# Patient Record
Sex: FEMALE | Race: WHITE | NOT HISPANIC OR LATINO | Employment: PART TIME | URBAN - METROPOLITAN AREA
[De-identification: names, ages, dates, MRNs, and addresses within clinical notes are randomized per-mention and may not be internally consistent; named-entity substitution may affect disease eponyms.]

---

## 2017-01-18 ENCOUNTER — OFFICE VISIT - HEALTHEAST (OUTPATIENT)
Dept: FAMILY MEDICINE | Facility: CLINIC | Age: 57
End: 2017-01-18

## 2017-01-18 DIAGNOSIS — J02.9 SORE THROAT: ICD-10-CM

## 2017-01-18 DIAGNOSIS — J06.9 URI (UPPER RESPIRATORY INFECTION): ICD-10-CM

## 2017-04-29 ENCOUNTER — OFFICE VISIT - HEALTHEAST (OUTPATIENT)
Dept: FAMILY MEDICINE | Facility: CLINIC | Age: 57
End: 2017-04-29

## 2017-04-29 DIAGNOSIS — J32.9 SINUSITIS: ICD-10-CM

## 2017-04-29 RX ORDER — MEDROXYPROGESTERONE ACETATE 2.5 MG/1
TABLET ORAL
Refills: 0 | Status: SHIPPED | COMMUNITY
Start: 2017-04-06

## 2017-10-12 ENCOUNTER — RECORDS - HEALTHEAST (OUTPATIENT)
Dept: ADMINISTRATIVE | Facility: OTHER | Age: 57
End: 2017-10-12

## 2017-12-19 ENCOUNTER — OFFICE VISIT - HEALTHEAST (OUTPATIENT)
Dept: FAMILY MEDICINE | Facility: CLINIC | Age: 57
End: 2017-12-19

## 2017-12-19 ENCOUNTER — COMMUNICATION - HEALTHEAST (OUTPATIENT)
Dept: FAMILY MEDICINE | Facility: CLINIC | Age: 57
End: 2017-12-19

## 2017-12-19 DIAGNOSIS — E55.9 VITAMIN D DEFICIENCY: ICD-10-CM

## 2017-12-19 DIAGNOSIS — F33.9 RECURRENT MAJOR DEPRESSIVE DISORDER, REMISSION STATUS UNSPECIFIED (H): ICD-10-CM

## 2017-12-19 DIAGNOSIS — E78.5 HYPERLIPIDEMIA: ICD-10-CM

## 2017-12-19 DIAGNOSIS — Z12.31 VISIT FOR SCREENING MAMMOGRAM: ICD-10-CM

## 2017-12-19 DIAGNOSIS — Z83.79 FAMILY HISTORY OF CELIAC DISEASE: ICD-10-CM

## 2017-12-19 DIAGNOSIS — R03.0 ELEVATED BLOOD PRESSURE, SITUATIONAL: ICD-10-CM

## 2017-12-19 LAB
ATRIAL RATE - MUSE: 63 BPM
DIASTOLIC BLOOD PRESSURE - MUSE: NORMAL MMHG
INTERPRETATION ECG - MUSE: NORMAL
P AXIS - MUSE: 47 DEGREES
PR INTERVAL - MUSE: 172 MS
QRS DURATION - MUSE: 94 MS
QT - MUSE: 418 MS
QTC - MUSE: 427 MS
R AXIS - MUSE: 32 DEGREES
SYSTOLIC BLOOD PRESSURE - MUSE: NORMAL MMHG
T AXIS - MUSE: 54 DEGREES
VENTRICULAR RATE- MUSE: 63 BPM

## 2017-12-19 ASSESSMENT — MIFFLIN-ST. JEOR: SCORE: 1394.38

## 2017-12-21 LAB
GLIADIN IGA SER-ACNC: 1.3 U/ML
GLIADIN IGG SER-ACNC: <0.4 U/ML
IGA SERPL-MCNC: 299 MG/DL (ref 65–400)
TTG IGA SER-ACNC: 0.4 U/ML
TTG IGG SER-ACNC: <0.6 U/ML

## 2018-02-10 ENCOUNTER — COMMUNICATION - HEALTHEAST (OUTPATIENT)
Dept: FAMILY MEDICINE | Facility: CLINIC | Age: 58
End: 2018-02-10

## 2018-02-10 DIAGNOSIS — E55.9 VITAMIN D DEFICIENCY: ICD-10-CM

## 2019-07-29 ENCOUNTER — OFFICE VISIT - HEALTHEAST (OUTPATIENT)
Dept: FAMILY MEDICINE | Facility: CLINIC | Age: 59
End: 2019-07-29

## 2019-07-29 DIAGNOSIS — L30.9 DERMATITIS: ICD-10-CM

## 2019-10-21 ENCOUNTER — OFFICE VISIT - HEALTHEAST (OUTPATIENT)
Dept: FAMILY MEDICINE | Facility: CLINIC | Age: 59
End: 2019-10-21

## 2019-10-21 ENCOUNTER — RECORDS - HEALTHEAST (OUTPATIENT)
Dept: GENERAL RADIOLOGY | Facility: CLINIC | Age: 59
End: 2019-10-21

## 2019-10-21 DIAGNOSIS — J01.00 ACUTE MAXILLARY SINUSITIS, RECURRENCE NOT SPECIFIED: ICD-10-CM

## 2019-10-21 DIAGNOSIS — R05.9 COUGH: ICD-10-CM

## 2019-10-21 LAB
FLUAV AG SPEC QL IA: NORMAL
FLUBV AG SPEC QL IA: NORMAL

## 2019-10-21 ASSESSMENT — MIFFLIN-ST. JEOR: SCORE: 1409.35

## 2019-11-08 ENCOUNTER — COMMUNICATION - HEALTHEAST (OUTPATIENT)
Dept: FAMILY MEDICINE | Facility: CLINIC | Age: 59
End: 2019-11-08

## 2019-11-08 DIAGNOSIS — R05.9 COUGH: ICD-10-CM

## 2020-02-08 ENCOUNTER — OFFICE VISIT - HEALTHEAST (OUTPATIENT)
Dept: FAMILY MEDICINE | Facility: CLINIC | Age: 60
End: 2020-02-08

## 2020-02-08 DIAGNOSIS — J11.1 INFLUENZA-LIKE ILLNESS: ICD-10-CM

## 2020-02-08 DIAGNOSIS — R68.89 FLU-LIKE SYMPTOMS: ICD-10-CM

## 2020-02-08 LAB
FLUAV AG SPEC QL IA: NORMAL
FLUBV AG SPEC QL IA: NORMAL

## 2020-02-28 ENCOUNTER — OFFICE VISIT - HEALTHEAST (OUTPATIENT)
Dept: FAMILY MEDICINE | Facility: CLINIC | Age: 60
End: 2020-02-28

## 2020-02-28 DIAGNOSIS — J01.00 ACUTE MAXILLARY SINUSITIS, RECURRENCE NOT SPECIFIED: ICD-10-CM

## 2020-02-28 DIAGNOSIS — R51.9 NONINTRACTABLE HEADACHE, UNSPECIFIED CHRONICITY PATTERN, UNSPECIFIED HEADACHE TYPE: ICD-10-CM

## 2020-02-28 ASSESSMENT — MIFFLIN-ST. JEOR: SCORE: 1388.49

## 2020-07-06 ENCOUNTER — COMMUNICATION - HEALTHEAST (OUTPATIENT)
Dept: SCHEDULING | Facility: CLINIC | Age: 60
End: 2020-07-06

## 2020-07-06 DIAGNOSIS — Z11.59 SCREENING FOR VIRAL DISEASE: ICD-10-CM

## 2020-07-08 ENCOUNTER — COMMUNICATION - HEALTHEAST (OUTPATIENT)
Dept: FAMILY MEDICINE | Facility: CLINIC | Age: 60
End: 2020-07-08

## 2020-07-08 ENCOUNTER — AMBULATORY - HEALTHEAST (OUTPATIENT)
Dept: LAB | Facility: CLINIC | Age: 60
End: 2020-07-08

## 2020-07-08 DIAGNOSIS — Z11.59 SCREENING FOR VIRAL DISEASE: ICD-10-CM

## 2020-07-13 ENCOUNTER — COMMUNICATION - HEALTHEAST (OUTPATIENT)
Dept: FAMILY MEDICINE | Facility: CLINIC | Age: 60
End: 2020-07-13

## 2020-08-03 ENCOUNTER — COMMUNICATION - HEALTHEAST (OUTPATIENT)
Dept: FAMILY MEDICINE | Facility: CLINIC | Age: 60
End: 2020-08-03

## 2020-08-03 DIAGNOSIS — K44.9 HIATAL HERNIA: ICD-10-CM

## 2020-08-03 DIAGNOSIS — K21.9 GASTROESOPHAGEAL REFLUX DISEASE, ESOPHAGITIS PRESENCE NOT SPECIFIED: ICD-10-CM

## 2020-08-17 ENCOUNTER — RECORDS - HEALTHEAST (OUTPATIENT)
Dept: ADMINISTRATIVE | Facility: OTHER | Age: 60
End: 2020-08-17

## 2020-09-30 ENCOUNTER — AMBULATORY - HEALTHEAST (OUTPATIENT)
Dept: NURSING | Facility: CLINIC | Age: 60
End: 2020-09-30

## 2020-09-30 ENCOUNTER — AMBULATORY - HEALTHEAST (OUTPATIENT)
Dept: FAMILY MEDICINE | Facility: CLINIC | Age: 60
End: 2020-09-30

## 2020-09-30 DIAGNOSIS — Z23 NEED FOR SHINGLES VACCINE: ICD-10-CM

## 2020-10-01 ENCOUNTER — COMMUNICATION - HEALTHEAST (OUTPATIENT)
Dept: SCHEDULING | Facility: CLINIC | Age: 60
End: 2020-10-01

## 2020-10-07 ENCOUNTER — RECORDS - HEALTHEAST (OUTPATIENT)
Dept: ADMINISTRATIVE | Facility: OTHER | Age: 60
End: 2020-10-07

## 2020-10-15 ENCOUNTER — RECORDS - HEALTHEAST (OUTPATIENT)
Dept: ADMINISTRATIVE | Facility: OTHER | Age: 60
End: 2020-10-15

## 2020-11-02 ENCOUNTER — RECORDS - HEALTHEAST (OUTPATIENT)
Dept: ADMINISTRATIVE | Facility: OTHER | Age: 60
End: 2020-11-02

## 2020-11-07 ENCOUNTER — RECORDS - HEALTHEAST (OUTPATIENT)
Dept: ADMINISTRATIVE | Facility: OTHER | Age: 60
End: 2020-11-07

## 2020-11-15 ENCOUNTER — RECORDS - HEALTHEAST (OUTPATIENT)
Dept: ADMINISTRATIVE | Facility: OTHER | Age: 60
End: 2020-11-15

## 2020-11-17 ENCOUNTER — RECORDS - HEALTHEAST (OUTPATIENT)
Dept: ADMINISTRATIVE | Facility: OTHER | Age: 60
End: 2020-11-17

## 2020-11-23 ENCOUNTER — RECORDS - HEALTHEAST (OUTPATIENT)
Dept: ADMINISTRATIVE | Facility: OTHER | Age: 60
End: 2020-11-23

## 2020-11-23 ENCOUNTER — COMMUNICATION - HEALTHEAST (OUTPATIENT)
Dept: FAMILY MEDICINE | Facility: CLINIC | Age: 60
End: 2020-11-23

## 2020-11-24 ENCOUNTER — OFFICE VISIT - HEALTHEAST (OUTPATIENT)
Dept: FAMILY MEDICINE | Facility: CLINIC | Age: 60
End: 2020-11-24

## 2020-11-24 DIAGNOSIS — Z98.890 PONV (POSTOPERATIVE NAUSEA AND VOMITING): ICD-10-CM

## 2020-11-24 DIAGNOSIS — Z01.818 PREOP GENERAL PHYSICAL EXAM: ICD-10-CM

## 2020-11-24 DIAGNOSIS — R11.2 PONV (POSTOPERATIVE NAUSEA AND VOMITING): ICD-10-CM

## 2020-11-24 LAB
ALBUMIN SERPL-MCNC: 3.7 G/DL (ref 3.5–5)
ALP SERPL-CCNC: 67 U/L (ref 45–120)
ALT SERPL W P-5'-P-CCNC: 21 U/L (ref 0–45)
ANION GAP SERPL CALCULATED.3IONS-SCNC: 10 MMOL/L (ref 5–18)
AST SERPL W P-5'-P-CCNC: 17 U/L (ref 0–40)
BASOPHILS # BLD AUTO: 0.1 THOU/UL (ref 0–0.2)
BASOPHILS NFR BLD AUTO: 1 % (ref 0–2)
BILIRUB SERPL-MCNC: 1.2 MG/DL (ref 0–1)
BUN SERPL-MCNC: 16 MG/DL (ref 8–22)
CALCIUM SERPL-MCNC: 9.4 MG/DL (ref 8.5–10.5)
CHLORIDE BLD-SCNC: 104 MMOL/L (ref 98–107)
CO2 SERPL-SCNC: 25 MMOL/L (ref 22–31)
CREAT SERPL-MCNC: 0.65 MG/DL (ref 0.6–1.1)
EOSINOPHIL # BLD AUTO: 0.2 THOU/UL (ref 0–0.4)
EOSINOPHIL NFR BLD AUTO: 2 % (ref 0–6)
ERYTHROCYTE [DISTWIDTH] IN BLOOD BY AUTOMATED COUNT: 11.9 % (ref 11–14.5)
GFR SERPL CREATININE-BSD FRML MDRD: >60 ML/MIN/1.73M2
GLUCOSE BLD-MCNC: 82 MG/DL (ref 70–125)
HCT VFR BLD AUTO: 47.5 % (ref 35–47)
HGB BLD-MCNC: 15.9 G/DL (ref 12–16)
LYMPHOCYTES # BLD AUTO: 2.1 THOU/UL (ref 0.8–4.4)
LYMPHOCYTES NFR BLD AUTO: 20 % (ref 20–40)
MCH RBC QN AUTO: 30.8 PG (ref 27–34)
MCHC RBC AUTO-ENTMCNC: 33.5 G/DL (ref 32–36)
MCV RBC AUTO: 92 FL (ref 80–100)
MONOCYTES # BLD AUTO: 0.7 THOU/UL (ref 0–0.9)
MONOCYTES NFR BLD AUTO: 6 % (ref 2–10)
NEUTROPHILS # BLD AUTO: 7.4 THOU/UL (ref 2–7.7)
NEUTROPHILS NFR BLD AUTO: 71 % (ref 50–70)
PLATELET # BLD AUTO: 259 THOU/UL (ref 140–440)
PMV BLD AUTO: 8.3 FL (ref 7–10)
POTASSIUM BLD-SCNC: 4.3 MMOL/L (ref 3.5–5)
PROT SERPL-MCNC: 7.2 G/DL (ref 6–8)
RBC # BLD AUTO: 5.15 MILL/UL (ref 3.8–5.4)
SODIUM SERPL-SCNC: 139 MMOL/L (ref 136–145)
WBC: 10.4 THOU/UL (ref 4–11)

## 2020-11-24 ASSESSMENT — MIFFLIN-ST. JEOR: SCORE: 1391.21

## 2020-12-01 ENCOUNTER — RECORDS - HEALTHEAST (OUTPATIENT)
Dept: ADMINISTRATIVE | Facility: OTHER | Age: 60
End: 2020-12-01

## 2020-12-14 ENCOUNTER — RECORDS - HEALTHEAST (OUTPATIENT)
Dept: ADMINISTRATIVE | Facility: OTHER | Age: 60
End: 2020-12-14

## 2020-12-18 ENCOUNTER — OFFICE VISIT - HEALTHEAST (OUTPATIENT)
Dept: FAMILY MEDICINE | Facility: CLINIC | Age: 60
End: 2020-12-18

## 2020-12-18 DIAGNOSIS — T16.2XXA FOREIGN BODY OF LEFT EAR, INITIAL ENCOUNTER: ICD-10-CM

## 2021-01-11 ENCOUNTER — RECORDS - HEALTHEAST (OUTPATIENT)
Dept: ADMINISTRATIVE | Facility: OTHER | Age: 61
End: 2021-01-11

## 2021-01-19 ENCOUNTER — AMBULATORY - HEALTHEAST (OUTPATIENT)
Dept: NURSING | Facility: CLINIC | Age: 61
End: 2021-01-19

## 2021-01-19 DIAGNOSIS — Z23 NEED FOR SHINGLES VACCINE: ICD-10-CM

## 2021-01-21 ENCOUNTER — RECORDS - HEALTHEAST (OUTPATIENT)
Dept: ADMINISTRATIVE | Facility: OTHER | Age: 61
End: 2021-01-21

## 2021-03-01 ENCOUNTER — RECORDS - HEALTHEAST (OUTPATIENT)
Dept: ADMINISTRATIVE | Facility: OTHER | Age: 61
End: 2021-03-01

## 2021-03-11 ENCOUNTER — OFFICE VISIT - HEALTHEAST (OUTPATIENT)
Dept: FAMILY MEDICINE | Facility: CLINIC | Age: 61
End: 2021-03-11

## 2021-03-11 DIAGNOSIS — Z00.00 ANNUAL PHYSICAL EXAM: ICD-10-CM

## 2021-03-11 DIAGNOSIS — R82.90 MALODOROUS URINE: ICD-10-CM

## 2021-03-11 LAB
ALBUMIN SERPL-MCNC: 3.9 G/DL (ref 3.5–5)
ALBUMIN UR-MCNC: NEGATIVE G/DL
ALP SERPL-CCNC: 67 U/L (ref 45–120)
ALT SERPL W P-5'-P-CCNC: 31 U/L (ref 0–45)
ANION GAP SERPL CALCULATED.3IONS-SCNC: 12 MMOL/L (ref 5–18)
APPEARANCE UR: CLEAR
AST SERPL W P-5'-P-CCNC: 24 U/L (ref 0–40)
BILIRUB SERPL-MCNC: 1.1 MG/DL (ref 0–1)
BILIRUB UR QL STRIP: NEGATIVE
BUN SERPL-MCNC: 14 MG/DL (ref 8–22)
CALCIUM SERPL-MCNC: 9.2 MG/DL (ref 8.5–10.5)
CHLORIDE BLD-SCNC: 106 MMOL/L (ref 98–107)
CHOLEST SERPL-MCNC: 236 MG/DL
CO2 SERPL-SCNC: 23 MMOL/L (ref 22–31)
COLOR UR AUTO: YELLOW
CREAT SERPL-MCNC: 0.65 MG/DL (ref 0.6–1.1)
FASTING STATUS PATIENT QL REPORTED: YES
GFR SERPL CREATININE-BSD FRML MDRD: >60 ML/MIN/1.73M2
GLUCOSE BLD-MCNC: 80 MG/DL (ref 70–125)
GLUCOSE UR STRIP-MCNC: NEGATIVE MG/DL
HDLC SERPL-MCNC: 51 MG/DL
HGB UR QL STRIP: NEGATIVE
HIV 1+2 AB+HIV1 P24 AG SERPL QL IA: NEGATIVE
KETONES UR STRIP-MCNC: NEGATIVE MG/DL
LDLC SERPL CALC-MCNC: 168 MG/DL
LEUKOCYTE ESTERASE UR QL STRIP: NEGATIVE
NITRATE UR QL: NEGATIVE
PH UR STRIP: 6.5 [PH] (ref 5–8)
POTASSIUM BLD-SCNC: 4.5 MMOL/L (ref 3.5–5)
PROT SERPL-MCNC: 7.1 G/DL (ref 6–8)
SODIUM SERPL-SCNC: 141 MMOL/L (ref 136–145)
SP GR UR STRIP: 1.02 (ref 1–1.03)
TRIGL SERPL-MCNC: 87 MG/DL
TSH SERPL DL<=0.005 MIU/L-ACNC: 1.93 UIU/ML (ref 0.3–5)
UROBILINOGEN UR STRIP-ACNC: NORMAL

## 2021-03-11 ASSESSMENT — MIFFLIN-ST. JEOR: SCORE: 1387.58

## 2021-03-12 LAB
25(OH)D3 SERPL-MCNC: 33.2 NG/ML (ref 30–80)
25(OH)D3 SERPL-MCNC: 33.2 NG/ML (ref 30–80)

## 2021-05-26 ENCOUNTER — RECORDS - HEALTHEAST (OUTPATIENT)
Dept: ADMINISTRATIVE | Facility: CLINIC | Age: 61
End: 2021-05-26

## 2021-05-27 VITALS
TEMPERATURE: 98.3 F | DIASTOLIC BLOOD PRESSURE: 84 MMHG | OXYGEN SATURATION: 96 % | HEART RATE: 82 BPM | SYSTOLIC BLOOD PRESSURE: 153 MMHG

## 2021-05-28 ENCOUNTER — RECORDS - HEALTHEAST (OUTPATIENT)
Dept: ADMINISTRATIVE | Facility: CLINIC | Age: 61
End: 2021-05-28

## 2021-05-29 ENCOUNTER — RECORDS - HEALTHEAST (OUTPATIENT)
Dept: ADMINISTRATIVE | Facility: CLINIC | Age: 61
End: 2021-05-29

## 2021-05-30 VITALS — WEIGHT: 183.7 LBS | BODY MASS INDEX: 30.57 KG/M2

## 2021-05-30 VITALS — WEIGHT: 179.4 LBS | BODY MASS INDEX: 29.85 KG/M2

## 2021-05-30 NOTE — PROGRESS NOTES
ASSESSMENT & PLAN:  1. Dermatitis  Rash on right wrist does not appear consistent with infectious cause.  Is consistent with dermatitis, unclear trigger.  She is given a prescription for fluocinonide cream to be used on the rash on the right wrist up to twice daily for up to a week.  If not resolving with this, recommend she see dermatology.  If worsening she should let me know right away.  - fluocinonide (LIDEX) 0.05 % cream; Apply to affected skin on wrist once to twice daily for up to two weeks  Dispense: 30 g; Refill: 0      There are no Patient Instructions on file for this visit.    No orders of the defined types were placed in this encounter.    There are no discontinued medications.    Return in about 3 months (around 10/29/2019) for Annual physical.    CHIEF COMPLAINT:  Chief Complaint   Patient presents with     Rash     Pt here today to evaluate rash located RT hand x 1 mo- has tried OTC cream which hasn't helped     Medication Questions     Discuss possible refill of cream       HISTORY OF PRESENT ILLNESS:  Sonia is a 58 y.o. female presenting to the clinic today for rash on right wrist for about the last month.  Cannot think of any new products or inciting event.  She was scratched on that arm by her cat.  She has tried over-the-counter strength hydrocortisone cream without relief.  It itches.  She is trying not to scratch it.  Has otherwise been well, no fevers or chills.    Requests refill of fluocinonide cream, which had been prescribed by a dermatologist for dermatitis around her ear.  Cream is from 2014 and .      REVIEW OF SYSTEMS:   All other systems are negative.    PFSH:  Patient Active Problem List   Diagnosis     Essential Hypercholesterolemia     Vaginal Itching     Anemia     Vitamin D Deficiency     Fatigue     Myalgia     Fibromyalgia        TOBACCO USE:  Social History     Tobacco Use   Smoking Status Never Smoker   Smokeless Tobacco Never Used       VITALS:  Vitals:     07/29/19 1550   BP: 127/59   Patient Site: Left Arm   Patient Position: Sitting   Cuff Size: Adult Large   Pulse: 70   Resp: 16   SpO2: 97%   Weight: 188 lb 12.8 oz (85.6 kg)     Wt Readings from Last 3 Encounters:   07/29/19 188 lb 12.8 oz (85.6 kg)   12/19/17 183 lb 4.8 oz (83.1 kg)   04/29/17 183 lb 11.2 oz (83.3 kg)     Body mass index is 31.91 kg/m .    PHYSICAL EXAM:  GENERAL: Pleasant, well-appearing patient in no acute distress.   HEENT: Pupils equal round. Sclerae and conjunctivae clear. Oropharynx with moist mucous membranes.    EXTREMITIES Warm and well-perfused without edema.  Full range of motion of the right wrist without pain  SKIN: Exam of the right wrist reveals an area with a few scattered erythematous papules.  No vesicles, no drainage, no surrounding erythema, and nontender.  Palpation reveals no palpable abnormality under the skin.  NEURO: Alert and oriented. Grossly nonfocal.   PSYCHIATRIC: Presents on time and well groomed. Normal speech and thought content. Full affect. No abnormal movements or behaviors noted.    MEDICATIONS:  Current Outpatient Medications   Medication Sig Dispense Refill     estradiol (ESTRACE) 1 MG tablet TAKE 1 TABLET BY MOUTH ONCE DAILY.  0     FAMOTIDINE (PEPCID AC ORAL) Take by mouth.       DULoxetine (CYMBALTA) 20 MG capsule 20mg every other day, and increase to daily as tolerated 30 capsule 2     fluocinonide (LIDEX) 0.05 % cream Apply to affected skin on wrist once to twice daily for up to two weeks 30 g 0     fluticasone (FLONASE) 50 mcg/actuation nasal spray 1 spray into each nostril 2 (two) times a day. 16 g 12     medroxyPROGESTERone (PROVERA) 2.5 MG tablet TAKE 1 TABLET BY MOUTH DAILY FOR 90 DAYS  0     polyethylene glycol (MIRALAX) 17 gram packet Take 1 packet (17 g total) by mouth daily. 72 packet 1     No current facility-administered medications for this visit.

## 2021-05-31 VITALS — HEIGHT: 65 IN | BODY MASS INDEX: 30.54 KG/M2 | WEIGHT: 183.3 LBS

## 2021-06-02 NOTE — PATIENT INSTRUCTIONS - HE
Influenza swab negative.    CXR negative, radiology to read.    Doxycycline 100 mg 2 times a day fo 10 days.    Tessalon Perles as needed.    Ventolin inhlaer, 2 puffs up to 4 times day as needed, not before bedtime.

## 2021-06-02 NOTE — PROGRESS NOTES
Assessment:   1. Cough  Influenza A/B Rapid Test    XR Chest 2 Views    albuterol (PROAIR HFA;PROVENTIL HFA;VENTOLIN HFA) 90 mcg/actuation inhaler    benzonatate (TESSALON PERLES) 100 MG capsule    doxycycline (MONODOX) 100 MG capsule   2. Acute maxillary sinusitis, recurrence not specified         Plan:   Influenza swab negative.    CXR negative, radiology to read.    Doxycycline 100 mg 2 times a day fo 10 days.    Tessalon Perles as needed.    Ventolin inhlaer, 2 puffs up to 4 times day as needed, not before bedtime.      Subjective:  Chief Complaint   Patient presents with     URI     Cough, fever, fatigue, aches x 2 weeks       Sonia Carter, a 59 y.o. year old, comes in to clinic with complaints of cough.  She has had 2 weeks of coughing.  She feels like her whole body is aching.  She generally feels ill.  She said it is a dry cough.  Does also feel sinus pressure in her cheeks and the back of her head.  She has had sinus infections before and this does feel similar.  She gets a little short of breath and dizzy with a cough.  She is not a smoker.  She has had a fever up to 100.  She did get a flu shot October 8.  She does teach .  No other concerns.    Current Outpatient Medications   Medication Sig Note     estradiol (ESTRACE) 1 MG tablet TAKE 1 TABLET BY MOUTH ONCE DAILY. 4/29/2017: Received from: External Pharmacy     FAMOTIDINE (PEPCID AC ORAL) Take by mouth.      fluocinonide (LIDEX) 0.05 % cream Apply to affected skin on wrist once to twice daily for up to two weeks      fluticasone (FLONASE) 50 mcg/actuation nasal spray 1 spray into each nostril 2 (two) times a day. 7/29/2019: TAKES PRN     medroxyPROGESTERone (PROVERA) 2.5 MG tablet TAKE 1 TABLET BY MOUTH DAILY FOR 90 DAYS 4/29/2017: Received from: External Pharmacy     albuterol (PROAIR HFA;PROVENTIL HFA;VENTOLIN HFA) 90 mcg/actuation inhaler Inhale 2 puffs every 6 (six) hours as needed for wheezing.      benzonatate (TESSALON PERLES) 100  "MG capsule Take 1 capsule (100 mg total) by mouth every 6 (six) hours as needed for cough.      doxycycline (MONODOX) 100 MG capsule Take 1 capsule (100 mg total) by mouth 2 (two) times a day.        Patient Active Problem List   Diagnosis     Essential Hypercholesterolemia     Vaginal Itching     Anemia     Vitamin D Deficiency     Fatigue     Myalgia     Fibromyalgia       Objective:  /79 (Patient Site: Left Arm, Patient Position: Sitting, Cuff Size: Adult Regular)   Pulse 67   Temp 97.3  F (36.3  C) (Oral)   Resp 16   Ht 5' 4.5\" (1.638 m)   Wt 186 lb 9.6 oz (84.6 kg)   LMP 08/31/2014   BMI 31.54 kg/m    General: No apparent distress  HEENT: Sclera and conjunctiva clear, oropharynx clear, tympanic membranes gray good light reflex  Cardiovascular: Regular rate and rhythm without murmurs, rubs, or gallops  Lungs:  Good air movement, slight wheezes, crackles left lower lobe posteriorly, rhonchi throughout    "

## 2021-06-03 VITALS
HEIGHT: 65 IN | RESPIRATION RATE: 16 BRPM | BODY MASS INDEX: 31.09 KG/M2 | WEIGHT: 186.6 LBS | HEART RATE: 67 BPM | DIASTOLIC BLOOD PRESSURE: 79 MMHG | SYSTOLIC BLOOD PRESSURE: 132 MMHG | TEMPERATURE: 97.3 F

## 2021-06-03 VITALS — BODY MASS INDEX: 31.91 KG/M2 | WEIGHT: 188.8 LBS

## 2021-06-03 NOTE — TELEPHONE ENCOUNTER
Refill Approved    Rx renewed per Medication Renewal Policy. Medication was last renewed on 10/21/19    Mray Lares, [facility] Triage/Med Refill 11/8/2019     Requested Prescriptions   Pending Prescriptions Disp Refills     albuterol (PROAIR HFA;PROVENTIL HFA;VENTOLIN HFA) 90 mcg/actuation inhaler [Pharmacy Med Name: ALBUTEROL HFA (PROAIR) INHALER] 8.5 Inhaler 0     Sig: TAKE 2 PUFFS BY MOUTH EVERY 6 HOURS AS NEEDED FOR WHEEZE       Albuterol/Levalbuterol Refill Protocol Passed - 11/8/2019  3:54 AM        Passed - PCP or prescribing provider visit in last year     Last office visit with prescriber/PCP: 10/21/2019 Raisa Quiroz MD OR same dept: 10/21/2019 Raisa Quiroz MD OR same specialty: 10/21/2019 Raisa Quiroz MD Last physical: Visit date not found       Next appt within 3 mo: Visit date not found  Next physical within 3 mo: Visit date not found  Prescriber OR PCP: Raisa Quiroz MD  Last diagnosis associated with med order: 1. Cough  - albuterol (PROAIR HFA;PROVENTIL HFA;VENTOLIN HFA) 90 mcg/actuation inhaler [Pharmacy Med Name: ALBUTEROL HFA (PROAIR) INHALER]; TAKE 2 PUFFS BY MOUTH EVERY 6 HOURS AS NEEDED FOR WHEEZE  Dispense: 8.5 Inhaler; Refill: 0    If protocol passes may refill for 6 months if within 3 months of last provider visit (or a total of 9 months). If patient requesting >1 inhaler per month refill x 6 months and have patient make appointment with provider.

## 2021-06-04 VITALS
HEART RATE: 71 BPM | DIASTOLIC BLOOD PRESSURE: 65 MMHG | HEIGHT: 65 IN | OXYGEN SATURATION: 97 % | WEIGHT: 182 LBS | BODY MASS INDEX: 30.32 KG/M2 | TEMPERATURE: 97.7 F | SYSTOLIC BLOOD PRESSURE: 129 MMHG

## 2021-06-04 VITALS
OXYGEN SATURATION: 98 % | TEMPERATURE: 100.4 F | HEART RATE: 105 BPM | BODY MASS INDEX: 30.76 KG/M2 | SYSTOLIC BLOOD PRESSURE: 122 MMHG | DIASTOLIC BLOOD PRESSURE: 79 MMHG | WEIGHT: 182 LBS | RESPIRATION RATE: 18 BRPM

## 2021-06-05 VITALS
OXYGEN SATURATION: 100 % | DIASTOLIC BLOOD PRESSURE: 63 MMHG | WEIGHT: 181.8 LBS | BODY MASS INDEX: 30.29 KG/M2 | HEART RATE: 70 BPM | HEIGHT: 65 IN | SYSTOLIC BLOOD PRESSURE: 128 MMHG

## 2021-06-05 VITALS
WEIGHT: 182.6 LBS | DIASTOLIC BLOOD PRESSURE: 76 MMHG | HEART RATE: 74 BPM | OXYGEN SATURATION: 94 % | BODY MASS INDEX: 30.42 KG/M2 | HEIGHT: 65 IN | SYSTOLIC BLOOD PRESSURE: 143 MMHG

## 2021-06-06 NOTE — PROGRESS NOTES
Assessment:     1. Acute maxillary sinusitis, recurrence not specified  doxycycline (VIBRA-TABS) 100 MG tablet   2. Nonintractable headache, unspecified chronicity pattern, unspecified headache type       Detailed discussion about sinusitis and usual cares.  At this time I do not believe antibiotics are needed based on patient's symptoms.  However, these are prescribed and she can use it if his symptoms continue to worsen or not improve.  Guidelines on antibiotic use discussed with the patient.  Advised to use Flonase, nasal saline sprays, Sudafed for congestion.    Headache at the back of her head does not correlate with usual symptoms of sinusitis.  However, patient informs me that every time she has sinus congestion it is her back of the head that hurts.  OTC analgesics and monitor symptoms.    Advised patient about health maintenance, she follows with Dr. Jones GYN for health maintenance.  She informs me she is up-to-date.    Plan:      Nasal saline sprays.  Nasal steroids per medication orders.  Antihistamines per medication orders.        Subjective:      Chief Complaint   Patient presents with     Headache     back of head pain, muccus, eyes are sensative to light, goopy eyes, had influenza 3 weeks ago        Sonia Carter is a 59 y.o. female who presents for evaluation of sinus pain. Symptoms include: congestion, cough, foul rhinorrhea, frequent clearing of the throat, nasal congestion, purulent rhinorrhea, sinus pressure, sniffing and spitting/vomiting mucous. Onset of symptoms was 2 week ago. Symptoms have been gradually improving since that time. Past history is significant for no history of pneumonia or bronchitis. Patient is a non-smoker.    Patient feels it may be her sinus infections.  Although she does not have any maxillary tenderness, she informs me that when she has some sinus issues she gets pain in the back of her head going all the way down to her spine and in general body aches.    She was  diagnosed with flulike symptoms about 3 weeks ago and was treated with Tamiflu although her influenza testing was negative.    Patient follows with Dr. Jones for her routine healthcare management    The following portions of the patient's history were reviewed and updated as appropriate: allergies, current medications, past family history, past medical history, past social history, past surgical history and problem list.  Allergies   Allergen Reactions     Citalopram      Escitalopram        Current Outpatient Medications on File Prior to Visit   Medication Sig Dispense Refill     benzonatate (TESSALON PERLES) 100 MG capsule Take 1 capsule (100 mg total) by mouth every 6 (six) hours as needed for cough. 30 capsule 0     estradiol (ESTRACE) 1 MG tablet TAKE 1 TABLET BY MOUTH ONCE DAILY.  0     FAMOTIDINE (PEPCID AC ORAL) Take by mouth.       fluticasone (FLONASE) 50 mcg/actuation nasal spray 1 spray into each nostril 2 (two) times a day. 16 g 12     medroxyPROGESTERone (PROVERA) 2.5 MG tablet TAKE 1 TABLET BY MOUTH DAILY FOR 90 DAYS  0     [DISCONTINUED] doxycycline (MONODOX) 100 MG capsule Take 1 capsule (100 mg total) by mouth 2 (two) times a day. 20 capsule 0     [DISCONTINUED] fluocinonide (LIDEX) 0.05 % cream Apply to affected skin on wrist once to twice daily for up to two weeks 30 g 0     No current facility-administered medications on file prior to visit.        Patient Active Problem List   Diagnosis     Essential Hypercholesterolemia     Vaginal Itching     Anemia     Vitamin D Deficiency     Fatigue     Myalgia     Fibromyalgia       History reviewed. No pertinent past medical history.    Past Surgical History:   Procedure Laterality Date     HIATAL HERNIA REPAIR       [place]  DELIVERY ONLY      Description:  Section;  Proc Date: 1990;     [place]  DELIVERY ONLY      Description:  Section;  Proc Date: 1997;     [place]  DELIVERY ONLY      Description:  Section;   "Proc Date: 07/06/1999;     [place] LIGATE FALLOPIAN TUBE      Description: Tubal Ligation;  Proc Date: 07/06/1999;       Family History   Problem Relation Age of Onset     Breast cancer Paternal Aunt      Sleep apnea Brother      Snoring Brother      Sleep apnea Brother      Snoring Brother      Celiac disease Daughter 20     Hypertension Father      Stroke Father 77       Social History     Socioeconomic History     Marital status:      Spouse name: None     Number of children: None     Years of education: None     Highest education level: None   Occupational History     None   Social Needs     Financial resource strain: None     Food insecurity:     Worry: None     Inability: None     Transportation needs:     Medical: None     Non-medical: None   Tobacco Use     Smoking status: Never Smoker     Smokeless tobacco: Never Used   Substance and Sexual Activity     Alcohol use: No     Drug use: Never     Sexual activity: Not Currently     Partners: Male   Lifestyle     Physical activity:     Days per week: None     Minutes per session: None     Stress: None   Relationships     Social connections:     Talks on phone: None     Gets together: None     Attends Gnosticist service: None     Active member of club or organization: None     Attends meetings of clubs or organizations: None     Relationship status: None     Intimate partner violence:     Fear of current or ex partner: None     Emotionally abused: None     Physically abused: None     Forced sexual activity: None   Other Topics Concern     None   Social History Narrative     None       Review of Systems  Cardiovascular: negative  Gastrointestinal: negative  Integument/breast: negative  Neurological: negative     Objective:     /65   Pulse 71   Temp 97.7  F (36.5  C) (Oral)   Ht 5' 4.5\" (1.638 m)   Wt 182 lb (82.6 kg)   LMP 08/31/2014   SpO2 97%   BMI 30.76 kg/m        General:Healthy, alert and in no acute distress  Head:  NCAT w/o lesions or " tenderness  Eyes: conjunctivae/corneas clear. PERRL, EOM's intact. Fundi benign  Ears: normal TM's and external ear canals bilateral  Sinus tender: negative  Nose: Enlarged and erythematous turbinates  Mouth: lips, mucosa, and tongue normal. Teeth and gums normal. No tonsillar endangerment or  erythema of pharynx  Neck: supple, symmetrical, trachea midline.  Lungs: clear to auscultation bilaterally  Heart: RRR, No murmurs

## 2021-06-07 ENCOUNTER — RECORDS - HEALTHEAST (OUTPATIENT)
Dept: ADMINISTRATIVE | Facility: OTHER | Age: 61
End: 2021-06-07

## 2021-06-08 NOTE — PROGRESS NOTES
ASSESSMENT:   1. URI (upper respiratory infection)     2. Sore throat  Rapid Strep A Screen-Throat    Group A Strep, RNA Direct Detection, Throat    Influenza A/B Rapid Test        PLAN:  Likely viral.  Symptomatic cares discussed.  F/u with PCP if symptoms persist >5-7 days or worsen in any way.     SUBJECTIVE:   Eboni Carter is a 56 y.o. female presents today with 5 days complaint of sore throat. She also complains of headache, body aches, chills, nasal congestion, cough, fatigue.  Has not checked her temperature at home. Denies vomiting, diarrhea, rash, shortness of breath. Sick contacts: strep going around the  she works at. Has tried tylenol and Nyquil without relief. She did receive a flu shot this year.     Patient Active Problem List   Diagnosis     Essential Hypercholesterolemia     Vaginal Itching     Anemia     Vitamin D Deficiency     Fatigue     Myalgia     Fibromyalgia       History   Smoking Status     Never Smoker   Smokeless Tobacco     Not on file       Current Medications:  Current Outpatient Prescriptions on File Prior to Visit   Medication Sig Dispense Refill     cholecalciferol, vitamin D3, 5,000 unit Tab Take 1 tablet by mouth once a week. For 3 months       DULoxetine (CYMBALTA) 20 MG capsule Take 1 capsule (20 mg total) by mouth daily. 30 capsule 2     estrogen, conjugated,-medroxyPROGESTERone (PREMPRO) 0.3-1.5 mg per tablet Take 1 tablet by mouth daily.       FAMOTIDINE (PEPCID AC ORAL) Take by mouth.       fluticasone (FLONASE) 50 mcg/actuation nasal spray 1 spray into each nostril 2 (two) times a day. 16 g 12     lansoprazole (PREVACID) 30 MG capsule Take 30 mg by mouth daily.       PREMPRO 0.45-1.5 mg per tablet TAKE ONE TABLET BY MOUTH ONCE DAILY  0     No current facility-administered medications on file prior to visit.        Allergies:   Allergies   Allergen Reactions     Citalopram      Escitalopram        OBJECTIVE:   Vitals:    01/18/17 1206   BP: 130/80   Pulse: 74    Resp: 16   Temp: 98.5  F (36.9  C)   TempSrc: Oral   SpO2: 98%   Weight: 179 lb 6.4 oz (81.4 kg)     Physical exam reveals a pleasant 56 y.o. female.   Appears healthy, alert, cooperative and in NAD.  Eyes:  No conjunctivitis, lids normal.   Ears:  normal TMs bilaterally  Nose:    Mucosa normal. Scant clear rhinorrhea  Mouth:  Mucosa pink and moist.  Mild erythema of posterior pharynx. No exudate or palatal petechiae. Uvula is midline.    Lymph: no cervical LAD  Lungs: Chest is clear, no wheezing, rhonchi or rales. Symmetric air entry throughout both lung fields.  Heart: regular rate and rhythm, no murmur, rub or gallop.       Recent Results (from the past 24 hour(s))   Rapid Strep A Screen-Throat   Result Value Ref Range    Rapid Strep A Antigen No Group A Strep detected No Group A Strep detected   Influenza A/B Rapid Test   Result Value Ref Range    Influenza  A, Rapid Antigen No Influenza A antigen detected No Influenza A antigen detected    Influenza B, Rapid Antigen No Influenza B antigen detected No Influenza B antigen detected

## 2021-06-09 NOTE — TELEPHONE ENCOUNTER
"Patient is calling requesting COVID serologic antibody testing.  NOTE: Serologic testing is a blood test for 'antibodies' which are made at 10-14 days after you have had symptoms of COVID or were exposed and had an asymptomatic infection.  This does NOT test you for 'active' infection or tell you if you are contagious.    Are you a healthcare worker?  No  Do you currently have a cough, fever, body aches, shortness of breath or difficulty breathing?   No  Did you previously have cough, fever, body aches, shortness of breath, or difficulty breathing that have now resolved? Has had previous covid symptoms.   Symptoms began February   Symptoms started > 14 days ago. Lab order placed per SARS-CoV-2 Serology test Standing Order using indication \"Previously symptomatic >14d since onset, currently asymptomatic\" and diagnosis code \"Screening for viral disease\" (Z11.59)          The patient was informed: \"Testing is limited each day and it may take time for testing to be available to everyone who has called. You will receive a call within 48-72 hours to schedule the serology testing. Please confirm the best number to reach you is [phone]. If you have any questions about scheduling, call [phone].\"     "

## 2021-06-09 NOTE — TELEPHONE ENCOUNTER
"Spoke to pt to get more information. Asked what her insurance company was needing and she stated they just said it needed to be \" approved\". Informed pt that  ordered the test so it was approved to be done. Asked her to clarify with her insurance as to what exactly they need if anything. Can do a letter if necessary. She will let us know if she needs anything further  "

## 2021-06-09 NOTE — TELEPHONE ENCOUNTER
Who is calling:  Patient  Reason for Call:  Pt calling because she is scheduled to have serology testing done later today.  Pt contacted insurance who has stated the provider needs to state that this has to be done to be covered.  Pt is requesting the test because she works in a  was really sick in February and wants peace of mind prior to going back to work in September.  Please advise.  Date of last appointment with primary care: N/A  Okay to leave a detailed message: No

## 2021-06-10 NOTE — TELEPHONE ENCOUNTER
Patient Returning Call  Reason for call:  Return call  Information relayed to patient:  Caller was informed of message below.   Patient has additional questions:  Yes  If YES, what are your questions/concerns:  Caller stated that she has a lot of acid reflux's at night and Hiatal hernia and also was told by her gastroenterology that she needed to speak to Stephen Sanchez MD for getting those checked. Caller stated that she normally gets them done every 10 years or somewhere there.   Okay to leave a detailed message?: Yes

## 2021-06-10 NOTE — TELEPHONE ENCOUNTER
Referral Request  Type of referral: Gastroenterology  Who s requesting: Patient  Why the request:   Endoscopy  Hiatal Hernia  Have you been seen for this request: No:  Appointment Offered:  declined  Does patient have a preference on a group/provider?   [facility]  [facility]  Okay to leave a detailed message?  Yes

## 2021-06-10 NOTE — PROGRESS NOTES
ASSESSMENT:   1. Sinusitis  methylPREDNISolone (MEDROL DOSEPACK) 4 mg tablet    doxycycline (VIBRA-TABS) 100 MG tablet     Recurrent sinusitis that responded well to doxy and medrol dose pack in dec. She requests the same medications.     PLAN:  Sinusitis  Doxycycline prescribed. Recommend probiotics such as acidophillis and bifidus to replace the normal bacteria that lives in the colon and gets depleted by antibiotics. You can buy it as an over the counter supplement (Culturelle, Florajen) or eat yogurt with live or active cultures.  Refilled medrol dose pack  Continue flonase daily  Push fluids, get extra rest  Recommend hot tea with lemon/honey, lozenges  Recommend hot steamy showers, saline nasal spray or a netti pot to relieve congestion  May use a cough suppressant or a cool mist humidifier to lessen cough  Return to clinic if symptoms are not improving as expected or if worsening in any way.       SUBJECTIVE:   Eboni Carter is a 56 y.o. female presents today with cold symptoms for 2 weeks and is worsening. She has had nasal congestion, HA, right sided facial pain, sore throat, PND, ear pressure, productive cough, fatigue and myalgia but denies fever and chills. Sick contacts: works in . Has tried flonase with minimal relief.     No past medical history on file.    History   Smoking Status     Never Smoker   Smokeless Tobacco     Not on file       Current Medications:  Current Outpatient Prescriptions   Medication Sig Dispense Refill     FAMOTIDINE (PEPCID AC ORAL) Take by mouth.       fluticasone (FLONASE) 50 mcg/actuation nasal spray 1 spray into each nostril 2 (two) times a day. 16 g 12     doxycycline (VIBRA-TABS) 100 MG tablet Take 1 tablet (100 mg total) by mouth 2 (two) times a day for 10 days. 20 tablet 0     estradiol (ESTRACE) 1 MG tablet TAKE 1 TABLET BY MOUTH ONCE DAILY.  0     medroxyPROGESTERone (PROVERA) 2.5 MG tablet TAKE 1 TABLET BY MOUTH DAILY FOR 90 DAYS  0      methylPREDNISolone (MEDROL DOSEPACK) 4 mg tablet Take 1 tablet (4 mg total) by mouth daily. follow package directions 21 tablet 0     No current facility-administered medications for this visit.        Allergies:   Allergies   Allergen Reactions     Citalopram      Escitalopram        OBJECTIVE:   Vitals:    04/29/17 0921   BP: 140/70   Pulse: 85   Resp: 16   Temp: 98.7  F (37.1  C)   TempSrc: Oral   SpO2: 97%   Weight: 183 lb 11.2 oz (83.3 kg)     Physical exam reveals a pleasant 56 y.o. female.   Appears alert and cooperative.  Eyes:  JOSELINE, EOMI  Ears:  normal TMs bilaterally and normal canals bilaterally  Nose:    Mucosa normal. Scant, clear rhinorrhea.clear rhinorrhea and mucosal erythema  Mouth:  Mucosa pink and moist.  mild erythema   Neck: normal, supple and no adenopathy  Sinuses: nontender with palpation  Lungs: Chest is clear, no wheezing or rales. Symmetric air entry throughout both lung fields.  Heart: regular rate and rhythm, no murmur, rub or gallop

## 2021-06-10 NOTE — TELEPHONE ENCOUNTER
Please ask patient when she was diagnosed with hiatal hernia.  I am not able to find any diagnoses in her chart.  I also reviewed her x-ray done recently and there is no mention of hiatal hernia.  Does she have gastritis?.  I will need to document the need for endoscopy otherwise insurance may not cover it.  At this point, more information is needed.Stephen Sanchez MD  8/4/2020

## 2021-06-10 NOTE — TELEPHONE ENCOUNTER
Endoscopy ordered with a diagnosis of acid reflux and hiatal hernia.    Stephen Sanchez MD  8/6/2020

## 2021-06-10 NOTE — TELEPHONE ENCOUNTER
Left message for patient with providers message/questions below. Asked patient to return our call or send a mychart message with the answers to the question the provider is asking below.

## 2021-06-10 NOTE — TELEPHONE ENCOUNTER
Please see [facility] note in media from 7/25/2016. It was also mentioned in many other provider notes.

## 2021-06-10 NOTE — TELEPHONE ENCOUNTER
Left message to call back for: Message below  Information to relay to patient:  MD's message below

## 2021-06-10 NOTE — TELEPHONE ENCOUNTER
I can order endoscopy.      However, I have reviewed GI note from 2016 that mentions that there are no red flag symptoms to suggest urgent need for endoscopy nor other specific recommendations regarding surveillance of Dasilva's if figures without a family history.    I would like to get more details as to why an endoscopy is needed and what should I write further orders.    Would she prefer to get another consult with gastroenterology.      Stephen Sanchez MD  8/6/2020

## 2021-06-11 NOTE — TELEPHONE ENCOUNTER
Pt had a shingles vaccine yesterday, and is calling in about a possible reaction. Pt reports some swelling and redness at the site, and tenderness, and a fever of 99.1. Pt also had an headache, and upset stomach yesterday.   Care advice given, discussed normal reactions to these immunizations.   Per protocol pt should be able to treat this at home. Pt agrees with plan, and was advised to call back is symptoms continue after 3 days. Pt verbalized understanding.     Yonatan Huntley RN [facility] Triage/Medication Refill      Reason for Disposition    Shingles (Herpes zoster; Shingrix) vaccine reactions    Additional Information    Negative: [1] Difficulty with breathing or swallowing AND [2] starts within 2 hours after injection    Negative: Difficult to awaken or acting confused (e.g., disoriented, slurred speech)    Negative: Unresponsive, passed out, or very weak    Negative: Sounds like a life-threatening emergency to the triager    Negative: Fever > 104 F (40 C)    Negative: [1] Fever > 101 F (38.3 C) AND [2] age > 60    Negative: [1] Fever > 100.0 F (37.8 C) AND [2] bedridden (e.g., nursing home patient, CVA, chronic illness, recovering from surgery)    Negative: [1] Fever > 100.0 F (37.8 C) AND [2] diabetes mellitus or weak immune system (e.g., HIV positive, cancer chemo, splenectomy, organ transplant, chronic steroids)    Negative: [1] Measles vaccine rash (onset day 6-12) AND [2] purple or blood-colored    Negative: Sounds like a severe, unusual reaction to the triager    Negative: [1] Redness or red streak around the injection site AND [2] begins > 48 hours after shot AND [3] fever    Negative: [1] Redness or red streak around the injection site AND [2] begins > 48 hours after shot AND [3] no fever  (Exception: red area < 1 inch or 2.5 cm wide)    Negative: Fever present > 3 days (72 hours)    Negative: [1] Over 3 days (72 hours) since shot AND [2] redness, swelling or pain getting worse    Negative: [1]  Smallpox vaccine and [2] eye pain, eye redness, or rash on eyelids    Negative: [1] Pain, tenderness, or swelling at the injection site AND [2] persists > 3 days    Negative: [1] Measles vaccine rash (onset day 6-12) AND [2] persists > 3 days    Negative: [1] Deep lump follows (in 2 to 8 weeks) Td or TDaP  shot AND [2] becomes tender to the touch    Negative: Immunization needed, questions about    Negative: Injection site reaction to any vaccine    Negative: [1] Vaccines for travel, questions about AND [2] no current symptoms    Negative: Anthrax vaccine reactions    Negative: Chickenpox (varicella) vaccine reactions    Negative: Hepatitis A (HAV) vaccine reactions    Negative: Hepatitis B (HBV) vaccine reactions    Negative: Human Papilloma Virus (HPV) vaccine reactions    Negative: H1N1 Influenza (inactivated) injected vaccine reactions    Negative: H1N1 Influenza (LAIV) intranasal vaccine reactions    Negative: Influenza (TIV; Injection) injected vaccine reactions    Negative: Influenza (LAIV; Intranasal) intranasal vaccine reactions    Negative: Japanese encephalitis vaccine reactions    Negative: Measles, Mumps, Rubella (MMR) vaccine reactions    Negative: Meningococcal vaccine reactions    Negative: Pneumococcal vaccine reactions    Negative: Polio (IPV) vaccine reactions    Negative: Rabies vaccine reactions    Protocols used: IMMUNIZATION [id number]

## 2021-06-13 NOTE — TELEPHONE ENCOUNTER
New Appointment Needed  What is the reason for the visit:    Pre-Op Appt Request  When is the surgery? :  12/1/20  Where is the surgery?:   [facility]  Who is the surgeon? :  Dr. Delvin Marcano  What type of surgery is being done?: right knee surgery  Provider Preference: Any available  How soon do you need to be seen?: As soon as possible  Waitlist offered?: No  Okay to leave a detailed message:  Yes

## 2021-06-13 NOTE — PROGRESS NOTES
[facility]  [address] [facility] [address]  Dept: [phone]  Dept Fax: [phone]  Primary Provider: Jovanni Sanchez MD  Pre-op Performing Provider: JOVANNI SANCHEZ    PREOPERATIVE EVALUATION:  Today's date: 11/24/2020    Sonia Crater is a 60 y.o. female who presents for a preoperative evaluation.    Surgical Information:  Surgery/Procedure: Right knee  Surgery Location: [facility]   Surgeon: Dr. Marcano  Surgery Date: 12/01/2020  Time of Surgery: 9AM  Where patient plans to recover: At home with family  Fax number for surgical facility: [phone]    Type of Anesthesia Anticipated: Choice    Subjective     HPI related to upcoming procedure:  Going for Meniscal tear repair.  Patient is on hormonal replacement therapy.  She does appear to be for increased risk for DVT due to being likely in a cast.  This is discussed with her.  Needs Covid testing.  However, this is early as results may not be valid due to surgery being next week    Preop Questions 11/24/2020   Have you ever had a heart attack or stroke? No   Have you ever had surgery on your heart or blood vessels, such as a stent placement, a coronary artery bypass, or surgery on an artery in your head, neck, heart, or legs? No   Do you have chest pain with activity? No   Do you have a history of  heart failure? No   Do you currently have a cold, bronchitis or symptoms of other infection? No   Do you have a cough, shortness of breath, or wheezing? No   Do you or anyone in your family have previous history of blood clots? No   Do you or does anyone in your family have a serious bleeding problem such as prolonged bleeding following surgeries or cuts? No   Have you ever had problems with anemia or been told to take iron pills? No   Have you had any abnormal blood loss such as black, tarry or bloody stools, or abnormal vaginal bleeding? No   Have you ever had a blood transfusion? No   Are you  willing to have a blood transfusion if it is medically needed before, during, or after your surgery? Yes   Have you or any of your relatives ever had problems with anesthesia? YES - PONV   Do you have sleep apnea, excessive snoring or daytime drowsiness? No   Do you have any artifical heart valves or other implanted medical devices like a pacemaker, defibrillator, or continuous glucose monitor? No   Do you have artificial joints? No   Are you allergic to latex? No   Is there any chance that you may be pregnant? No     Health Care Directive:  Patient does not have a Health Care Directive or Living Will: Discussed advance care planning with patient; information given to patient to review.    Preoperative Review of :    reviewed - no record of controlled substances prescribed.    See problem list for active medical problems.  Problems all longstanding and stable, except as noted/documented.  See ROS for pertinent symptoms related to these conditions.      Review of Systems  CONSTITUTIONAL: NEGATIVE for fever, chills, change in weight  INTEGUMENTARY/SKIN: NEGATIVE for worrisome rashes, moles or lesions  EYES: NEGATIVE for vision changes or irritation  ENT/MOUTH: NEGATIVE for ear, mouth and throat problems  RESP: NEGATIVE for significant cough or SOB  BREAST: NEGATIVE for masses, tenderness or discharge  CV: NEGATIVE for chest pain, palpitations or peripheral edema  GI: NEGATIVE for nausea, abdominal pain, heartburn, or change in bowel habits  : NEGATIVE for frequency, dysuria, or hematuria  NEURO: NEGATIVE for weakness, dizziness or paresthesias  ENDOCRINE: NEGATIVE for temperature intolerance, skin/hair changes  HEME: NEGATIVE for bleeding problems  PSYCHIATRIC: NEGATIVE for changes in mood or affect    Patient Active Problem List    Diagnosis Date Noted     Myalgia 10/11/2016     Fibromyalgia 10/11/2016     Essential Hypercholesterolemia      Vaginal Itching      Anemia      Vitamin D Deficiency      Fatigue   "    History reviewed. No pertinent past medical history.  Past Surgical History:   Procedure Laterality Date     HIATAL HERNIA REPAIR       [facility]  DELIV[facility]      Description:  Section;  Proc Date: 1990;     [facility]  DEL[facility]      Description:  Section;  Proc Date: 1997;     [facility]  DELIV[facility]      Description:  Section;  Proc Date: 1999;     [facility] LIGATE FALLOPIAN TUBE      Description: Tubal Ligation;  Proc Date: 1999;     Current Outpatient Medications   Medication Sig Dispense Refill     estrogen,alis/me-testosterone (ESTROGENS-METHYLTESTOSTERONE ORAL) Take by mouth daily.       medroxyPROGESTERone (PROVERA) 2.5 MG tablet TAKE 1 TABLET BY MOUTH DAILY FOR 90 DAYS  0     No current facility-administered medications for this visit.        Allergies   Allergen Reactions     Citalopram      Escitalopram        Social History     Tobacco Use     Smoking status: Never Smoker     Smokeless tobacco: Never Used   Substance Use Topics     Alcohol use: No      Family History   Problem Relation Age of Onset     Breast cancer Paternal Aunt      Sleep apnea Brother      Snoring Brother      Sleep apnea Brother      Snoring Brother      Celiac disease Daughter 20     Hypertension Father      Stroke Father 77     Social History     Substance and Sexual Activity   Drug Use Never        Objective     /76 (Patient Site: Left Arm, Patient Position: Sitting, Cuff Size: Adult Large)   Pulse 74   Ht 5' 4.5\" (1.638 m)   Wt 182 lb 9.6 oz (82.8 kg)   LMP 2014   SpO2 94%   BMI 30.86 kg/m    Physical Exam    GENERAL APPEARANCE: healthy, alert and no distress     EYES: EOMI, PERRL     NECK: no adenopathy, no asymmetry, masses, or scars and thyroid normal to palpation     RESP: lungs clear to auscultation - no rales, rhonchi or wheezes     CV: regular rates and rhythm, normal S1 S2, no S3 or S4 and no murmur, click or rub     ABDOMEN:  soft, nontender, no " HSM or masses and bowel sounds normal     MS: extremities normal- no gross deformities noted, no evidence of inflammation in joints, FROM in all extremities.     SKIN: no suspicious lesions or rashes     NEURO: Normal strength and tone, sensory exam grossly normal, mentation intact and speech normal     PSYCH: mentation appears normal. and affect normal/bright     LYMPHATICS: No cervical adenopathy    Recent Labs   Lab Test 11/24/20  1219   HGB 15.9           PRE-OP Diagnostics:   Labs pending at this time. Results will be reviewed when available.  No EKG required, no history of coronary heart disease, significant arrhythmia, peripheral arterial disease or other structural heart disease.    REVISED CARDIAC RISK INDEX (RCRI)   The patient has the following serious cardiovascular risks for perioperative complications:   - No serious cardiac risks = 0 points    RCRI INTERPRETATION: 0 points: Class I (very low risk - 0.4% complication rate)         Assessment & Plan      The proposed surgical procedure is considered LOW risk.    Problem List Items Addressed This Visit     None      Visit Diagnoses     Preop general physical exam    -  Primary    Relevant Orders    HM1(CBC and Differential) (Completed)    Comprehensive Metabolic Panel    PONV (postoperative nausea and vomiting)               Risks and Recommendations:  The patient has the following additional risks and recommendations for perioperative complications:   - No identified additional risk factors other than previously addressed    Medication Instructions:  Patient is to take all scheduled medications on the day of surgery EXCEPT for modifications listed below:  Patient will hold HRT therapy 2 weeks before the surgery.  Discussed DVT risk.    RECOMMENDATION:  APPROVAL GIVEN to proceed with proposed procedure, without further diagnostic evaluation.    Signed Electronically by: Stephen Sanchez MD    Copy of this evaluation report is provided to  requesting physician.    Preop [facility] Preop Guidelines    Revised Cardiac Risk Index

## 2021-06-13 NOTE — PROGRESS NOTES
Chief Complaint   Patient presents with     Foreign Body in Ear     Lt ear cotton swab       HPI:  Sonia Carter is a 60 y.o. female who presents today complaining of retained tip of a cotton swab in the ear. Patient was cleaning her ear with a Q-tip when a piece fell off in the left ear. She denies any pain.      History obtained from the patient.    Problem List:  2016-10: Myalgia  2016-10: Fibromyalgia  Essential Hypercholesterolemia  Vaginal Itching  Anemia  Drip Or Drainage Down Throat From Above  Eustachian Tube Dysfunction  Vitamin D Deficiency  Fatigue  Sinusitis  Excessive Thirst      No past medical history on file.    Social History     Tobacco Use     Smoking status: Never Smoker     Smokeless tobacco: Never Used   Substance Use Topics     Alcohol use: No       Review of Systems   HENT:        (+) retained FB in the ear canal   All other systems reviewed and are negative.      Vitals:    12/18/20 1053   BP: 153/84   Patient Site: Right Arm   Patient Position: Sitting   Cuff Size: Adult Regular   Pulse: 82   Temp: 98.3  F (36.8  C)   TempSrc: Oral   SpO2: 96%       Physical Exam  Vitals signs and nursing note reviewed.   Constitutional:       General: She is not in acute distress.     Appearance: She is well-developed. She is not diaphoretic.   HENT:      Head: Normocephalic and atraumatic.      Right Ear: Tympanic membrane, ear canal and external ear normal.      Left Ear: External ear normal.      Ears:      Comments: Tip of a cotton swab present in the left ear. It was removed using Alligator forceps. Left TM and canal appeared normal after the removal of the FB.   Eyes:      General:         Right eye: No discharge.         Left eye: No discharge.      Conjunctiva/sclera: Conjunctivae normal.   Pulmonary:      Effort: Pulmonary effort is normal. No respiratory distress.   Neurological:      Mental Status: She is alert.   Psychiatric:         Behavior: Behavior normal.         Thought Content:  Thought content normal.         Judgment: Judgment normal.           Clinical Decision Making:  FB was removed without complication. We discussed avoidance of using Q-tips. Patient states she will no longer be using them.   At the end of the encounter, I discussed results, diagnosis, medications. Discussed red flags for immediate return to clinic/ER, as well as indications for follow up if no improvement. Patient understood and agreed to plan. Patient was stable for discharge.    1. Foreign body of left ear, initial encounter           Patient Instructions   1. Avoid cleaning your ear with cotton swabs. You should avoid putting anything in your ear since it can potentially cause injury to the canal or ear drum.   2. In the future if you are trying to remove wax I recommend using wax removing solutions such as Debrox ear wash.   3. Follow up if you have any new concerns.

## 2021-06-13 NOTE — PATIENT INSTRUCTIONS - HE
1. Avoid cleaning your ear with cotton swabs. You should avoid putting anything in your ear since it can potentially cause injury to the canal or ear drum.   2. In the future if you are trying to remove wax I recommend using wax removing solutions such as Debrox ear wash.   3. Follow up if you have any new concerns.

## 2021-06-14 NOTE — PROGRESS NOTES
ASSESSMENT:  1. Hyperlipidemia  Discussed lab results from OB/gyn clinic. Medications not indicated at this time, but a diagnosis of HTN might change that.   - Comprehensive Metabolic Panel    2. Family history of celiac disease  - Celiac(Gluten)Antibody Panel ($$$)    3. Elevated blood pressure, situational  Patient advised to call me to report what her BP was at Gyn appointment. BP in pre-HTN range today. Baseline EKG obtained and normal. Baseline renal function and electrolytes today . Recommend stress reduction, exercise, weight loss, healthy diet and limiting salt. Get a home BP cuff and monitor regularly, and let me know if >140/90  - Comprehensive Metabolic Panel  - Urinalysis-UC if Indicated  - Electrocardiogram Perform and Read    4. Vit D deficiency  Discussed results from gyn clinic and recommend vit D 50,000 IU once weekly for 8 weeks and recheck.    5. Depression  Interested in restarting duloxetine. If causes constipation, increase fluids, fiber, exercise, and add miralax if needed. Start QOD and increase as tolerated.       PLAN:  There are no Patient Instructions on file for this visit.    Orders Placed This Encounter   Procedures     Celiac(Gluten)Antibody Panel ($$$)     Comprehensive Metabolic Panel     Urinalysis-UC if Indicated     Electrocardiogram Perform and Read     Medications Discontinued During This Encounter   Medication Reason     methylPREDNISolone (MEDROL DOSEPACK) 4 mg tablet Therapy completed       No Follow-up on file.    CHIEF COMPLAINT:  Chief Complaint   Patient presents with     Hypertension     states she had high bp at her OB appt in 11/2017; discuss medications     Hyperlipidemia     discuss levels from OB in 11/2017     Vitamin D Deficiency     discuss levels     Depression     discuss possible meds       HISTORY OF PRESENT ILLNESS:  Eboni Jimenez is a 57 y.o. female presenting to the clinic today for hypertension. She states at her last OB appointment, her blood pressure ran  "\"high'\". But does not recall the number. She has it written at home.  She would like to discuss potential medications to control her blood pressure. She does not know if she has a cuff at home to measure her blood pressure.    Vitamin D Deficiency: Her labs from 17 showed low vitamin D levels. She has questions regarding what can be done about this. She has been on a prescription strength Vitamin D supplement before.    Family History of Celiac's: She notes that her daughter was recently diagnosed with Celiac's. She has had a endoscopy in the past but has not had biopsies taken. She eats gluten regularly. No obvious symptoms.     Hyperlipidemia: Her labs also showed high levels of LDL; 149.      Depression: She thinks her mood may be related to the hormones that she's taking. She has tried duloxetine in the past but it caused her constipation. She has had issues with dealing with the side effects of medications in the past. Review of archive medications shows previous prescriptions for citalopram and fluoxetine, she does not recall specific side effects, but suspects she did not tolerate.     HRT: She follows gynecology at [facility] for HRT. She continues on 1 mg estradiol.    REVIEW OF SYSTEMS:   She has fibromyalgia. No obvious symptoms of celiac, but interested in screening since her daughter was diagnosed. All other systems are negative.    PFSH:  Social: She works in a [employer] school.  Family: No family history of thyroid problems. Her daughter, Jaclyn, was recently diagnosed with Celiac's Disease. Her father had hypertension and  from a stroke at age 77.    TOBACCO USE:  History   Smoking Status     Never Smoker   Smokeless Tobacco     Not on file       VITALS:  Vitals:    17 1029   BP: 137/82   Patient Site: Left Arm   Patient Position: Sitting   Cuff Size: Adult Large   Pulse: 64   Resp: 16   Temp: 98.4  F (36.9  C)   TempSrc: Oral   Weight: 183 lb 4.8 oz (83.1 kg)   Height: 5' 4.5\" (1.638 " m)     Wt Readings from Last 3 Encounters:   12/19/17 183 lb 4.8 oz (83.1 kg)   04/29/17 183 lb 11.2 oz (83.3 kg)   01/18/17 179 lb 6.4 oz (81.4 kg)     Body mass index is 30.98 kg/(m^2).    PHYSICAL EXAM:  GENERAL: Pleasant, well-appearing patient in no acute distress.   HEENT: Pupils equal round reactive to light. Sclerae and conjunctivae clear. Oropharynx is clear with moist mucous membranes.   NECK: Supple without lymphadenopathy, no carotid bruits   CARDIOVASCULAR: Heart regular rate and rhythm without murmur normal S1-S2   ABDOMEN: Soft, nontender, nondistended.  No guarding or rebound.  No organomegaly or masses appreciated.  LUNGS: Clear to auscultation bilaterally, good air movement throughout   EXTREMITIES Warm and well-perfused without edema. Pedal pulses palpable and symmetric bilaterally   NEURO: Alert and oriented. Grossly nonfocal.   PSYCHIATRIC: Presents on time and well groomed. Normal speech and thought content. Full affect. No abnormal movements or behaviors noted. No SI.      QUALITY MEASURES:  The following are part of a depression follow up plan for the patient:  mental health care management    ADDITIONAL HISTORY SUMMARIZED (2): rheum Sajjad 10/11/16.  DECISION TO OBTAIN EXTRA INFORMATION (1): None.   RADIOLOGY TESTS (1): None.  LABS (1): None.  MEDICINE TESTS (1): EKG ordered today.  INDEPENDENT REVIEW (2 each): Independent review of EKG, normal sinus rhythm    The visit lasted a total of 20 minutes face to face with the patient. Over 50% of the time was spent counseling and educating the patient about depression.    ICiara, am scribing for and in the presence of, Dr. Velazquez.    I, Dr. Velazquez, personally performed the services described in this documentation, as scribed by Ciara Shabazz in my presence, and it is both accurate and complete.    MEDICATIONS:  Current Outpatient Prescriptions   Medication Sig Dispense Refill     estradiol (ESTRACE) 1 MG tablet TAKE 1 TABLET BY MOUTH ONCE DAILY.   0     FAMOTIDINE (PEPCID AC ORAL) Take by mouth.       fluticasone (FLONASE) 50 mcg/actuation nasal spray 1 spray into each nostril 2 (two) times a day. 16 g 12     medroxyPROGESTERone (PROVERA) 2.5 MG tablet TAKE 1 TABLET BY MOUTH DAILY FOR 90 DAYS  0     No current facility-administered medications for this visit.        Total data points: 5

## 2021-06-18 NOTE — PATIENT INSTRUCTIONS - HE
"Patient Instructions by Stephen Sanchez MD at 3/11/2021  9:10 AM     Author: Stephen Sanchez MD Service: -- Author Type: Physician    Filed: 3/11/2021  9:46 AM Encounter Date: 3/11/2021 Status: Signed    : Stephen Sanchez MD (Physician)       Patient Education     Understanding Body Mass Index (BMI)  Body mass index (BMI) is a method of screening for a weight category using the ratio of your height to your weight. The BMI is a measure of overweight that is corrected for height. Knowing your BMI is a way to tell if you are at a healthy weight. The higher your BMI, the greater your risk for weight-related health problems.  What BMI means    BMI below 18.5: Underweight    BMI 18.5 to 24.9: Healthy weight or \"ideal body weight\"     BMI 25 to 29.9: Overweight    BMI 30 and over: Obese    BMI 40 and over: Severe obesity   Online BMI Calculators  Find your BMI with an online BMI calculator tool, such as these from the CDC:    BMI calculator for adults    BMI calculator for children and teens   Using the BMI chart  To figure out your BMI, find your height and weight (or the numbers closest to them) on the table below. Follow each column of numbers to where your height and weight meet on the table. That is your BMI.    Date Last Reviewed: 7/1/2016 2000-2019 [employer]. [address] [place], [place] [id number]. All rights reserved. This information is not intended as a substitute for professional medical care. Always follow your healthcare professional's instructions.           Patient Education     Prevention Guidelines, Women Ages 50 to 64  Screening tests and vaccines are an important part of managing your health. A screening test is done to find possible disorders or diseases in people who don't have any symptoms. The goal is to find a disease early so lifestyle changes can be made and you can be watched more closely to reduce the risk of disease, or to detect it early enough to treat it " most effectively. Screening tests are not considered diagnostic, but are used to determine if more testing is needed. Health counseling is essential, too. Below are guidelines for these, for women ages 50 to 64. Talk with your healthcare provider to make sure youre up to date on what you need.  Screening Who needs it How often   Type 2 diabetes or prediabetes All women beginning at age 45 and women without symptoms at any age who are overweight or obese and have 1 or more additional risk factors for diabetes. At  least every 3 years   Type 2 diabetes or prediabetes All women diagnosed with gestational diabetes Lifelong testing every 3 years   Type 2 diabetes All women with prediabetes Every year   Alcohol misuse All women in this age group At routine exams   Blood pressure All women in this age group Yearly checkup if your blood pressure is normal  Normal blood pressure is less than 120/80 mm Hg  If your blood pressure reading is higher than normal, follow the advice of your healthcare provider   Breast cancer All women at average risk in this age group Yearly mammogram should be done until age 54. At age 55, you can switch to every other year or choose to continue yearly.  All women should know the possible benefits and risks of breast cancer screening with mammograms.   Cervical cancer All women in this age group, except women who have had a complete hysterectomy Pap test every 3 years or Pap test with human papillomavirus (HPV) test every 5 years   Chlamydia Women at increased risk for infection At routine exams   Colorectal cancer All women at average risk in this age group Multiple tests are available and are used at different times. Possible tests include:    Flexible sigmoidoscopy every 5 years, or    Colonoscopy every 10 years, or    CT colonography (virtual colonoscopy) every 5 years, or    Yearly fecal occult blood test, or    Yearly fecal immunochemical test every year, or    Stool DNA test, every 3  years  If you choose a test other than a colonoscopy and have an abnormal test result, you will need to follow up with a colonoscopy. Screening advice varies among expert groups. Talk with your healthcare provider about which tests are best for you.  Some people should be screened using a different schedule because of their personal or family health history. Talk with your healthcare provider about your health history.   Depression All women in this age group At routine exams   Gonorrhea Sexually active women at increased risk for infection At routine exams   Hepatitis C Anyone at increased risk; 1 time for those born between 1945 and 1965 At routine exams   High cholesterol or triglycerides All women in this age group who are at risk for coronary artery disease At least every 5 years   HIV All women At routine exams   Lung cancer Adults age 55 to 80 who have smoked Yearly screening in smokers with 30 pack-year history of smoking or who quit within 15 years   Obesity All women in this age group At routine exams   Osteoporosis Women who are postmenopausal Ask your healthcare provider   Syphilis Women at increased risk for infection - talk with your healthcare provider At routine exams   Tuberculosis Women at increased risk for infection - talk with your healthcare provider Ask your healthcare provider   Vision All women in this age group Ask your healthcare provider   Vaccine Who needs it How often   Chickenpox (varicella) All women in this age group who have no record of this infection or vaccine 2 doses; the second dose should be given at least 4 weeks after the first dose   Hepatitis A Women at increased risk for infection - talk with your healthcare provider 2 doses given at least 6 months apart   Hepatitis B Women at increased risk for infection - talk with your healthcare provider 3 doses over 6 months; second dose should be given 1 month after the first dose; the third dose should be given at least 2 months  after the second dose and at least 4 months after the first dose   Haemophilus influenzae Type B (HIB) Women at increased risk for infection - talk with your healthcare provider 1 to 3 doses   Influenza (flu) All women in this age group Once a year   Measles, mumps, rubella (MMR) Women in this age group through their late 50s who have no record of these infections or vaccines 1 dose   Meningococcal Women at increased risk for infection - talk with your healthcare provider 1 or more doses   Pneumococcal conjugate vaccine (PCV13) and pneumococcal polysaccharide vaccine (PPSV23) Women at increased risk for infection - talk with your healthcare provider PCV13: 1 dose ages 19 to 65 (protects against 13 types of pneumococcal bacteria)  PPSV23: 1 to 2 doses through age 64, or 1 dose at 65 or older (protects against 23 types of pneumococcal bacteria)   Tetanus/diphtheria/pertussis (Td/Tdap) booster All women in this age group Td every 10 years, or a 1-time dose of Tdap instead of a Td booster after age 18, then Td every 10 years   Zoster All women ages 60 and older 1 dose   Counseling Who needs it How often   BRCA gene mutation testing for breast and ovarian cancer susceptibility Women with increased risk for having gene mutation When your risk is known   Breast cancer and chemoprevention Women at high risk for breast cancer When your risk is known   Diet and exercise Women who are overweight or obese When diagnosed, and then at routine exams   Sexually transmitted infection prevention Women at increased risk for infection - talk with your healthcare provider At routine exams   Use of daily aspirin Women ages 55 and up in this age group who are at risk for cardiovascular health problems such as stroke When your risk is known   Use of tobacco and the health effects it can cause All women in this age group Every exam   1 American [employer] Society  Date Last Reviewed: 1/26/2016 2000-2019 [employer] [address]  [place] [address]. All rights reserved. This information is not intended as a substitute for professional medical care. Always follow your healthcare professional's instructions.

## 2021-06-18 NOTE — PATIENT INSTRUCTIONS - HE
Patient Instructions by Stephen Sanchez MD at 2/28/2020  2:10 PM     Author: Stephen Sanchez MD Service: -- Author Type: Physician    Filed: 2/28/2020  2:36 PM Encounter Date: 2/28/2020 Status: Signed    : Stephen Sanchez MD (Physician)       Patient Education     Understanding Acute Rhinosinusitis    Acute rhinosinusitis is when the lining of the inside of the nose and the sinuses becomes irritated and swollen. It is also called sinusitis, or a sinus infection.  Sinuses are air-filled spaces in the skull behind the face. They are kept moist and clean by a lining of mucosa. Things such as pollen, smoke, and chemical fumes can irritate the mucosa. It can then swell up. As a response to irritation, the mucosa makes more mucus and other fluids. Tiny hairlike cilia cover the mucosa. Cilia help carry mucus toward the opening of the sinus. Too much mucus may cause the cilia to stop working. This blocks the sinus opening. A buildup of fluid in the sinuses then causes pain and pressure. It can also cause bacteria to grow in the sinuses.  What causes acute rhinosinusitis?  A sinus infection is most often caused by a virus. You are more likely to get one after having a cold or the flu. In some cases, a sinus infection can be caused by bacteria.  You are at higher risk for a sinus infection if you:    Are older in age    Have structural problems with your sinuses    Smoke or are exposed to secondhand smoke    Are exposed to changes in pressure, such as from flying a lot or deep sea diving    Have asthma or allergies    Have a weak immune system    Have dental disease     Symptoms of acute rhinosinusitis  Symptoms of acute rhinosinusitis often last around 7 to 10 days. If you have a bacterial infection, they may last longer. They may also get better but then worsen. You may have:    Face pain or pressure under the eyes and around the nose    Headache    Fluid draining in the back of the throat (postnasal  drip)    Congestion    Drainage that is thick and colored (often green), instead of clear    Cough    Problems with your sense of smell    Ear pain or hearing problems    Fever    Tooth pain    Fatigue  Diagnosing acute rhinosinusitis  Your healthcare provider will ask about your symptoms and past health. He or she will look at your ears, nose, throat, and sinuses. Imaging tests, such as X-rays, are often not needed.  It can be hard to figure out if a sinus infection is caused by a virus or bacterium. A bacterial infection tends to last longer. Symptoms may also get better but then worsen. Your healthcare provider may take a sample of mucus from your nose to check for bacteria.  Treating acute rhinosinusitis  Most sinus infections will go away within 10 days. Your body will fight off the virus. If your symptoms seem to get better but then worsen, you may have a bacterial infection instead. Your healthcare provider will then give you antibiotics. Take this medicine until it is gone, even if you feel better.  To help ease your symptoms, your healthcare provider may advise:    Over-the-counter pain relievers. Medicines such as acetaminophen or ibuprofen can ease sinus pain. They may also lower a fever.    Nasal washes. Washing your nasal passages with salt water may ease pain and pressure. It can rinse out mucous and other irritants from your sinuses. Your healthcare provider can show you how to do it.    Nasal steroid spray. This prescription medicine can reduce inflammation in your sinuses.    Other medicines. Decongestants, antihistamines, and other nasal sprays may give short-term relief. They may help with congestion. Talk with your healthcare provider before taking these medicines.     Preventing acute rhinosinusitis  You can help prevent a sinus infection with these steps:    Wash your hands well and often.    Stay away from people who have a cold or upper respiratory infection.    Don't smoke. And stay away  from secondhand smoke.    Use a humidifier at home.    Make sure you are up-to-date on your vaccines, such as the flu shot.     When to call your healthcare provider  Call your healthcare provider right away if you have any of these:    Fever of 100.4 F (38 C) or higher, or as directed by your healthcare provider    Pain that gets worse    Symptoms that dont get better, or get worse    New symptoms  Date Last Reviewed: 6/1/2019 2000-2019 [employer]. [address] [place] [address]. All rights reserved. This information is not intended as a substitute for professional medical care. Always follow your healthcare professional's instructions.

## 2021-06-18 NOTE — PATIENT INSTRUCTIONS - HE
Patient Instructions by Ze Camarena PA-C at 2/8/2020 12:40 PM     Author: Ze Camarena PA-C Service: -- Author Type: Physician Assistant    Filed: 2/8/2020  2:52 PM Encounter Date: 2/8/2020 Status: Addendum    : Ze Camarena PA-C (Physician Assistant)    Related Notes: Original Note by Ze Camarena PA-C (Physician Assistant) filed at 2/8/2020  2:52 PM       Your rapid influenza test came back negative for flu. You will be treated as if you have the flu.    You are contagious until your fever is gone for 24 hours. Maintain good hand hygiene, cover your cough, and limit contact to prevent spreading the illness. Symptoms typically last 1-2 weeks.    Symptom management:  - Drink plenty of fluids and allow for plenty of rest  - Use tylenol or ibuprofen every 4-6 hours for fever/discomfort    Reasons to be seen immediately for re-evaluation:  - Have trouble breathing or are short of breath  - Feel pain or pressure in your chest or belly  - Get suddenly dizzy  - Feel confused  - Have severe vomiting    If no symptom improvement in 1 week, follow-up with your primary care provider.        Patient Education     Influenza (Adult)    Influenza is also called the flu. It is a viral illness that affects the air passages of your lungs. It is different from the common cold. The flu can easily be passed from one to person to another. It may be spread through the air by coughing and sneezing. Or it can be spread by touching the sick person and then touching your own eyes, nose, or mouth.  The flu starts 1 to 3 days after you are exposed to the flu virus. It may last for 1 to 2 weeks but many people feel tired or fatigued for many weeks afterward. You usually dont need to take antibiotics unless you have a complication. This might be an ear or sinus infection or pneumonia.  Symptoms of the flu may be mild or severe. They can include extreme tiredness (wanting to stay in bed all day), chills, fevers, muscle aches, soreness  with eye movement, headache, and a dry, hacking cough.  Home care  Follow these guidelines when caring for yourself at home:    Avoid being around cigarette smoke, whether yours or other peoples.    Acetaminophen or ibuprofen will help ease your fever, muscle aches, and headache. Dont give aspirin to anyone younger than 18 who has the flu. Aspirin can harm the liver.    Nausea and loss of appetite are common with the flu. Eat light meals. Drink 6 to 8 glasses of liquids every day. Good choices are water, sport drinks, soft drinks without caffeine, juices, tea, and soup. Extra fluids will also help loosen secretions in your nose and lungs.    Over-the-counter cold medicines will not make the flu go away faster. But the medicines may help with coughing, sore throat, and congestion in your nose and sinuses. Dont use a decongestant if you have high blood pressure.    Stay home until your fever has been gone for at least 24 hours without using medicine to reduce fever.  Follow-up care  Follow up with your healthcare provider, or as advised, if you are not getting better over the next week.  If you are age 65 or older, talk with your provider about getting a pneumococcal vaccine every 5 years. You should also get this vaccine if you have chronic asthma or COPD. All adults should get a flu vaccine every fall. Ask your provider about this.  When to seek medical advice  Call your healthcare provider right away if any of these occur:    Cough with lots of colored mucus (sputum) or blood in your mucus    Chest pain, shortness of breath, wheezing, or trouble breathing    Severe headache, or face, neck, or ear pain    New rash with fever    Fever of 100.4 F (38 C) or higher, or as directed by your healthcare provider    Confusion, behavior change, or seizure    Severe weakness or dizziness    You get a new fever or cough after getting better for a few days  Date Last Reviewed: 1/1/2017 2000-2017 [employer] [address]  "[place] [address]. All rights reserved. This information is not intended as a substitute for professional medical care. Always follow your healthcare professional's instructions.            What You Should Know About Influenza (Flu) Antiviral Drugs  Can flu illness be treated?  Yes. There are prescription medications called \"antiviral drugs\" that can be used to treat flu illness.  What are antiviral drugs?  Influenza antiviral drugs are prescription medicines (pills, liquid, or an inhaled powder) that fight against flu in your body. Antiviral drugs are not sold over-the-counter. You can only get them if you have a prescription from a health care provider. Antiviral drugs are different from antibiotics, which fight against bacterial infections.   What should I do if I think I have the flu?   If you get sick with flu, antiviral drugs are a treatment option. Check with your health care provider promptly if you are at high risk of serious flu complications (see the next page for full list of high risk factors) and you get flu symptoms. Flu symptoms can include fever, cough, sore throat, runny or stuffy nose, body aches, headache, chills, and fatigue. Your doctor may prescribe antiviral drugs to treat your flu illness.  Should I still get a flu vaccine?  Yes. Antiviral drugs are not a substitute for getting a flu vaccine. While flu vaccine can vary in how well it works, a flu vaccine is the first and best way to prevent influenza. Antiviral drugs are a second line of defense to treat the flu if you get sick.  What are the benefits of antiviral drugs?  Antiviral treatment works best when started within two days of getting symptoms. Antiviral drugs can lessen fever and other symptoms, and shorten the time you are sick by about one day. They also can prevent serious flu complications, like pneumonia.   For people at high risk of serious flu complications, treatment with an antiviral drug can mean the " difference between having a milder illness versus a very serious illness that could result in a hospital stay. For adults hospitalized with flu illness, some studies have reported that early antiviral treatment can reduce the risk of death.  What antiviral drugs are recommended this flu season?  There are four FDA-approved antiviral drugs recommended by CDC this season: oseltamivir phosphate (available as a generic version or under the trade name Tamiflu ), zanamivir (trade name Relenza ), peramivir (trade name Rapivab ), and baloxavir marboxil (trade name Xofluza ).   Oseltamivir is available as a pill or liquid and zanamivir is a powder that is inhaled. (Zanamivir is not recommended for people with breathing problems like asthma or COPD). Peramivir is given intravenously by a health care provider, and baloxavir is a pill given as a single dose by mouth.  What are the possible side effects of antiviral drugs?   Side effects vary for each medication. For example, the most common side effects for oseltamivir are nausea and vomiting, zanamivir can cause bronchospasm, and peramivir can cause diarrhea.  Other less common side effects also have been reported. Your health care provider can give you more information about these drugs or you can check the Food and Drug Administration (FDA) website for specific information about antiviral drugs, including the 's package insert.   For more information, visit:www.cdc.gov/flu  or call 1-214-FEU-INFOCS HCVG-15-FLU-102 December 07, 2018   When should antiviral drugs be taken for treatment?  Studies show that flu antiviral drugs work best for treatment when they are started within two days of getting sick. However, starting them later can still be helpful, especially if the sick person is at high risk of serious flu complications or is very sick from the flu. Follow instructions for taking these drugs.  How long should antiviral drugs be taken?  To treat flu,  oseltamvir and zanamivir are usually prescribed for 5 days, although people hospitalized with flu may need the medicine for longer than 5 days. Rapivab  is given intravenously for 15 to 30 minutes. Baloxavir is given in a single dose.  Can children take antiviral drugs?  Yes, though this varies by medication. Oseltamivir is recommended by CDC and the American Academy of Pediatrics (AAP) for early treatment of flu in people of any age, and for the prevention of flu in people 3 months and older. Zanamivir is recommended for early treatment of flu in people 7 years and older, and for the prevention of flu in people 5 years and older. Peramivir is recommended for early treatment in people 2 years and older. Baloxavir is recommended for early treatment of flu in people 12 years and older.  Can pregnant and breastfeeding women take antiviral drugs?  Oral oseltamivir is recommended for treatment of pregnant women with flu because compared to other recommended antiviral medications, it has the most studies available to suggest that it is safe and beneficial during pregnancy. Baloxavir is not recommended for pregnant women or breastfeeding mothers.  Who should take antiviral drugs?  It's very important that antiviral drugs be used early to treat people who are very sick with flu (for example, people who are in the hospital) and people who are sick with flu who are at high risk of serious flu complications, either because of their age or because they have a high risk medical condition. Other people also may be treated with antiviral drugs by their health care provider this season. Most people who are otherwise healthy and get the flu, however, do not need to be treated with antiviral drugs.  The following is a list of all the health and age factors that are known to increase a person's risk of getting serious complications from the flu:   Asthma   Blood disorders (such as sickle cell disease)  Chronic lung disease (such as  chronic obstructive pulmonary disease [COPD] and cystic fibrosis)  Endocrine disorders (such as diabetes mellitus)  People who are obese with a body mass index [BMI] of 40 or higher  Heart disease (such as congenital heart disease, congestive heart failure and coronary artery disease)   Kidney disorders  Liver disorders  Metabolic disorders (such as inherited metabolic disorders and mitochondrial disorders)  Neurologic and neurodevelopment conditions  People younger than 19 years old and on long-term aspirin or salicylate-containing medications  People with a weakened immune system due to disease (such as people with HIV or AIDS, or some cancers such as leukemia) or medications (such as those receiving chemotherapy or radiation treatment for cancer, or persons with chronic conditions requiring chronic corticosteroids or other drugs that suppress the immune system)    Other people at high risk from the flu:  Adults 65 years and older  Children younger than 2 years old1  Pregnant women and women up to 2 weeks after the end of pregnancy  American Indians and [place] Natives  People who live in nursing homes and other long-term-care facilities    1 Although all children younger than 5 years old are considered at high risk for serious flu complications, the highest risk is for those younger than 2 years old, with the highest hospitalization and death rates among infants younger than 6 months old.  It is especially important that these people get a flu vaccine and seek medical treatment quickly if they get flu syptoms

## 2021-06-20 NOTE — LETTER
Letter by Severson, Tammie F, LPN at      Author: Severson, Tammie F, LPN Service: -- Author Type: --    Filed:  Encounter Date: 7/13/2020 Status: (Other)       7/13/2020        Sonia HERR Wiyesika  [address] [facility] [place] [address]    COVID-19 Antibody Screen   Date Value Ref Range Status   07/08/2020 Negative  Final     Comment:     No COVID-19 antibodies detected.  Patients within 10 days of symptom onset for  COVID-19 may not produce sufficient levels of detectable antibodies.  Immunocompromised COVID-19 patients may take longer to develop antibodies.     COVID-19 IgG Titer   Date Value Ref Range Status   07/08/2020 Not Applicable  Final     Comment:     Qualitative screen for total antibodies to COVID-19 (SARS-CoV-2) with  semi-quantitative measurement of IgG COVID-19 antibodies by endpoint titer.  COVID-19 antibodies may be elevated due to a past or current infection.  Negative results do not rule out COVID-19 infection.  Results from antibody  testing should not be used as the sole basis to diagnose or exclude SARS-CoV-2  infection or to inform infection status.  COVID-19 PCR test should be ordered  if current infection is suspected.  False positive results may occur in rare  cases due to cross-reacting antibodies.  This test was developed and its performance characteristics determined by the  [facility] ([facility]),  which is regulated under CLIA as qualified to perform high-complexity testing.  This test has not been reviewed by the FDA.  Testing performed by [facility], [facility], [address], [address], [place] [address]       No results found for: [id number]    You have tested NEGATIVE for COVID-19 antibodies. This suggests you have not had or been exposed to COVID-19. But it does not mean that for sure.    The test finds antibodies in most people 10 days after they get sick. For some people, it  takes longer than 10 days for antibodies to show up. Others may never show antibodies against COVID-19, especially if they have weak immune systems.    If you have COVID-19 symptoms now, please stay home and away from others.     Your current symptoms may or may not be COVID-19.     What is antibody testing?  This is a kind of blood test. We take a small sample of your blood, and then test it for something called antibodies.   Your body makes antibodies to fight infection. If your blood has antibodies for a certain germ, it means youve been infected with that germ in the past.   Sometimes, antibodies stay in your body for years after youve had the infection. They can be there even if the germ didnt make you sick. They are a sign that your body fought off the infection.  Will this test find antibodies in everyone whos had COVID-19?  No. The test finds antibodies in most people 10 days after they get sick. For some people, it takes longer than 10 days for antibodies to show up. Others may never show antibodies against COVID-19, especially if they have weak immune systems.  What are the signs of COVID-19?  Signs of COVID-19 can appear from 2 to 14 days (up to 2 weeks) after youre infected. Some people have no symptoms or only mild symptoms. Others get very sick. The most common symptoms are:      Cough    Shortness of breath or trouble breathing    Or at least 2 of these symptoms:      Fever    Chills    Repeated shaking with chills    Muscle pain    Headache    Sore throat    Losing your sense of taste or smell    You may have other symptoms. Please contact your doctor or clinic for any symptoms that worry you.    Where can I get more information?     To learn the [facility] guidelines for staying home, please visit the [facility] website at [email or web address]    To learn more about COVID-19 and how to care for yourself at home, please visit the CDC  website at https://www.cdc.gov/coronavirus/2019-ncov/about/steps-when-sick.html    For more options for care at [facility], please visit our website at [email or web address]    [place] [facility] ([facility]) COVID-19 Hotline:  [phone]

## 2021-06-28 NOTE — PROGRESS NOTES
Progress Notes by Ze Camarena PA-C at 2020 12:40 PM     Author: Ze Camarena PA-C Service: -- Author Type: Physician Assistant    Filed: 3/16/2020  3:27 AM Encounter Date: 2020 Status: Signed    : Ze Camarena PA-C (Physician Assistant)       Subjective:      Patient ID: Sonia Carter is a 59 y.o. female.    Chief Complaint:    HPI     Sonia Carter is a 59 y.o. female who presents today complaining of one day acute onset of Influenza like illness symptoms to include fever, dry nonproductive cough, sore throat, odynophagia, rhinorrhea, myalgias, arthralgias, headache and fatigue.      Patient had acute onset of all the above symptoms.    Patient has not had a seasonal influenza immunization.    Last dose of antipyretic.  None.  Temperature in the office is currently 100.4.    Anorexia: NO    Patient is taking fluids and is micturating.        No past medical history on file.    Past Surgical History:   Procedure Laterality Date   ? HIATAL HERNIA REPAIR     ? [place]  DELIVERY [place]      Description:  Section;  Proc Date: 1990;   ? [place]  DELIVERY ONLY      Description:  Section;  Proc Date: 1997;   ? [place]  DELIVERY ONLY      Description:  Section;  Proc Date: 1999;   ? [place] LIGATE FALLOPIAN TUBE      Description: Tubal Ligation;  Proc Date: 1999;       Family History   Problem Relation Age of Onset   ? Breast cancer Paternal Aunt    ? Sleep apnea Brother    ? Snoring Brother    ? Sleep apnea Brother    ? Snoring Brother    ? Celiac disease Daughter 20   ? Hypertension Father    ? Stroke Father 77       Social History     Tobacco Use   ? Smoking status: Never Smoker   ? Smokeless tobacco: Never Used   Substance Use Topics   ? Alcohol use: No   ? Drug use: Never       Review of Systems  As above in HPI, otherwise balance of Review of Systems are negative.    Objective:     /79   Pulse (!) 105   Temp 100.4  F (38  C) (Oral)    Resp 18   Wt 182 lb (82.6 kg)   LMP 08/31/2014   SpO2 98%   BMI 30.76 kg/m      Physical Exam  General: Patient is resting comfortably no acute distress is febrile  HEENT: Head is normocephalic atraumatic   eyes are PERRL EOMI sclera anicteric   TMs are clear bilaterally  Throat is without pharyngeal wall erythema and no exudate  No cervical lymphadenopathy present  LUNGS: Clear to auscultation bilaterally  HEART: Regular rate and rhythm  Skin: Without rash non-diaphoretic    Lab:  Recent Results (from the past 24 hour(s))   Influenza A/B Rapid Test- Nasal Swab   Result Value Ref Range    Influenza  A, Rapid Antigen No Influenza A antigen detected No Influenza A antigen detected    Influenza B, Rapid Antigen No Influenza B antigen detected No Influenza B antigen detected       Assessment:     Procedures    The primary encounter diagnosis was Influenza-like illness. A diagnosis of Flu-like symptoms was also pertinent to this visit.    Plan:     1. Influenza-like illness  oseltamivir (TAMIFLU) 75 MG capsule   2. Flu-like symptoms  Influenza A/B Rapid Test- Nasal Swab         Patient Instructions   Your rapid influenza test came back negative for flu. You will be treated as if you have the flu.    You are contagious until your fever is gone for 24 hours. Maintain good hand hygiene, cover your cough, and limit contact to prevent spreading the illness. Symptoms typically last 1-2 weeks.    Symptom management:  - Drink plenty of fluids and allow for plenty of rest  - Use tylenol or ibuprofen every 4-6 hours for fever/discomfort    Reasons to be seen immediately for re-evaluation:  - Have trouble breathing or are short of breath  - Feel pain or pressure in your chest or belly  - Get suddenly dizzy  - Feel confused  - Have severe vomiting    If no symptom improvement in 1 week, follow-up with your primary care provider.        Patient Education     Influenza (Adult)    Influenza is also called the flu. It is a viral  illness that affects the air passages of your lungs. It is different from the common cold. The flu can easily be passed from one to person to another. It may be spread through the air by coughing and sneezing. Or it can be spread by touching the sick person and then touching your own eyes, nose, or mouth.  The flu starts 1 to 3 days after you are exposed to the flu virus. It may last for 1 to 2 weeks but many people feel tired or fatigued for many weeks afterward. You usually dont need to take antibiotics unless you have a complication. This might be an ear or sinus infection or pneumonia.  Symptoms of the flu may be mild or severe. They can include extreme tiredness (wanting to stay in bed all day), chills, fevers, muscle aches, soreness with eye movement, headache, and a dry, hacking cough.  Home care  Follow these guidelines when caring for yourself at home:    Avoid being around cigarette smoke, whether yours or other peoples.    Acetaminophen or ibuprofen will help ease your fever, muscle aches, and headache. Dont give aspirin to anyone younger than 18 who has the flu. Aspirin can harm the liver.    Nausea and loss of appetite are common with the flu. Eat light meals. Drink 6 to 8 glasses of liquids every day. Good choices are water, sport drinks, soft drinks without caffeine, juices, tea, and soup. Extra fluids will also help loosen secretions in your nose and lungs.    Over-the-counter cold medicines will not make the flu go away faster. But the medicines may help with coughing, sore throat, and congestion in your nose and sinuses. Dont use a decongestant if you have high blood pressure.    Stay home until your fever has been gone for at least 24 hours without using medicine to reduce fever.  Follow-up care  Follow up with your healthcare provider, or as advised, if you are not getting better over the next week.  If you are age 65 or older, talk with your provider about getting a pneumococcal vaccine every 5  "years. You should also get this vaccine if you have chronic asthma or COPD. All adults should get a flu vaccine every fall. Ask your provider about this.  When to seek medical advice  Call your healthcare provider right away if any of these occur:    Cough with lots of colored mucus (sputum) or blood in your mucus    Chest pain, shortness of breath, wheezing, or trouble breathing    Severe headache, or face, neck, or ear pain    New rash with fever    Fever of 100.4 F (38 C) or higher, or as directed by your healthcare provider    Confusion, behavior change, or seizure    Severe weakness or dizziness    You get a new fever or cough after getting better for a few days  Date Last Reviewed: 1/1/2017 2000-2017 [employer]. [address] [place] [address], [place] [id number]. All rights reserved. This information is not intended as a substitute for professional medical care. Always follow your healthcare professional's instructions.            What You Should Know About Influenza (Flu) Antiviral Drugs  Can flu illness be treated?  Yes. There are prescription medications called \"antiviral drugs\" that can be used to treat flu illness.  What are antiviral drugs?  Influenza antiviral drugs are prescription medicines (pills, liquid, or an inhaled powder) that fight against flu in your body. Antiviral drugs are not sold over-the-counter. You can only get them if you have a prescription from a health care provider. Antiviral drugs are different from antibiotics, which fight against bacterial infections.   What should I do if I think I have the flu?   If you get sick with flu, antiviral drugs are a treatment option. Check with your health care provider promptly if you are at high risk of serious flu complications (see the next page for full list of high risk factors) and you get flu symptoms. Flu symptoms can include fever, cough, sore throat, runny or stuffy nose, body aches, headache, chills, and fatigue. Your doctor " may prescribe antiviral drugs to treat your flu illness.  Should I still get a flu vaccine?  Yes. Antiviral drugs are not a substitute for getting a flu vaccine. While flu vaccine can vary in how well it works, a flu vaccine is the first and best way to prevent influenza. Antiviral drugs are a second line of defense to treat the flu if you get sick.  What are the benefits of antiviral drugs?  Antiviral treatment works best when started within two days of getting symptoms. Antiviral drugs can lessen fever and other symptoms, and shorten the time you are sick by about one day. They also can prevent serious flu complications, like pneumonia.   For people at high risk of serious flu complications, treatment with an antiviral drug can mean the difference between having a milder illness versus a very serious illness that could result in a hospital stay. For adults hospitalized with flu illness, some studies have reported that early antiviral treatment can reduce the risk of death.  What antiviral drugs are recommended this flu season?  There are four FDA-approved antiviral drugs recommended by CDC this season: oseltamivir phosphate (available as a generic version or under the trade name Tamiflu ), zanamivir (trade name Relenza ), peramivir (trade name Rapivab ), and baloxavir marboxil (trade name Xofluza ).   Oseltamivir is available as a pill or liquid and zanamivir is a powder that is inhaled. (Zanamivir is not recommended for people with breathing problems like asthma or COPD). Peramivir is given intravenously by a health care provider, and baloxavir is a pill given as a single dose by mouth.  What are the possible side effects of antiviral drugs?   Side effects vary for each medication. For example, the most common side effects for oseltamivir are nausea and vomiting, zanamivir can cause bronchospasm, and peramivir can cause diarrhea.  Other less common side effects also have been reported. Your health care provider  can give you more information about these drugs or you can check the Food and Drug Administration (FDA) website for specific information about antiviral drugs, including the 's package insert.   For more information, visit:www.cdc.gov/flu  or call 2-429-HYK-INFOCS HCVG-15-FLU-102 December 07, 2018   When should antiviral drugs be taken for treatment?  Studies show that flu antiviral drugs work best for treatment when they are started within two days of getting sick. However, starting them later can still be helpful, especially if the sick person is at high risk of serious flu complications or is very sick from the flu. Follow instructions for taking these drugs.  How long should antiviral drugs be taken?  To treat flu, oseltamvir and zanamivir are usually prescribed for 5 days, although people hospitalized with flu may need the medicine for longer than 5 days. Rapivab  is given intravenously for 15 to 30 minutes. Baloxavir is given in a single dose.  Can children take antiviral drugs?  Yes, though this varies by medication. Oseltamivir is recommended by CDC and the American Academy of Pediatrics (AAP) for early treatment of flu in people of any age, and for the prevention of flu in people 3 months and older. Zanamivir is recommended for early treatment of flu in people 7 years and older, and for the prevention of flu in people 5 years and older. Peramivir is recommended for early treatment in people 2 years and older. Baloxavir is recommended for early treatment of flu in people 12 years and older.  Can pregnant and breastfeeding women take antiviral drugs?  Oral oseltamivir is recommended for treatment of pregnant women with flu because compared to other recommended antiviral medications, it has the most studies available to suggest that it is safe and beneficial during pregnancy. Baloxavir is not recommended for pregnant women or breastfeeding mothers.  Who should take antiviral drugs?  It's very  important that antiviral drugs be used early to treat people who are very sick with flu (for example, people who are in the hospital) and people who are sick with flu who are at high risk of serious flu complications, either because of their age or because they have a high risk medical condition. Other people also may be treated with antiviral drugs by their health care provider this season. Most people who are otherwise healthy and get the flu, however, do not need to be treated with antiviral drugs.  The following is a list of all the health and age factors that are known to increase a person's risk of getting serious complications from the flu:   Asthma   Blood disorders (such as sickle cell disease)  Chronic lung disease (such as chronic obstructive pulmonary disease [COPD] and cystic fibrosis)  Endocrine disorders (such as diabetes mellitus)  People who are obese with a body mass index [BMI] of 40 or higher  Heart disease (such as congenital heart disease, congestive heart failure and coronary artery disease)   Kidney disorders  Liver disorders  Metabolic disorders (such as inherited metabolic disorders and mitochondrial disorders)  Neurologic and neurodevelopment conditions  People younger than 19 years old and on long-term aspirin or salicylate-containing medications  People with a weakened immune system due to disease (such as people with HIV or AIDS, or some cancers such as leukemia) or medications (such as those receiving chemotherapy or radiation treatment for cancer, or persons with chronic conditions requiring chronic corticosteroids or other drugs that suppress the immune system)    Other people at high risk from the flu:  Adults 65 years and older  Children younger than 2 years old1  Pregnant women and women up to 2 weeks after the end of pregnancy  American Indians and [place] Natives  People who live in nursing homes and other long-term-care facilities    1 Although all children younger than 5 years  old are considered at high risk for serious flu complications, the highest risk is for those younger than 2 years old, with the highest hospitalization and death rates among infants younger than 6 months old.  It is especially important that these people get a flu vaccine and seek medical treatment quickly if they get flu syptoms

## 2021-06-30 NOTE — PROGRESS NOTES
Progress Notes by Stephen Sanchez MD at 3/11/2021  9:10 AM     Author: Stephen Sanchez MD Service: -- Author Type: Physician    Filed: 3/12/2021  7:33 AM Encounter Date: 3/11/2021 Status: Signed    : Stephen Sanchez MD (Physician)       FEMALE PREVENTATIVE EXAM    Assessment and Plan:     1. Annual physical exam  Comprehensive Metabolic Panel    Lipid Cascade FASTING    HIV Antigen/Antibody Screening Cascade    Vitamin D, Total (25-Hydroxy)    Thyroid Cascade   2. Malodorous urine  Urinalysis-UC if Indicated     Health maintenance exam and labs as above.  Patient will follow with GYN for Pap and hormone management  Patient has been advised of split billing requirements and indicates understanding: Yes      Next follow up:  Return in about 1 year (around 3/11/2022) for Annual physical.    Immunization Review  Adult Imm Review: No immunizations due today  BMI: 30.72      I discussed the following with the patient:   Adult Healthy Living: Importance of regular exercise  Healthy nutrition    I have had an Advance Directives discussion with the patient.    Subjective:   Chief Complaint: Sonia Carter is an 60 y.o. female here for a preventative health visit.  Chief Complaint   Patient presents with   ? Annual Exam     Pt is fasting today, pap smear is due but does see an OB, declined breast exam.      HPI: Patient is here today for annual physical.  She denies any acute concerns.  She would like vitamin D to be checked.  In the past she has been low in vitamin D.  She sometimes feels this order in urine.  We did check a UA today and now she feels reassured.  Offered further work-up if persisting but patient defers it.  She had questions about memory decline.  Does not feel that she has memory loss but sometimes gets worried about it.  She gets about 2x2 use the bathroom at night and then sometimes she is unable to sleep.  Melatonin helps.    Healthy Habits  Are you taking a daily aspirin? No  Do you  "typically exercising at least 40 min, 3-4 times per week?  NO  Do you usually eat at least 4 servings of fruit and vegetables a day, include whole grains and fiber and avoid regularly eating high fat foods? NO  Have you had an eye exam in the past two years? Yes  Do you see a dentist twice per year? Yes  Do you have any concerns regarding sleep? YES  Awakens 2 times to urinate, takes melatonin    Safety Screen    Do you feel you are safe where you are living?: Yes (3/11/2021  9:04 AM)  Do you feel you are safe in your relationship(s)?: Yes (3/11/2021  9:04 AM)      Review of Systems:  Please see above.  The rest of the review of systems are negative for all systems.     Pap History:   Last 3 PAP results:  No results found for: PAP  Cancer Screening       Status Date      PAP SMEAR Overdue 2/4/2019      Done 2/4/2014 GYNECOLOGIC CYTOLOGY (PAP SMEAR)     Patient has more history with this topic...    MAMMOGRAM Next Due 3/16/2022      Done 3/16/2020 Ext Proc: [place] SCR MAMMO BI INCL CAD     Patient has more history with this topic...          Patient Care Team:  Stephen Sanchez MD as PCP - General (Family Medicine)  Stephen Sanchez MD as Assigned PCP        History     Reviewed By Date/Time Sections Reviewed    Stephne Sanchez MD 3/11/2021  9:48 AM Family    Stephen Sanchez MD 3/11/2021  9:12 AM Medical, Surgical, Tobacco, Alcohol, Drug Use, Family    Teodora Forrester CMA 3/11/2021  9:04 AM Tobacco            Objective:   Vital Signs:   Visit Vitals  /63 (Patient Site: Left Arm, Patient Position: Sitting, Cuff Size: Adult Large)   Pulse 70   Ht 5' 4.5\" (1.638 m)   Wt 181 lb 12.8 oz (82.5 kg)   LMP 08/31/2014   SpO2 100%   BMI 30.72 kg/m           PHYSICAL EXAM  GENERAL: Healthy, alert and no distress  EYES: Eyes grossly normal to inspection. No discharge or erythema, or obvious scleral/conjunctival abnormalities.  RESP: No audible wheeze, cough, or visible cyanosis.  No visible retractions or increased " work of breathing.    NEURO: Cranial nerves grossly intact. Mentation and speech appropriate for age.  PSYCH: Mentation appears normal, affect normal/bright, judgement and insight intact, normal speech and appearance well-groomed      The 10-year ASCVD risk score (Arthur PUCKETT Jr., et al., 2013) is: 3.9%    Values used to calculate the score:      Age: 60 years      Sex: Female      Is Non- : No      Diabetic: No      Tobacco smoker: No      Systolic Blood Pressure: 128 mmHg      Is BP treated: No      HDL Cholesterol: 51 mg/dL      Total Cholesterol: 236 mg/dL         Medication List          Accurate as of March 11, 2021 11:59 PM. If you have any questions, ask your nurse or doctor.            CONTINUE taking these medications    ESTROGENS-METHYLTESTOSTERONE ORAL  INSTRUCTIONS: Take by mouth daily.        medroxyPROGESTERone 2.5 MG tablet  Also known as: PROVERA  INSTRUCTIONS: TAKE 1 TABLET BY MOUTH DAILY FOR 90 DAYS               Additional Screenings Completed Today:

## 2021-07-21 ENCOUNTER — RECORDS - HEALTHEAST (OUTPATIENT)
Dept: ADMINISTRATIVE | Facility: CLINIC | Age: 61
End: 2021-07-21

## 2021-07-23 ENCOUNTER — TELEPHONE (OUTPATIENT)
Dept: FAMILY MEDICINE | Facility: CLINIC | Age: 61
End: 2021-07-23

## 2021-07-23 DIAGNOSIS — E78.00 PURE HYPERCHOLESTEROLEMIA: Primary | ICD-10-CM

## 2021-07-23 NOTE — TELEPHONE ENCOUNTER
Reason for Call:  Other order     Detailed comments: pt saw OBGYN- they recommend that pt get a calcium scores so pt is asking if Dr WEST can order     Phone Number Patient can be reached at: Cell number on file:    Telephone Information:   Mobile [phone]       Best Time: na    Can we leave a detailed message on this number? YES    Call taken on 7/23/2021 at 2:45 PM by Carmen Wood

## 2021-08-03 ENCOUNTER — HOSPITAL ENCOUNTER (OUTPATIENT)
Dept: CT IMAGING | Facility: CLINIC | Age: 61
Discharge: HOME | End: 2021-08-03
Attending: FAMILY MEDICINE | Admitting: FAMILY MEDICINE
Payer: COMMERCIAL

## 2021-08-03 DIAGNOSIS — E78.00 PURE HYPERCHOLESTEROLEMIA: ICD-10-CM

## 2021-08-03 LAB
CV CALCIUM SCORE AGATSTON LM: 0
CV CALCIUM SCORING AGATSON LAD: 0
CV CALCIUM SCORING AGATSTON CX: 0
CV CALCIUM SCORING AGATSTON RCA: 0
CV CALCIUM SCORING AGATSTON TOTAL: 0

## 2021-08-03 PROCEDURE — 75571 CT HRT W/O DYE W/CA TEST: CPT

## 2021-08-03 PROCEDURE — 75571 CT HRT W/O DYE W/CA TEST: CPT | Mod: 26 | Performed by: INTERNAL MEDICINE

## 2021-10-17 ENCOUNTER — HEALTH MAINTENANCE LETTER (OUTPATIENT)
Age: 61
End: 2021-10-17

## 2021-12-17 ENCOUNTER — IMMUNIZATION (OUTPATIENT)
Dept: NURSING | Facility: CLINIC | Age: 61
End: 2021-12-17
Payer: COMMERCIAL

## 2021-12-17 PROCEDURE — 91306 COVID-19,PF,MODERNA (18+ YRS BOOSTER .25ML): CPT

## 2021-12-17 PROCEDURE — 0064A COVID-19,PF,MODERNA (18+ YRS BOOSTER .25ML): CPT

## 2022-01-13 ENCOUNTER — TRANSFERRED RECORDS (OUTPATIENT)
Dept: HEALTH INFORMATION MANAGEMENT | Facility: CLINIC | Age: 62
End: 2022-01-13
Payer: COMMERCIAL

## 2022-02-02 ENCOUNTER — OFFICE VISIT (OUTPATIENT)
Dept: FAMILY MEDICINE | Facility: CLINIC | Age: 62
End: 2022-02-02
Payer: COMMERCIAL

## 2022-02-02 VITALS
HEIGHT: 65 IN | BODY MASS INDEX: 30.32 KG/M2 | DIASTOLIC BLOOD PRESSURE: 71 MMHG | TEMPERATURE: 98.3 F | WEIGHT: 182 LBS | HEART RATE: 69 BPM | SYSTOLIC BLOOD PRESSURE: 150 MMHG

## 2022-02-02 DIAGNOSIS — N30.01 ACUTE CYSTITIS WITH HEMATURIA: ICD-10-CM

## 2022-02-02 LAB
ALBUMIN UR-MCNC: NEGATIVE MG/DL
APPEARANCE UR: CLEAR
BACTERIA #/AREA URNS HPF: ABNORMAL /HPF
BILIRUB UR QL STRIP: NEGATIVE
COLOR UR AUTO: YELLOW
GLUCOSE UR STRIP-MCNC: NEGATIVE MG/DL
HGB UR QL STRIP: ABNORMAL
KETONES UR STRIP-MCNC: 15 MG/DL
LEUKOCYTE ESTERASE UR QL STRIP: NEGATIVE
NITRATE UR QL: NEGATIVE
PH UR STRIP: 5 [PH] (ref 5–8)
RBC #/AREA URNS AUTO: ABNORMAL /HPF
SP GR UR STRIP: >=1.03 (ref 1–1.03)
SQUAMOUS #/AREA URNS AUTO: ABNORMAL /LPF
UROBILINOGEN UR STRIP-ACNC: 0.2 E.U./DL
WBC #/AREA URNS AUTO: ABNORMAL /HPF

## 2022-02-02 PROCEDURE — 99213 OFFICE O/P EST LOW 20 MIN: CPT | Performed by: FAMILY MEDICINE

## 2022-02-02 PROCEDURE — 81001 URINALYSIS AUTO W/SCOPE: CPT | Performed by: FAMILY MEDICINE

## 2022-02-02 PROCEDURE — 87086 URINE CULTURE/COLONY COUNT: CPT

## 2022-02-02 RX ORDER — FAMOTIDINE 10 MG
10 TABLET ORAL AT BEDTIME
COMMUNITY

## 2022-02-02 RX ORDER — SULFAMETHOXAZOLE/TRIMETHOPRIM 800-160 MG
1 TABLET ORAL 2 TIMES DAILY
Qty: 6 TABLET | Refills: 0 | Status: SHIPPED | OUTPATIENT
Start: 2022-02-02 | End: 2022-02-05

## 2022-02-02 ASSESSMENT — MIFFLIN-ST. JEOR: SCORE: 1383.49

## 2022-02-02 NOTE — PROGRESS NOTES
"  Assessment & Plan     Acute cystitis with hematuria  2-week history of malodorous urine with recent hematuria.  Given symptoms and UA revealing blood, will begin treatment for UTI.  Await urine culture to determine if antibiotic is appropriate.  Reviewed return precautions for ascending infection warranting immediate follow-up.  - UA Macro with Reflex to Micro and Culture - lab collect  - Urine Microscopic  - sulfamethoxazole-trimethoprim (BACTRIM DS) 800-160 MG tablet  Dispense: 6 tablet; Refill: 0    ShilohDO CARMENZA Andre [facility]    Subjective   Eboni Carter is a 61 year old who presents for the following health issues  Chief Complaint   Patient presents with     UTI     low abdominal pain, pink in urine, odor to urine       HPI     2-week history of foul-smelling urine and then 1/31/2022 she wiped and saw pink-tinged fluid x2.  Also has been having lower abdominal cramping like menstrual cramps.  She has been using a heating pad with some relief.  She has no dysuria, and often has urinary frequency which is normal for her.  No prior history of UTIs.  She denies new low back pain, fevers, chills, nausea, vomiting.  She has been feeling more crummy than normal.  She has no known allergies to antibiotics      Objective    BP (!) 150/71   Pulse 69   Temp 98.3  F (36.8  C) (Oral)   Ht 1.638 m (5' 4.5\")   Wt 82.6 kg (182 lb)   BMI 30.76 kg/m    Body mass index is 30.76 kg/m .  Physical Exam   GENERAL: healthy, alert and no distress  RESP: lungs clear to auscultation - no rales, rhonchi or wheezes  CV: regular rate and rhythm, normal S1 S2, no S3 or S4, no murmur, click or rub  ABDOMEN: soft, nontender, no hepatosplenomegaly, no masses , no CVA tenderness  PSYCH: mentation appears normal, affect normal/bright    Results for orders placed or performed in visit on 02/02/22 (from the past 24 hour(s))   UA Macro with Reflex to Micro and Culture - lab collect    Specimen: Urine, NOS "   Result Value Ref Range    Color Urine Yellow Colorless, Straw, Light Yellow, Yellow    Appearance Urine Clear Clear    Glucose Urine Negative Negative mg/dL    Bilirubin Urine Negative Negative    Ketones Urine 15  (A) Negative mg/dL    Specific Gravity Urine >=1.030 1.005 - 1.030    Blood Urine Moderate (A) Negative    pH Urine 5.0 5.0 - 8.0    Protein Albumin Urine Negative Negative mg/dL    Urobilinogen Urine 0.2 0.2, 1.0 E.U./dL    Nitrite Urine Negative Negative    Leukocyte Esterase Urine Negative Negative

## 2022-02-02 NOTE — PATIENT INSTRUCTIONS

## 2022-02-03 DIAGNOSIS — N30.01 ACUTE CYSTITIS WITH HEMATURIA: ICD-10-CM

## 2022-02-04 LAB — BACTERIA UR CULT: NO GROWTH

## 2022-02-07 ENCOUNTER — TELEPHONE (OUTPATIENT)
Dept: FAMILY MEDICINE | Facility: CLINIC | Age: 62
End: 2022-02-07
Payer: COMMERCIAL

## 2022-02-07 NOTE — TELEPHONE ENCOUNTER
Called patient regarding her questions.  Reviewed urine culture was negative and she does not need to complete the antibiotics.  She has not had visible blood in her urine since the one occurrence.  She is scheduled for follow-up with her gynecologist next week where they will perform an exam and additional testing as needed for the blood.  If no gynecologic site is identified, should investigate urologic cause.    Shiloh Astorga, DO

## 2022-02-07 NOTE — TELEPHONE ENCOUNTER
Patient calling back. Read message from Dr Astorga but patient has too many questions and would like to talk to doctor or nurse. Please call patient back

## 2022-02-07 NOTE — TELEPHONE ENCOUNTER
Called and spoke to patient. I informed her about results. Patient has many questions I am not able to answer. Should she still be taking antibiotics? She hasn't been taking them. Should she be worried about her ketones? Should she be concerned about the blood in her urine?  Patient would like a call back. Please advise.

## 2022-02-07 NOTE — TELEPHONE ENCOUNTER
----- Message from Shiloh Astorga DO sent at 2/7/2022  7:03 AM CST -----  Patient has not responded to my Google message- please call:    How are your urinary symptoms? Your urine culture did not grow bacteria so if you are still having symptoms we should see you back to do pelvic exam and consider alternative diagnoses.    Shiloh Astorga, DO

## 2022-03-01 ENCOUNTER — TRANSFERRED RECORDS (OUTPATIENT)
Dept: HEALTH INFORMATION MANAGEMENT | Facility: CLINIC | Age: 62
End: 2022-03-01
Payer: COMMERCIAL

## 2022-03-07 ENCOUNTER — TRANSFERRED RECORDS (OUTPATIENT)
Dept: HEALTH INFORMATION MANAGEMENT | Facility: CLINIC | Age: 62
End: 2022-03-07
Payer: COMMERCIAL

## 2022-03-15 ENCOUNTER — TRANSFERRED RECORDS (OUTPATIENT)
Dept: HEALTH INFORMATION MANAGEMENT | Facility: CLINIC | Age: 62
End: 2022-03-15
Payer: COMMERCIAL

## 2022-05-29 ENCOUNTER — HEALTH MAINTENANCE LETTER (OUTPATIENT)
Age: 62
End: 2022-05-29

## 2022-06-29 ENCOUNTER — TRANSFERRED RECORDS (OUTPATIENT)
Dept: HEALTH INFORMATION MANAGEMENT | Facility: CLINIC | Age: 62
End: 2022-06-29

## 2022-10-02 ENCOUNTER — HEALTH MAINTENANCE LETTER (OUTPATIENT)
Age: 62
End: 2022-10-02

## 2022-10-18 ENCOUNTER — LAB REQUISITION (OUTPATIENT)
Dept: LAB | Facility: CLINIC | Age: 62
End: 2022-10-18

## 2022-10-18 DIAGNOSIS — Z12.4 ENCOUNTER FOR SCREENING FOR MALIGNANT NEOPLASM OF CERVIX: ICD-10-CM

## 2022-10-18 DIAGNOSIS — R31.9 HEMATURIA, UNSPECIFIED: ICD-10-CM

## 2022-10-18 LAB
ALBUMIN UR-MCNC: 10 MG/DL
APPEARANCE UR: ABNORMAL
BILIRUB UR QL STRIP: NEGATIVE
COLOR UR AUTO: ABNORMAL
GLUCOSE UR STRIP-MCNC: NEGATIVE MG/DL
HGB UR QL STRIP: ABNORMAL
KETONES UR STRIP-MCNC: NEGATIVE MG/DL
LEUKOCYTE ESTERASE UR QL STRIP: NEGATIVE
MUCOUS THREADS #/AREA URNS LPF: PRESENT /LPF
NITRATE UR QL: NEGATIVE
PH UR STRIP: 5.5 [PH] (ref 5–7)
RBC URINE: 5 /HPF
SP GR UR STRIP: 1.03 (ref 1–1.03)
UROBILINOGEN UR STRIP-MCNC: NORMAL MG/DL
WBC URINE: 0 /HPF

## 2022-10-18 PROCEDURE — G0145 SCR C/V CYTO,THINLAYER,RESCR: HCPCS | Performed by: OBSTETRICS & GYNECOLOGY

## 2022-10-18 PROCEDURE — 81001 URINALYSIS AUTO W/SCOPE: CPT | Performed by: OBSTETRICS & GYNECOLOGY

## 2022-10-18 PROCEDURE — 87086 URINE CULTURE/COLONY COUNT: CPT | Performed by: OBSTETRICS & GYNECOLOGY

## 2022-10-20 LAB
BACTERIA UR CULT: NORMAL
BKR LAB AP GYN ADEQUACY: NORMAL
BKR LAB AP GYN INTERPRETATION: NORMAL
BKR LAB AP HPV REFLEX: NORMAL
BKR LAB AP LMP: NORMAL
BKR LAB AP PREVIOUS ABNL DX: NORMAL
BKR LAB AP PREVIOUS ABNORMAL: NORMAL
PATH REPORT.COMMENTS IMP SPEC: NORMAL
PATH REPORT.COMMENTS IMP SPEC: NORMAL
PATH REPORT.RELEVANT HX SPEC: NORMAL

## 2022-10-25 ENCOUNTER — ALLIED HEALTH/NURSE VISIT (OUTPATIENT)
Dept: FAMILY MEDICINE | Facility: CLINIC | Age: 62
End: 2022-10-25
Payer: COMMERCIAL

## 2022-10-25 DIAGNOSIS — Z23 NEED FOR PROPHYLACTIC VACCINATION AND INOCULATION AGAINST INFLUENZA: Primary | ICD-10-CM

## 2022-10-25 PROCEDURE — 99207 PR NO CHARGE NURSE ONLY: CPT

## 2022-10-25 PROCEDURE — 90682 RIV4 VACC RECOMBINANT DNA IM: CPT

## 2022-10-25 PROCEDURE — 90471 IMMUNIZATION ADMIN: CPT

## 2022-11-02 ENCOUNTER — OFFICE VISIT (OUTPATIENT)
Dept: FAMILY MEDICINE | Facility: CLINIC | Age: 62
End: 2022-11-02
Payer: COMMERCIAL

## 2022-11-02 VITALS
SYSTOLIC BLOOD PRESSURE: 132 MMHG | OXYGEN SATURATION: 98 % | HEART RATE: 85 BPM | BODY MASS INDEX: 30.16 KG/M2 | RESPIRATION RATE: 18 BRPM | DIASTOLIC BLOOD PRESSURE: 78 MMHG | WEIGHT: 181 LBS | TEMPERATURE: 98.4 F | HEIGHT: 65 IN

## 2022-11-02 DIAGNOSIS — R09.82 POST-NASAL DRIP: ICD-10-CM

## 2022-11-02 DIAGNOSIS — J02.9 SORE THROAT: Primary | ICD-10-CM

## 2022-11-02 DIAGNOSIS — J34.3 NASAL TURBINATE HYPERTROPHY: ICD-10-CM

## 2022-11-02 LAB
DEPRECATED S PYO AG THROAT QL EIA: NEGATIVE
FLUAV AG SPEC QL IA: NEGATIVE
FLUBV AG SPEC QL IA: NEGATIVE
GROUP A STREP BY PCR: NOT DETECTED

## 2022-11-02 PROCEDURE — 99214 OFFICE O/P EST MOD 30 MIN: CPT | Mod: CS | Performed by: STUDENT IN AN ORGANIZED HEALTH CARE EDUCATION/TRAINING PROGRAM

## 2022-11-02 PROCEDURE — U0005 INFEC AGEN DETEC AMPLI PROBE: HCPCS | Performed by: STUDENT IN AN ORGANIZED HEALTH CARE EDUCATION/TRAINING PROGRAM

## 2022-11-02 PROCEDURE — 87804 INFLUENZA ASSAY W/OPTIC: CPT | Mod: 59 | Performed by: STUDENT IN AN ORGANIZED HEALTH CARE EDUCATION/TRAINING PROGRAM

## 2022-11-02 PROCEDURE — 87651 STREP A DNA AMP PROBE: CPT | Performed by: STUDENT IN AN ORGANIZED HEALTH CARE EDUCATION/TRAINING PROGRAM

## 2022-11-02 PROCEDURE — U0003 INFECTIOUS AGENT DETECTION BY NUCLEIC ACID (DNA OR RNA); SEVERE ACUTE RESPIRATORY SYNDROME CORONAVIRUS 2 (SARS-COV-2) (CORONAVIRUS DISEASE [COVID-19]), AMPLIFIED PROBE TECHNIQUE, MAKING USE OF HIGH THROUGHPUT TECHNOLOGIES AS DESCRIBED BY CMS-2020-01-R: HCPCS | Performed by: STUDENT IN AN ORGANIZED HEALTH CARE EDUCATION/TRAINING PROGRAM

## 2022-11-02 RX ORDER — MEDROXYPROGESTERONE ACETATE 2.5 MG/1
1 TABLET ORAL EVERY 24 HOURS
COMMUNITY
Start: 2022-03-01 | End: 2023-01-02

## 2022-11-02 RX ORDER — ESTRADIOL 0.5 MG/1
0.5 TABLET ORAL DAILY
COMMUNITY
Start: 2020-08-17

## 2022-11-02 RX ORDER — FLUTICASONE PROPIONATE 50 MCG
2 SPRAY, SUSPENSION (ML) NASAL DAILY
COMMUNITY
Start: 2022-05-10 | End: 2022-11-02

## 2022-11-02 RX ORDER — FLUTICASONE PROPIONATE 50 MCG
2 SPRAY, SUSPENSION (ML) NASAL DAILY
Qty: 16 G | Refills: 1 | Status: SHIPPED | OUTPATIENT
Start: 2022-11-02 | End: 2023-01-02 | Stop reason: SINTOL

## 2022-11-02 ASSESSMENT — PAIN SCALES - GENERAL: PAINLEVEL: NO PAIN (0)

## 2022-11-02 NOTE — PROGRESS NOTES
"  Assessment & Plan   Problem List Items Addressed This Visit    None  Visit Diagnoses     Sore throat    -  Primary    Relevant Orders    Influenza A & B Antigen - Clinic Collect    Symptomatic; Yes; 10/19/2022 COVID-19 Virus (Coronavirus) by PCR Nose    Streptococcus A Rapid Screen w/Reflex to PCR - Clinic Collect           On exam, patient appears comfortable.  She sounds a bit congested but no overt rhinorrhea or voice changes noted.  VSS  Physical exam consistent with postnasal drip, irritation of posterior oropharynx and nasal turbinate hypertrophy.  No lymphadenopathy.  No evidence of bacterial infection.  Plan:  - Patient most likely has a viral URI at this point slow to recover.  Throat irritation/stomach upset may be exacerbated by the PND.  - Advised patient to do Flonase twice daily x7 days, Tylenol as needed for fever as needed, Zyrtec and humidifier at bedside at night.  - Advised her to drink tea with honey to help coat the throat  - We will check COVID, flu and strep as she works at a  and its important for us to know if she is positive so that she can stay home.    -Patient was wondering if she might have a sinus infection requiring antibiotics.  Reviewed with patient that she may have some component of sinusitis but it is most likely viral as it has only been 2 weeks.  No indication for antibiotics at this point.      35 minutes spent on the date of the encounter doing chart review, history and exam, documentation and further activities per the note       BMI:   Estimated body mass index is 30.12 kg/m  as calculated from the following:    Height as of this encounter: 1.651 m (5' 5\").    Weight as of this encounter: 82.1 kg (181 lb).     No follow-ups on file.    DO CARMENZA Devi [facility]   Eboni Carter is a 62 year old, presenting for the following health issues:  Cough and Pharyngitis (Cough, sore throat,body aches x 2 weeks )      Patient is a " "62-year-old female with PMH of obesity, STREET, GERD, diverticulosis, HLD and vitamin D deficiency who presents today for sore throat with cough x2 weeks.    Patient works at a .  She thinks that is where she got it from.    That initially started as a cough and then about 4 days later, started to develop into a sore throat.  She has a lot of pressure around her eyes and some photosensitivity.  Her her ears have been itching.  She has no rhinorrhea.  She feels that her voice has become more hoarse.  Has not been checking her temperatures at home but does subjectively report fevers and chills.  Started to develop some GI discomfort today.  But also has reflux and is not sure if that is what is causing the stomach upset.  Checked for COVID over the weekend and it was negative.      Review of Systems   As per HPI      Objective    /78 (BP Location: Right arm, Patient Position: Sitting, Cuff Size: Adult Regular)   Pulse 85   Temp 98.4  F (36.9  C) (Oral)   Resp 18   Ht 1.651 m (5' 5\")   Wt 82.1 kg (181 lb)   SpO2 98%   BMI 30.12 kg/m    Body mass index is 30.12 kg/m .  Physical Exam    GENERAL: healthy, alert and no distress  EYES: Eyes grossly normal to inspection, PERRL and conjunctivae and sclerae normal  HENT:(+) mild fluid behind the TMs bilaterally without any evidence of AOM, (+) nasal turbinate hypertrophy without purulence, (+) PND with erythematous oropharynx, no obvious tonsillitis, mouth without ulcers or lesions  NECK: no adenopathy, no asymmetry, masses, or scars and thyroid normal to palpation  RESP: lungs clear to auscultation - no rales, rhonchi or wheezes  CV: regular rate and rhythm, normal S1 S2, no S3 or S4, no murmur, click or rub, no peripheral edema and peripheral pulses strong  ABDOMEN: soft, nontender, no hepatosplenomegaly, no masses and bowel sounds normal  MS: no gross musculoskeletal defects noted, no edema          "

## 2022-11-03 LAB — SARS-COV-2 RNA RESP QL NAA+PROBE: NEGATIVE

## 2023-01-02 ENCOUNTER — OFFICE VISIT (OUTPATIENT)
Dept: FAMILY MEDICINE | Facility: CLINIC | Age: 63
End: 2023-01-02
Payer: COMMERCIAL

## 2023-01-02 VITALS
SYSTOLIC BLOOD PRESSURE: 120 MMHG | RESPIRATION RATE: 12 BRPM | BODY MASS INDEX: 30.46 KG/M2 | HEIGHT: 65 IN | OXYGEN SATURATION: 97 % | TEMPERATURE: 99.5 F | WEIGHT: 182.8 LBS | DIASTOLIC BLOOD PRESSURE: 70 MMHG | HEART RATE: 66 BPM

## 2023-01-02 DIAGNOSIS — J01.00 ACUTE NON-RECURRENT MAXILLARY SINUSITIS: Primary | ICD-10-CM

## 2023-01-02 PROCEDURE — 99213 OFFICE O/P EST LOW 20 MIN: CPT | Performed by: FAMILY MEDICINE

## 2023-01-02 ASSESSMENT — PATIENT HEALTH QUESTIONNAIRE - PHQ9
10. IF YOU CHECKED OFF ANY PROBLEMS, HOW DIFFICULT HAVE THESE PROBLEMS MADE IT FOR YOU TO DO YOUR WORK, TAKE CARE OF THINGS AT HOME, OR GET ALONG WITH OTHER PEOPLE: SOMEWHAT DIFFICULT
SUM OF ALL RESPONSES TO PHQ QUESTIONS 1-9: 12
SUM OF ALL RESPONSES TO PHQ QUESTIONS 1-9: 12

## 2023-01-02 NOTE — PROGRESS NOTES
Problem List Items Addressed This Visit    None  Visit Diagnoses     Acute non-recurrent maxillary sinusitis    -  Primary    Given length of symptoms of the sinus pressure being over 10 days will move to treatment.  Side effects precautions discussed.  Will stop Flonase at this point.  Continue Ivy pot irrigation at least once a day if not twice.    Relevant Medications    amoxicillin-clavulanate (AUGMENTIN) 875-125 MG tablet         Yumiko Lyn is a 62 year old who presents for the following health issues     Chief Complaint   Patient presents with     Follow Up     Sinus Infection/Cough, 11/2/22 Visit      Continue sinus pressure.  Of note patient was seen back in November for a similar.  Since then the sinus pressure has continued as well as pain over the teeth.  Interestingly 8 days ago she actually developed fever and a sore throat as well as myalgias that lasted for approximately 3 days.  The myalgias have since cleared, the if fevers cleared after 24 hours, and the sore throat cleared after 48 hours.  Still felt very tired but her energy seems to be coming back.  The sinus pressure though has continued and was slightly worse 8 days ago but now that that intense portion cleared its back to what it was prior to that timeframe.    Since the November visit she has been using Flonase twice a day as well as a [place] pot twice a day.  She normally does the Ivy pot and then follows with Flonase.  Over the past 6 days though she has been getting bloody noses.    Of note her PHQ-9 was slightly elevated today.  However patient states it is situational from work, and some family matters but is improving.    History of Present Illness       Reason for visit:  Sinus infection  Symptom onset:  More than a month  Symptoms include:  Congestion/Cough  Symptom intensity:  Moderate  Symptom progression:  Staying the same  Had these symptoms before:  Yes  Has tried/received treatment for these symptoms:   "Yes  Previous treatment was successful:  No  What makes it worse:  N/A  What makes it better:  N/A    She eats 2-3 servings of fruits and vegetables daily.She consumes 0 sweetened beverage(s) daily.She exercises with enough effort to increase her heart rate 9 or less minutes per day.  She exercises with enough effort to increase her heart rate 3 or less days per week.   She is taking medications regularly.    Today's PHQ-9         PHQ-9 Total Score: 12    PHQ-9 Q9 Thoughts of better off dead/self-harm past 2 weeks :   Not at all    How difficult have these problems made it for you to do your work, take care of things at home, or get along with other people: Somewhat difficult         Review of Systems   All other systems reviewed and are negative.           Objective    /70 (BP Location: Left arm, Patient Position: Sitting, Cuff Size: Adult Large)   Pulse 66   Temp 99.5  F (37.5  C) (Oral)   Resp 12   Ht 1.651 m (5' 5\")   Wt 82.9 kg (182 lb 12.8 oz)   LMP  (LMP Unknown)   SpO2 97%   Breastfeeding No   BMI 30.42 kg/m    Body mass index is 30.42 kg/m .  Physical Exam  Vitals and nursing note reviewed.   Constitutional:       General: She is not in acute distress.     Appearance: Normal appearance. She is not ill-appearing.   HENT:      Head: Normocephalic and atraumatic.      Comments: Tender to pressure in the maxillary sinuses bilateral with the right worse than left.     Right Ear: Tympanic membrane, ear canal and external ear normal.      Left Ear: Tympanic membrane, ear canal and external ear normal.      Nose: Congestion and rhinorrhea present.      Mouth/Throat:      Pharynx: Oropharynx is clear. No oropharyngeal exudate or posterior oropharyngeal erythema.   Eyes:      Extraocular Movements: Extraocular movements intact.      Conjunctiva/sclera: Conjunctivae normal.   Cardiovascular:      Rate and Rhythm: Normal rate and regular rhythm.      Pulses: Normal pulses.      Heart sounds: Normal heart " sounds.   Pulmonary:      Effort: Pulmonary effort is normal.      Breath sounds: Normal breath sounds.   Musculoskeletal:      Cervical back: Normal range of motion.      Right lower leg: No edema.      Left lower leg: No edema.   Lymphadenopathy:      Cervical: Cervical adenopathy present.   Skin:     Capillary Refill: Capillary refill takes less than 2 seconds.   Neurological:      Mental Status: She is alert and oriented to person, place, and time.   Psychiatric:         Attention and Perception: Attention normal.         Mood and Affect: Mood normal.         Speech: Speech normal.         Thought Content: Thought content normal.                This note has been dictated using voice recognition software. Any grammatical or context distortions are unintentional and inherent to the software

## 2023-01-11 ENCOUNTER — MYC MEDICAL ADVICE (OUTPATIENT)
Dept: FAMILY MEDICINE | Facility: CLINIC | Age: 63
End: 2023-01-11

## 2023-01-11 DIAGNOSIS — J01.00 ACUTE NON-RECURRENT MAXILLARY SINUSITIS: Primary | ICD-10-CM

## 2023-01-12 RX ORDER — AZITHROMYCIN 250 MG/1
TABLET, FILM COATED ORAL
Qty: 6 TABLET | Refills: 0 | Status: SHIPPED | OUTPATIENT
Start: 2023-01-12 | End: 2023-01-17

## 2023-03-06 ENCOUNTER — TRANSFERRED RECORDS (OUTPATIENT)
Dept: HEALTH INFORMATION MANAGEMENT | Facility: CLINIC | Age: 63
End: 2023-03-06

## 2023-03-16 ENCOUNTER — OFFICE VISIT (OUTPATIENT)
Dept: FAMILY MEDICINE | Facility: CLINIC | Age: 63
End: 2023-03-16
Payer: COMMERCIAL

## 2023-03-16 VITALS
TEMPERATURE: 98.5 F | SYSTOLIC BLOOD PRESSURE: 148 MMHG | HEART RATE: 61 BPM | RESPIRATION RATE: 16 BRPM | HEIGHT: 64 IN | BODY MASS INDEX: 31.24 KG/M2 | WEIGHT: 183 LBS | DIASTOLIC BLOOD PRESSURE: 69 MMHG | OXYGEN SATURATION: 97 %

## 2023-03-16 DIAGNOSIS — R10.84 ABDOMINAL PAIN, GENERALIZED: ICD-10-CM

## 2023-03-16 DIAGNOSIS — G47.00 INSOMNIA, UNSPECIFIED TYPE: ICD-10-CM

## 2023-03-16 DIAGNOSIS — R53.83 OTHER FATIGUE: ICD-10-CM

## 2023-03-16 DIAGNOSIS — R35.0 INCREASED URINARY FREQUENCY: Primary | ICD-10-CM

## 2023-03-16 LAB
ALBUMIN SERPL BCG-MCNC: 4.2 G/DL (ref 3.5–5.2)
ALBUMIN UR-MCNC: NEGATIVE MG/DL
ALP SERPL-CCNC: 70 U/L (ref 35–104)
ALT SERPL W P-5'-P-CCNC: 17 U/L (ref 10–35)
ANION GAP SERPL CALCULATED.3IONS-SCNC: 12 MMOL/L (ref 7–15)
APPEARANCE UR: CLEAR
AST SERPL W P-5'-P-CCNC: 18 U/L (ref 10–35)
BILIRUB SERPL-MCNC: 1.5 MG/DL
BILIRUB UR QL STRIP: NEGATIVE
BUN SERPL-MCNC: 18.5 MG/DL (ref 8–23)
CALCIUM SERPL-MCNC: 9.3 MG/DL (ref 8.8–10.2)
CHLORIDE SERPL-SCNC: 104 MMOL/L (ref 98–107)
COLOR UR AUTO: YELLOW
CREAT SERPL-MCNC: 0.64 MG/DL (ref 0.51–0.95)
DEPRECATED HCO3 PLAS-SCNC: 22 MMOL/L (ref 22–29)
ERYTHROCYTE [DISTWIDTH] IN BLOOD BY AUTOMATED COUNT: 12.2 % (ref 10–15)
GFR SERPL CREATININE-BSD FRML MDRD: >90 ML/MIN/1.73M2
GLUCOSE SERPL-MCNC: 90 MG/DL (ref 70–99)
GLUCOSE UR STRIP-MCNC: NEGATIVE MG/DL
HBA1C MFR BLD: 5.5 % (ref 0–5.6)
HCT VFR BLD AUTO: 45.9 % (ref 35–47)
HGB BLD-MCNC: 16 G/DL (ref 11.7–15.7)
HGB UR QL STRIP: ABNORMAL
KETONES UR STRIP-MCNC: NEGATIVE MG/DL
LEUKOCYTE ESTERASE UR QL STRIP: NEGATIVE
MCH RBC QN AUTO: 30.3 PG (ref 26.5–33)
MCHC RBC AUTO-ENTMCNC: 34.9 G/DL (ref 31.5–36.5)
MCV RBC AUTO: 87 FL (ref 78–100)
NITRATE UR QL: NEGATIVE
PH UR STRIP: 6.5 [PH] (ref 5–8)
PLATELET # BLD AUTO: 291 10E3/UL (ref 150–450)
POTASSIUM SERPL-SCNC: 4 MMOL/L (ref 3.4–5.3)
PROT SERPL-MCNC: 7.4 G/DL (ref 6.4–8.3)
RBC # BLD AUTO: 5.28 10E6/UL (ref 3.8–5.2)
RBC #/AREA URNS AUTO: ABNORMAL /HPF
SODIUM SERPL-SCNC: 138 MMOL/L (ref 136–145)
SP GR UR STRIP: 1.01 (ref 1–1.03)
SQUAMOUS #/AREA URNS AUTO: ABNORMAL /LPF
TSH SERPL DL<=0.005 MIU/L-ACNC: 2.28 UIU/ML (ref 0.3–4.2)
UROBILINOGEN UR STRIP-ACNC: 0.2 E.U./DL
VIT B12 SERPL-MCNC: 379 PG/ML (ref 232–1245)
WBC # BLD AUTO: 10.8 10E3/UL (ref 4–11)
WBC #/AREA URNS AUTO: ABNORMAL /HPF

## 2023-03-16 PROCEDURE — 82607 VITAMIN B-12: CPT | Performed by: FAMILY MEDICINE

## 2023-03-16 PROCEDURE — 83036 HEMOGLOBIN GLYCOSYLATED A1C: CPT | Performed by: FAMILY MEDICINE

## 2023-03-16 PROCEDURE — 99214 OFFICE O/P EST MOD 30 MIN: CPT | Performed by: FAMILY MEDICINE

## 2023-03-16 PROCEDURE — 80053 COMPREHEN METABOLIC PANEL: CPT | Performed by: FAMILY MEDICINE

## 2023-03-16 PROCEDURE — 81001 URINALYSIS AUTO W/SCOPE: CPT | Performed by: FAMILY MEDICINE

## 2023-03-16 PROCEDURE — 82306 VITAMIN D 25 HYDROXY: CPT | Performed by: FAMILY MEDICINE

## 2023-03-16 PROCEDURE — 85027 COMPLETE CBC AUTOMATED: CPT | Performed by: FAMILY MEDICINE

## 2023-03-16 PROCEDURE — 36415 COLL VENOUS BLD VENIPUNCTURE: CPT | Performed by: FAMILY MEDICINE

## 2023-03-16 PROCEDURE — 87086 URINE CULTURE/COLONY COUNT: CPT | Performed by: FAMILY MEDICINE

## 2023-03-16 PROCEDURE — 84443 ASSAY THYROID STIM HORMONE: CPT | Performed by: FAMILY MEDICINE

## 2023-03-16 RX ORDER — TRAZODONE HYDROCHLORIDE 50 MG/1
25 TABLET, FILM COATED ORAL AT BEDTIME
Qty: 30 TABLET | Refills: 0 | Status: SHIPPED | OUTPATIENT
Start: 2023-03-16 | End: 2023-05-08

## 2023-03-16 ASSESSMENT — ENCOUNTER SYMPTOMS: FATIGUE: 1

## 2023-03-16 NOTE — PROGRESS NOTES
Assessment & Plan     Increased urinary frequency  Other fatigue  Abdominal pain, generalized  Pt appears well on examination today normal vital signs except mildly elevated blood pressure.  No focal findings.  We will do blood work today to rule out metabolic reason for her symptoms.  Could potentially be related to postmenopausal symptoms.  - Hemoglobin A1c  - Comprehensive metabolic panel (BMP + Alb, Alk Phos, ALT, AST, Total. Bili, TP)  - Vitamin D Deficiency  - CBC with platelets  - Vitamin B12  - TSH with free T4 reflex  - Urine Culture Aerobic Bacterial  - Hemoglobin A1c  - Comprehensive metabolic panel (BMP + Alb, Alk Phos, ALT, AST, Total. Bili, TP)  - Vitamin D Deficiency  - CBC with platelets  - Vitamin B12  - TSH with free T4 reflex    Insomnia, unspecified type  Associated with her hot flashes.  She is already on HRT prescribed by her OB/GYN.  Will trial trazodone 25 mg at nighttime.  She will follow-up with her primary care doctor in a couple months  - traZODone (DESYREL) 50 MG tablet  Dispense: 30 tablet; Refill: 0        Return in about 6 months (around 9/16/2023) for Routine preventive, in person, with PCP.    Ashley Wilde MD  [facility]    Yumiko Lyn is a 62 year old, presenting for the following health issues:  Fatigue (Has not been feeling well lately.  Fatigued, extremely hungry, increased urination.  Started a few months ago.  Tested BS and it 105 according to husbands gary.  )      Fatigue  Associated symptoms include fatigue.   History of Present Illness       Reason for visit:  Prediabetes  Symptom onset:  More than a month  Symptoms include:  Up at night  hungry not feeling well  Symptom intensity:  Moderate  Symptom progression:  Staying the same  Had these symptoms before:  No    She eats 2-3 servings of fruits and vegetables daily.She consumes 0 sweetened beverage(s) daily.She exercises with enough effort to increase her heart rate 10 to  "19 minutes per day.  She exercises with enough effort to increase her heart rate 3 or less days per week.   She is taking medications regularly.    Hasn't been sleeping well.  Daily stress but she denies feeling depressed.     Review of Systems   Constitutional: Positive for fatigue.          Objective    BP (!) 145/69   Pulse 66   Temp 98.5  F (36.9  C) (Oral)   Resp 16   Ht 1.632 m (5' 4.25\")   Wt 83 kg (183 lb)   LMP  (LMP Unknown)   SpO2 97%   BMI 31.17 kg/m    Body mass index is 31.17 kg/m .  Physical Exam   GENERAL: healthy, alert and no distress  NECK: no adenopathy, no asymmetry, masses, or scars and thyroid normal to palpation  RESP: lungs clear to auscultation - no rales, rhonchi or wheezes  CV: regular rate and rhythm, normal S1 S2, no S3 or S4, no murmur, click or rub  ABDOMEN: soft, nontender, no hepatosplenomegaly, no masses and bowel sounds normal  MS: no gross musculoskeletal defects noted, no edema  NEURO: Normal strength and tone, mentation intact and speech normal  PSYCH: mentation appears normal, affect normal/bright    Results for orders placed or performed in visit on 03/16/23 (from the past 24 hour(s))   UA Macro with Reflex to Micro and Culture - lab collect    Specimen: Urine, Clean Catch   Result Value Ref Range    Color Urine Yellow Colorless, Straw, Light Yellow, Yellow    Appearance Urine Clear Clear    Glucose Urine Negative Negative mg/dL    Bilirubin Urine Negative Negative    Ketones Urine Negative Negative mg/dL    Specific Gravity Urine 1.010 1.005 - 1.030    Blood Urine Trace (A) Negative    pH Urine 6.5 5.0 - 8.0    Protein Albumin Urine Negative Negative mg/dL    Urobilinogen Urine 0.2 0.2, 1.0 E.U./dL    Nitrite Urine Negative Negative    Leukocyte Esterase Urine Negative Negative   UA Microscopic with Reflex to Culture   Result Value Ref Range    RBC Urine 0-2 0-2 /HPF /HPF    WBC Urine 0-5 0-5 /HPF /HPF    Squamous Epithelials Urine Few (A) None Seen /LPF    Narrative "    Urine Culture not indicated   Hemoglobin A1c   Result Value Ref Range    Hemoglobin A1C 5.5 0.0 - 5.6 %   CBC with platelets   Result Value Ref Range    WBC Count 10.8 4.0 - 11.0 10e3/uL    RBC Count 5.28 (H) 3.80 - 5.20 10e6/uL    Hemoglobin 16.0 (H) 11.7 - 15.7 g/dL    Hematocrit 45.9 35.0 - 47.0 %    MCV 87 78 - 100 fL    MCH 30.3 26.5 - 33.0 pg    MCHC 34.9 31.5 - 36.5 g/dL    RDW 12.2 10.0 - 15.0 %    Platelet Count 291 150 - 450 10e3/uL

## 2023-03-17 LAB — DEPRECATED CALCIDIOL+CALCIFEROL SERPL-MC: 25 UG/L (ref 20–75)

## 2023-03-18 LAB — BACTERIA UR CULT: NORMAL

## 2023-04-30 ENCOUNTER — OFFICE VISIT (OUTPATIENT)
Dept: FAMILY MEDICINE | Facility: CLINIC | Age: 63
End: 2023-04-30
Payer: COMMERCIAL

## 2023-04-30 VITALS
BODY MASS INDEX: 30.12 KG/M2 | HEART RATE: 87 BPM | DIASTOLIC BLOOD PRESSURE: 81 MMHG | HEIGHT: 65 IN | OXYGEN SATURATION: 100 % | SYSTOLIC BLOOD PRESSURE: 163 MMHG | WEIGHT: 180.8 LBS | RESPIRATION RATE: 20 BRPM | TEMPERATURE: 99 F

## 2023-04-30 DIAGNOSIS — J06.9 VIRAL URI WITH COUGH: Primary | ICD-10-CM

## 2023-04-30 DIAGNOSIS — R07.0 THROAT PAIN: ICD-10-CM

## 2023-04-30 LAB
DEPRECATED S PYO AG THROAT QL EIA: NEGATIVE
GROUP A STREP BY PCR: NOT DETECTED
SARS-COV-2 RNA RESP QL NAA+PROBE: NEGATIVE

## 2023-04-30 PROCEDURE — U0003 INFECTIOUS AGENT DETECTION BY NUCLEIC ACID (DNA OR RNA); SEVERE ACUTE RESPIRATORY SYNDROME CORONAVIRUS 2 (SARS-COV-2) (CORONAVIRUS DISEASE [COVID-19]), AMPLIFIED PROBE TECHNIQUE, MAKING USE OF HIGH THROUGHPUT TECHNOLOGIES AS DESCRIBED BY CMS-2020-01-R: HCPCS | Performed by: NURSE PRACTITIONER

## 2023-04-30 PROCEDURE — 99213 OFFICE O/P EST LOW 20 MIN: CPT | Mod: CS | Performed by: NURSE PRACTITIONER

## 2023-04-30 PROCEDURE — 87651 STREP A DNA AMP PROBE: CPT | Performed by: NURSE PRACTITIONER

## 2023-04-30 PROCEDURE — U0005 INFEC AGEN DETEC AMPLI PROBE: HCPCS | Performed by: NURSE PRACTITIONER

## 2023-04-30 ASSESSMENT — ENCOUNTER SYMPTOMS
EYE PAIN: 0
SLEEP DISTURBANCE: 1
PHOTOPHOBIA: 0
EYE ITCHING: 0
TROUBLE SWALLOWING: 0
EYE DISCHARGE: 0
EYE REDNESS: 0
LIGHT-HEADEDNESS: 1

## 2023-04-30 NOTE — PATIENT INSTRUCTIONS
Rapid strep today was negative. Throat culture results pending. Will contact and treat if culture results are positive.     COVID test obtained in clinic today. Results pending.     Push fluids and get adequate rest.     You are experiencing common virus symptoms. Viruses take 1-2 weeks to resolve on average.      Try over-the-counter cough and cold medication as needed such as Robitussin, ibuprofen for discomfort.    Recheck if high fevers, shortness of breath or not better in about 1 week re: fevers over 100.4 or 2 weeks overall.      COVID test.  Your results will come to MyChart tomorrow.

## 2023-04-30 NOTE — PROGRESS NOTES
Assessment & Plan     Throat pain    - Streptococcus A Rapid Screen w/Reflex to PCR - Clinic Collect  - Group A Streptococcus PCR Throat Swab    Viral URI with cough    - Symptomatic COVID-19 Virus (Coronavirus) by PCR Nose       Patient presents today with sore throat, congestion, fever, and malaise for the past 2 days. Patient works at a  and states many children are out with strep.   Rapid strep obtained in clinic today was negative. Throat culture obtained; results pending at this time. Plan to contact and treat if culture results are positive.     Push fluids and get adequate rest.     Advised the following:     You are experiencing common virus symptoms. Viruses take 1-2 weeks to resolve on average.      Try over-the-counter cough and cold medication as needed such as Robitussin, ibuprofen for discomfort.    Recheck if high fevers, shortness of breath or not better in about 1 week re: fevers over 100.4 or 2 weeks overall.              Return in about 1 week (around 5/7/2023) for If no better.    Anuja Weldon [facility]    Yumiko Lyn is a 62 year old female who presents to clinic today for the following health issues:  Chief Complaint   Patient presents with     Pharyngitis     Was exposed to strep at her work having a headache, sore throat, fever      HPI    Patient presents today with sore throat, congestion, fever, and malaise for the past 2 days. Sore throat currently rated 5/10. Throat pain reported to be better today than yesterday.  Fever up to 100.5F yesterday. Has not checked temp at home this morning. Low-grade fever of 99F in clinic today.    Intermittent dry cough reported. Denies SOB and wheezing.     Woke x2 last night with headache that rated 7-8/10. Reports taking Excidrin this morning at 7:30 am which was effective in alleviating headache.       Appetite decreased. Reports acid reflux and nausea yesterday. 1 episode of emesis yesterday, but  "none since. Denies current abdominal pain.     Seen on 1/2/23 for maxillary sinusitis. Treated with Augmentin. Symptoms completely resolved with antibiotics. Reports maxillary sinus pressure that rates 4/10.       Denies seasonal allergies.      Works at . Many kids are out sick with strep.             Review of Systems   HENT: Negative for trouble swallowing.    Eyes: Negative for photophobia, pain, discharge, redness, itching and visual disturbance.   Neurological: Positive for light-headedness.   Psychiatric/Behavioral: Positive for sleep disturbance. Suicidal ideas: due to sore throat            Objective    BP (!) 163/81 (BP Location: Right arm, Patient Position: Sitting, Cuff Size: Adult Regular)   Pulse 87   Temp 99  F (37.2  C) (Oral)   Resp 20   Ht 1.651 m (5' 5\")   Wt 82 kg (180 lb 12.8 oz)   LMP  (LMP Unknown)   SpO2 100%   BMI 30.09 kg/m    Physical Exam  Constitutional:       General: She is awake. She is not in acute distress.     Appearance: Normal appearance. She is well-developed and well-groomed. She is not ill-appearing or toxic-appearing.   HENT:      Head: Normocephalic and atraumatic.      Nose: No congestion.      Right Sinus: Maxillary sinus tenderness present. No frontal sinus tenderness.      Left Sinus: Maxillary sinus tenderness present. No frontal sinus tenderness.      Mouth/Throat:      Lips: Pink.      Mouth: Mucous membranes are moist.      Pharynx: Uvula midline. Posterior oropharyngeal erythema present.      Tonsils: No tonsillar exudate. 1+ on the right. 1+ on the left.   Eyes:      General: Lids are normal. No allergic shiner.        Right eye: No discharge or hordeolum.         Left eye: No discharge or hordeolum.      Extraocular Movements: Extraocular movements intact.      Conjunctiva/sclera: Conjunctivae normal.      Pupils: Pupils are equal, round, and reactive to light.   Cardiovascular:      Rate and Rhythm: Normal rate and regular rhythm.      Heart " sounds: Normal heart sounds, S1 normal and S2 normal. No murmur heard.  Pulmonary:      Effort: Pulmonary effort is normal. No respiratory distress.      Breath sounds: Normal breath sounds.   Musculoskeletal:      Cervical back: Full passive range of motion without pain.   Lymphadenopathy:      Cervical: Cervical adenopathy present.   Neurological:      General: No focal deficit present.      Mental Status: She is alert and oriented to person, place, and time.      Sensory: Sensation is intact.      Motor: Motor function is intact.      Coordination: Coordination is intact.      Gait: Gait is intact.   Psychiatric:         Attention and Perception: Attention and perception normal.         Mood and Affect: Mood and affect normal.         Speech: Speech normal.         Behavior: Behavior normal. Behavior is cooperative.         Thought Content: Thought content normal.         Cognition and Memory: Cognition normal.         Judgment: Judgment normal.            Results for orders placed or performed in visit on 04/30/23 (from the past 24 hour(s))   Streptococcus A Rapid Screen w/Reflex to PCR - Clinic Collect    Specimen: Throat; Swab   Result Value Ref Range    Group A Strep antigen Negative Negative

## 2023-05-04 ENCOUNTER — TRANSFERRED RECORDS (OUTPATIENT)
Dept: HEALTH INFORMATION MANAGEMENT | Facility: CLINIC | Age: 63
End: 2023-05-04
Payer: COMMERCIAL

## 2023-05-07 DIAGNOSIS — G47.00 INSOMNIA, UNSPECIFIED TYPE: ICD-10-CM

## 2023-05-07 NOTE — TELEPHONE ENCOUNTER
"Routing to Provider for review and approval.    Last Written Prescription Date:  3/16/23  Last Fill Quantity: 30,  # refills: 0   Last office visit provider:  4/30/23     Requested Prescriptions   Pending Prescriptions Disp Refills     traZODone (DESYREL) 50 MG tablet [Pharmacy Med Name: TRAZODONE 50 MG TABLET] 30 tablet 0     Sig: TAKE 1/2 TABLET BY MOUTH AT BEDTIME       Serotonin Modulators Passed - 5/7/2023  1:59 PM        Passed - Recent (12 mo) or future (30 days) visit within the authorizing provider's specialty     Patient has had an office visit with the authorizing provider or a provider within the authorizing providers department within the previous 12 mos or has a future within next 30 days. See \"Patient Info\" tab in inbasket, or \"Choose Columns\" in Meds & Orders section of the refill encounter.              Passed - Medication is active on med list        Passed - Patient is age 18 or older        Passed - No active pregnancy on record        Passed - No positive pregnancy test in past 12 months             Ligia Greer RN 05/07/23 2:00 PM  "

## 2023-05-08 RX ORDER — TRAZODONE HYDROCHLORIDE 50 MG/1
TABLET, FILM COATED ORAL
Qty: 30 TABLET | Refills: 0 | Status: SHIPPED | OUTPATIENT
Start: 2023-05-08 | End: 2023-07-03

## 2023-05-15 ENCOUNTER — OFFICE VISIT (OUTPATIENT)
Dept: FAMILY MEDICINE | Facility: CLINIC | Age: 63
End: 2023-05-15
Payer: COMMERCIAL

## 2023-05-15 ENCOUNTER — NURSE TRIAGE (OUTPATIENT)
Dept: NURSING | Facility: CLINIC | Age: 63
End: 2023-05-15
Payer: COMMERCIAL

## 2023-05-15 VITALS
HEART RATE: 84 BPM | RESPIRATION RATE: 16 BRPM | SYSTOLIC BLOOD PRESSURE: 147 MMHG | BODY MASS INDEX: 29.79 KG/M2 | DIASTOLIC BLOOD PRESSURE: 81 MMHG | OXYGEN SATURATION: 99 % | WEIGHT: 179 LBS | TEMPERATURE: 98.8 F

## 2023-05-15 DIAGNOSIS — J01.90 ACUTE SINUSITIS WITH SYMPTOMS > 10 DAYS: Primary | ICD-10-CM

## 2023-05-15 PROCEDURE — 99213 OFFICE O/P EST LOW 20 MIN: CPT | Performed by: PREVENTIVE MEDICINE

## 2023-05-15 NOTE — TELEPHONE ENCOUNTER
Patient has bad sinus pressure in face and back of head and temp 100.4 this weekend.  Napping x2 on Saturday.  Body aches, headache., congestion and green and yellow mucous.  Patient has tested for strep and covid and was negative.  Patient is currently at work and states that she will go to urgent care after work today.      Reason for Disposition    SEVERE sinus pain    Additional Information    Negative: Sounds like a life-threatening emergency to the triager    Negative: Difficulty breathing, and not from stuffy nose (e.g., not relieved by cleaning out the nose)    Negative: SEVERE headache and has fever    Negative: Patient sounds very sick or weak to the triager    Protocols used: SINUS PAIN OR CONGESTION-A-OH

## 2023-05-16 ENCOUNTER — TELEPHONE (OUTPATIENT)
Dept: FAMILY MEDICINE | Facility: CLINIC | Age: 63
End: 2023-05-16
Payer: COMMERCIAL

## 2023-05-16 DIAGNOSIS — J01.90 ACUTE NON-RECURRENT SINUSITIS, UNSPECIFIED LOCATION: Primary | ICD-10-CM

## 2023-05-16 RX ORDER — DOXYCYCLINE 100 MG/1
100 TABLET ORAL 2 TIMES DAILY
Qty: 14 TABLET | Refills: 0 | Status: SHIPPED | OUTPATIENT
Start: 2023-05-16 | End: 2023-05-23

## 2023-05-16 NOTE — PROGRESS NOTES
Assessment & Plan     1. Acute sinusitis with symptoms > 10 days    Augmentin for 7 days  Netipot    Follow up if not improving in 10-14 days or sooner as needed.        No follow-ups on file.    Shaw Darling MD  [facility]    Subjective     Katarina Carter is a 62 year old year old female who presents to clinic today for the following health issues:    Patient presents with:  Sinus Problem: Has been sick for about 2 weeks, recent sinus flare up, aching all over, fatigue,  headaches, pressure under eye.     This is a 61 yo female who presents with congestion, post nasal drip and cough for 2 weeks.  Also pain above and below eyes and headache.  Hx of sinus infections and this feels like one.  No rash, cp, sob.    Patient Active Problem List   Diagnosis     Essential Hypercholesterolemia     Vaginal Itching     Anemia     Vitamin D Deficiency     Fatigue     Myalgia     Fibromyalgia       Current Outpatient Medications   Medication     estradiol (ESTRACE) 0.5 MG tablet     famotidine (PEPCID) 10 MG tablet     medroxyPROGESTERone (PROVERA) 2.5 MG tablet     Melatonin 1 MG CHEW     doxycycline monohydrate (ADOXA) 100 MG tablet     traZODone (DESYREL) 50 MG tablet     No current facility-administered medications for this visit.       No past medical history on file.    Social History   reports that she has never smoked. She has never been exposed to tobacco smoke. She has never used smokeless tobacco. She reports that she does not drink alcohol and does not use drugs.    Family History   Problem Relation Age of Onset     Breast Cancer Paternal Aunt      Sleep Apnea Brother      Snoring Brother      Sleep Apnea Brother      Snoring Brother      Celiac Disease Daughter 20.00     Hypertension Father      Cerebrovascular Disease Father 77.00     Cancer Mother        Review of Systems  Constitutional, HEENT, cardiovascular, pulmonary, GI, , musculoskeletal, neuro, skin, endocrine and  psych systems are negative, except as otherwise noted.      Objective    BP (!) 147/81 (BP Location: Right arm, Patient Position: Sitting, Cuff Size: Adult Regular)   Pulse 84   Temp 98.8  F (37.1  C) (Oral)   Resp 16   Wt 81.2 kg (179 lb)   LMP  (LMP Unknown)   SpO2 99%   BMI 29.79 kg/m    Physical Exam   GENERAL: healthy, alert and no distress  EYES: Eyes grossly normal to inspection, PERRL and conjunctivae and sclerae normal  HENT: ear canals and TM's normal, nose and mouth without ulcers or lesions  NECK: no adenopathy, no asymmetry, masses, or scars and thyroid normal to palpation  RESP: lungs clear to auscultation - no rales, rhonchi or wheezes  CV: regular rate and rhythm, normal S1 S2, no S3 or S4, no murmur, click or rub, no peripheral edema and peripheral pulses strong  ABDOMEN: soft, nontender, no hepatosplenomegaly, no masses and bowel sounds normal  MS: no gross musculoskeletal defects noted, no edema  SKIN: no suspicious lesions or rashes  NEURO: Normal strength and tone, mentation intact and speech normal  PSYCH: mentation appears normal, affect normal/bright

## 2023-05-16 NOTE — TELEPHONE ENCOUNTER
New Medication Request    Contacts       Type Contact Phone/Fax    05/16/2023 08:35 AM CDT Phone (Incoming) Eboni Carter (Self) [phone] (M)          What medication are you requesting?: looking for RX other than AUGMENTIN    Reason for medication request: Per patient AUGMENTIN does not work for her, she had an issue in January and had to be prescribed something else.    Frustrated that she picked up RX and that she forgot to tell Dr. Darling of this issue yesterday while at [facility].    Controlled Substance Agreement on file:   CSA -- Patient Level:    CSA: None found at the patient level.         Patient offered an appointment? No Patient was seen at [facility] yesterdya 05/15/2023    Preferred Pharmacy:    [facility] [id number] [address] [address]  Phone: [phone] Fax: [phone]    Could we send this information to you in [facility] or would you prefer to receive a phone call?:   Patient would like to be contacted via MyChart or phone call which ever is easiest.

## 2023-05-16 NOTE — TELEPHONE ENCOUNTER
Called and informed patient alterative Rx has been sent to pharmacy. Patient states will  Rx as soon as she can. No questions.      Kel Randall MA on 5/16/2023 at 10:48 AM

## 2023-05-17 ENCOUNTER — NURSE TRIAGE (OUTPATIENT)
Dept: NURSING | Facility: CLINIC | Age: 63
End: 2023-05-17

## 2023-05-17 ENCOUNTER — OFFICE VISIT (OUTPATIENT)
Dept: FAMILY MEDICINE | Facility: CLINIC | Age: 63
End: 2023-05-17
Payer: COMMERCIAL

## 2023-05-17 VITALS
WEIGHT: 180.6 LBS | BODY MASS INDEX: 30.09 KG/M2 | RESPIRATION RATE: 16 BRPM | DIASTOLIC BLOOD PRESSURE: 73 MMHG | SYSTOLIC BLOOD PRESSURE: 145 MMHG | TEMPERATURE: 99.2 F | OXYGEN SATURATION: 95 % | HEIGHT: 65 IN | HEART RATE: 98 BPM

## 2023-05-17 DIAGNOSIS — J01.01 ACUTE RECURRENT MAXILLARY SINUSITIS: Primary | ICD-10-CM

## 2023-05-17 PROCEDURE — 99213 OFFICE O/P EST LOW 20 MIN: CPT | Performed by: FAMILY MEDICINE

## 2023-05-17 NOTE — TELEPHONE ENCOUNTER
Nurse Triage SBAR    Is this a 2nd Level Triage? YES, LICENSED PRACTITIONER REVIEW IS REQUIRED    Situation: Sinus infection    Background: patient calling, was put on Doxycycline yesterday for a sinus infection yesterday and has taken 3 doses. She calls today because she is not feeling better. States she has had temp of 101. Temp at the time of call was 99.8 without Tylenol or Ibuprofen.  States that she feels achy all over.  She denies difficulty breathing, denies chest pain. States she had to miss work today and she is concerned that she is not getting better.     Assessment: Sinus infection on antibiotics    Protocol Recommended Disposition:   See in Office Today or Tomorrow    Recommendation:     Patient wants to be seen in the clinic today despite reassurance that it may take a few days of antibiotics to start to feel better. Please contact her with any further recommendations.      Routed to provider     CAESAR WILLIAMSON RN      Does the patient meet one of the following criteria for ADS visit consideration? 16+ years old, with an [facility] PCP     TIP  Providers, please consider if this condition is appropriate for management at one of our Acute and Diagnostic Services sites.     If patient is a good candidate, please use dotphrase <dot>triageresponse and select Refer to ADS to document.      Reason for Disposition    Patient wants to be seen    Additional Information    Negative: SEVERE difficulty breathing (e.g., struggling for each breath, speaks in single words)    Negative: Sounds like a life-threatening emergency to the triager    Negative: Difficulty breathing and not from stuffy nose (e.g., not relieved by cleaning out the nose)    Negative: SEVERE headache and fever    Negative: Taking antibiotic > 24 hours and fever > 103 F (39.4 C)    Negative: Redness or swelling on the cheek, forehead or around the eye and fever    Negative: Patient sounds very sick or weak to the triager    Negative: SEVERE  sinus pain and not improved 2 hours after pain medicine    Negative: Redness or swelling on the cheek, forehead or around the eye and new since starting antibiotics    Negative: Taking antibiotic > 48 hours (2 days) and fever persists    Negative: Taking antibiotic > 72 hours (3 days) and sinus pain not improved    Protocols used: SINUS INFECTION ON ANTIBIOTIC FOLLOW-UP CALL-A-OH

## 2023-05-17 NOTE — PROGRESS NOTES
"    Yumiko Lyn is a 62 year old, presenting for the following health issues:  Sinus Problem (Sinus infection, pt was seen at the walk in clinic twice now. Still having ongoing fevers. Missing work. Pt has been using OTC stuff, pt is currently on antibiotic as well . )      Visit 4-30-23 to the [facility] due to onset of a sore throat the Friday before.  Neg strep and Covid testing.  Then sore throat on 5-12-23, fever on 5-13-23.  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  The 5-15-23 went to the [facility] and was put on doxycycline.  Fever 101 yesterday at 5 pm.  Her whole body is achy.    Was on Augmentin 1-2-23 and then Zithromax on 1-12-23  The whole head feels full.          5/17/2023    10:32 AM   Additional Questions   Roomed by Teodora Forrester CMA   Accompanied by N/A     Sinus Problem     History of Present Illness       Reason for visit:  Sinus infection    She eats 2-3 servings of fruits and vegetables daily.She consumes 0 sweetened beverage(s) daily.She exercises with enough effort to increase her heart rate 9 or less minutes per day.  She exercises with enough effort to increase her heart rate 3 or less days per week. She is missing 7 dose(s) of medications per week.             Objective    BP (!) 145/73 (BP Location: Left arm, Patient Position: Sitting, Cuff Size: Adult Large)   Pulse 98   Temp 99.2  F (37.3  C) (Oral)   Resp 16   Ht 1.651 m (5' 5\")   Wt 81.9 kg (180 lb 9.6 oz)   LMP  (LMP Unknown)   SpO2 95%   BMI 30.05 kg/m    Body mass index is 30.05 kg/m .  Physical Exam   GENERAL: healthy, alert and no distress  HENT: ear canals and TM's normal, nose and mouth without ulcers or lesions,  No discrete sinus tx to percussion  NECK: no adenopathy, no asymmetry, masses, or scars and thyroid normal to palpation  RESP: lungs clear to auscultation - no rales, rhonchi or wheezes  CV: regular rate and rhythm, normal S1 S2, no S3 or S4, no murmur, click or rub, no peripheral edema and peripheral " pulses strong    Encounter Diagnosis   Name Primary?     Acute recurrent maxillary sinusitis Yes          PLAN:   Continue your nasal steroid spray    Referral to ENT due to recurrent sinus infections    Switch back to Augmentin twice daily as initially prescribed.    CT of the sinuses without contrast

## 2023-05-17 NOTE — PATIENT INSTRUCTIONS
Continue your nasal steroid spray    Referral to ENT due to recurrent sinus infections    Switch back to Augmentin twice daily as initially prescribed.    CT of the sinuses without contrast

## 2023-05-17 NOTE — PROGRESS NOTES
ENT Consultation    Katarina Carter who is a 62 year old female seen in consultation at the request of Silvano Conn.      History of Present Illness - Katarina Carter is a 62 year old female presents with chief complaint of possible sinus infection.  At the end of April she started with a severe pharyngitis was exposed to strep in school but that was tested strep negative.  She also states for COVID and was negative.  Usually after approximate infection will follow with  sinus infection.  That would usually respond to antibiotics.  So she went to a walk-in clinic and received a course of amoxicillin.  After a few days when she was not getting better she was switched to doxycycline.  However that did not seem to be helping.  She was recently switched to Augmentin few days ago.  The symptoms were sore throat bitemporal headaches and pressure frontal headaches no fevers low-grade.  She suffers from nasal congestion still greenish drainage.  There is slight decrease in smell and taste but not significant.      Body mass index is 30.01 kg/m .    Weight management plan: Patient was referred to their PCP to discuss a diet and exercise plan.    BP Readings from Last 1 Encounters:   05/18/23 132/84       BP noted to be well controlled today in office.     Katarina IS NOT a smoker/uses chewing tobacco.        Past Medical History - History reviewed. No pertinent past medical history.    Current Medications -   Current Outpatient Medications:      amoxicillin-clavulanate (AUGMENTIN) 875-125 MG tablet, Take 1 tablet by mouth 2 times daily, Disp: , Rfl:      cyclobenzaprine (FLEXERIL) 5 MG tablet, Take 5 mg by mouth every 8 hours as needed, Disp: , Rfl:      estradiol (ESTRACE) 0.5 MG tablet, Take 0.5 mg by mouth, Disp: , Rfl:      famotidine (PEPCID) 10 MG tablet, Take 10 mg by mouth At Bedtime, Disp: , Rfl:      medroxyPROGESTERone (PROVERA) 2.5 MG tablet, [MEDROXYPROGESTERONE (PROVERA) 2.5 MG TABLET] TAKE 1 TABLET BY  MOUTH DAILY FOR 90 DAYS, Disp: , Rfl: 0     Melatonin 1 MG CHEW, Take 2 mg by mouth, Disp: , Rfl:      doxycycline monohydrate (ADOXA) 100 MG tablet, Take 1 tablet (100 mg) by mouth 2 times daily for 7 days (Patient not taking: Reported on 5/18/2023), Disp: 14 tablet, Rfl: 0     traZODone (DESYREL) 50 MG tablet, TAKE 1/2 TABLET BY MOUTH AT BEDTIME (Patient not taking: Reported on 5/15/2023), Disp: 30 tablet, Rfl: 0    Allergies -   Allergies   Allergen Reactions     Citalopram Unknown     Escitalopram Unknown       Social History -   Social History     Socioeconomic History     Marital status:    Tobacco Use     Smoking status: Never     Passive exposure: Never     Smokeless tobacco: Never   Vaping Use     Vaping status: Never Used     Passive vaping exposure: Yes   Substance and Sexual Activity     Alcohol use: No     Drug use: Never     Sexual activity: Not Currently     Partners: Male   Social History Narrative    Patient is  and has 3 children age 31,24,21  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  He did not go to college.  Medical daughter Jaclyn is in 3 M  Older daughter Kaycee Adkins     Coagulant  for ronani jeanna Sanchez MD  3/11/2021                The 10-year ASCVD risk score ([place] ITALO Jr., et al., 2013) is: 3.9%      Values used to calculate the score:        Age: 60 years        Sex: Female        Is Non- : No        Diabetic: No        Tobacco smoker: No        Systolic Blood Pressure: 128 mmHg        Is BP treated: No        HDL Cholesterol: 51 mg/dL        Total Cholesterol: 236 mg/dL       Family History -   Family History   Problem Relation Age of Onset     Breast Cancer Paternal Aunt      Sleep Apnea Brother      Snoring Brother      Sleep Apnea Brother      Snoring Brother      Celiac Disease Daughter 20.00     Hypertension Father      Cerebrovascular Disease Father 77.00     Cancer Mother        Review of  "Systems - As per HPI and PMHx, otherwise review of system review of the head and neck negative. Otherwise 10+ review of system is negative    Physical Exam  /84 (BP Location: Right arm, Patient Position: Sitting, Cuff Size: Adult Regular)   Temp 97.9  F (36.6  C) (Temporal)   Ht 1.651 m (5' 5\")   Wt 81.8 kg (180 lb 5 oz)   LMP  (LMP Unknown)   BMI 30.01 kg/m    BMI: Body mass index is 30.01 kg/m .    General - The patient is well nourished and well developed, and appears to have good nutritional status.  Alert and oriented to person and place, answers questions and cooperates with examination appropriately.    SKIN - No suspicious lesions or rashes.  Respiration - No respiratory distress.  Head and Face - Normocephalic and atraumatic, with no gross asymmetry noted of the contour of the facial features.  The facial nerve is intact, with strong symmetric movements.    Voice and Breathing - The patient was breathing comfortably without the use of accessory muscles. The patients voice was clear and strong, and had appropriate pitch and quality.    Ears - Bilateral pinna and EACs with normal appearing overlying skin. Tympanic membrane intact with good mobility on pneumatic otoscopy bilaterally. Bony landmarks of the ossicular chain are normal. The tympanic membranes are normal in appearance. No retraction, perforation, or masses.  No fluid or purulence was seen in the external canal or the middle ear.     Eyes - Extraocular movements intact.  Sclera were not icteric or injected, conjunctiva were pink and moist.    Mouth - Examination of the oral cavity showed pink, healthy oral mucosa. No lesions or ulcerations noted.  The tongue was mobile and midline, and the dentition were in good condition.      Throat - The walls of the oropharynx were smooth, pink, moist, symmetric, and had no lesions or ulcerations.  Posteriorly some clear secretions were noted with some erythema involving posterior pharyngeal mucosa " the tonsillar pillars and soft palate were symmetric.  The uvula was midline on elevation.    Neck - Normal midline excursion of the laryngotracheal complex during swallowing.  Full range of motion on passive movement.  Palpation of the occipital, submental, submandibular, internal jugular chain, and supraclavicular nodes did not demonstrate any abnormal lymph nodes or masses.  The carotid pulse was palpable bilaterally.  Palpation of the thyroid was soft and smooth, with no nodules or goiter appreciated.  The trachea was mobile and midline.    Nose - External contour is symmetric, no gross deflection or scars.  Nasal mucosa is erythematous and moist with no abnormal mucus.  The septum was midline and non-obstructive, turbinates of normal size and position.  No polyps, masses, or purulence noted on examination.    Neuro - Nonfocal neuro exam is normal, CN 2 through 12 intact, normal gait and muscle tone.      Performed in clinic today:  No procedures preformed in clinic today      A/P - Katarina Carter is a 62 year old female with what appears to be acute sinusitis secondary to upper respiratory infection.  Patient is currently on Augmentin 875 twice daily.  She was given a 7-day course would like to extend it to 10-day course.  Patient should also start using fluticasone daily and nasal saline.  If symptoms continue next couple weeks she will be back otherwise see me back as needed.      Art Landon MD

## 2023-05-17 NOTE — TELEPHONE ENCOUNTER
Attempted to call patient, left message for return call to clinic. We do have some approval required holds this morning with Dr. Conn that we could use if she is wanting to be seen again.    Julianne Rivera RN

## 2023-05-18 ENCOUNTER — OFFICE VISIT (OUTPATIENT)
Dept: OTOLARYNGOLOGY | Facility: CLINIC | Age: 63
End: 2023-05-18
Attending: FAMILY MEDICINE
Payer: COMMERCIAL

## 2023-05-18 ENCOUNTER — TELEPHONE (OUTPATIENT)
Dept: SLEEP MEDICINE | Facility: CLINIC | Age: 63
End: 2023-05-18

## 2023-05-18 ENCOUNTER — PREP FOR PROCEDURE (OUTPATIENT)
Dept: SLEEP MEDICINE | Facility: CLINIC | Age: 63
End: 2023-05-18

## 2023-05-18 VITALS
WEIGHT: 180.31 LBS | BODY MASS INDEX: 30.04 KG/M2 | HEIGHT: 65 IN | DIASTOLIC BLOOD PRESSURE: 84 MMHG | SYSTOLIC BLOOD PRESSURE: 132 MMHG | TEMPERATURE: 97.9 F

## 2023-05-18 DIAGNOSIS — J01.01 ACUTE RECURRENT MAXILLARY SINUSITIS: ICD-10-CM

## 2023-05-18 DIAGNOSIS — G47.30 SLEEP-DISORDERED BREATHING: ICD-10-CM

## 2023-05-18 DIAGNOSIS — J01.90 ACUTE SINUSITIS TREATED WITH ANTIBIOTICS IN THE PAST 60 DAYS: Primary | ICD-10-CM

## 2023-05-18 DIAGNOSIS — J35.3 ADENOTONSILLAR HYPERTROPHY: ICD-10-CM

## 2023-05-18 DIAGNOSIS — J03.01 RECURRENT STREPTOCOCCAL TONSILLITIS: Primary | ICD-10-CM

## 2023-05-18 PROCEDURE — 99203 OFFICE O/P NEW LOW 30 MIN: CPT | Performed by: OTOLARYNGOLOGY

## 2023-05-18 RX ORDER — CYCLOBENZAPRINE HCL 5 MG
5 TABLET ORAL EVERY 8 HOURS PRN
COMMUNITY
Start: 2023-05-04 | End: 2023-12-06

## 2023-05-18 ASSESSMENT — PAIN SCALES - GENERAL: PAINLEVEL: MODERATE PAIN (4)

## 2023-05-18 NOTE — LETTER
5/18/2023         RE: Katarina Carter  [address] [facility] ([place]) [address]        Dear Colleague,    Thank you for referring your patient, Katarina Carter, to the [facility]. Please see a copy of my visit note below.    ENT Consultation    Katarina Carter who is a 62 year old female seen in consultation at the request of Silvano Conn.      History of Present Illness - Katarina Carter is a 62 year old female presents with chief complaint of possible sinus infection.  At the end of April she started with a severe pharyngitis was exposed to strep in school but that was tested strep negative.  She also states for COVID and was negative.  Usually after approximate infection will follow with  sinus infection.  That would usually respond to antibiotics.  So she went to a walk-in clinic and received a course of amoxicillin.  After a few days when she was not getting better she was switched to doxycycline.  However that did not seem to be helping.  She was recently switched to Augmentin few days ago.  The symptoms were sore throat bitemporal headaches and pressure frontal headaches no fevers low-grade.  She suffers from nasal congestion still greenish drainage.  There is slight decrease in smell and taste but not significant.      Body mass index is 30.01 kg/m .    Weight management plan: Patient was referred to their PCP to discuss a diet and exercise plan.    BP Readings from Last 1 Encounters:   05/18/23 132/84       BP noted to be well controlled today in office.     Katarina IS NOT a smoker/uses chewing tobacco.        Past Medical History - History reviewed. No pertinent past medical history.    Current Medications -   Current Outpatient Medications:      amoxicillin-clavulanate (AUGMENTIN) 875-125 MG tablet, Take 1 tablet by mouth 2 times daily, Disp: , Rfl:      cyclobenzaprine (FLEXERIL) 5 MG tablet, Take 5 mg by mouth every 8 hours as needed, Disp: , Rfl:      estradiol (ESTRACE)  0.5 MG tablet, Take 0.5 mg by mouth, Disp: , Rfl:      famotidine (PEPCID) 10 MG tablet, Take 10 mg by mouth At Bedtime, Disp: , Rfl:      medroxyPROGESTERone (PROVERA) 2.5 MG tablet, [MEDROXYPROGESTERONE (PROVERA) 2.5 MG TABLET] TAKE 1 TABLET BY MOUTH DAILY FOR 90 DAYS, Disp: , Rfl: 0     Melatonin 1 MG CHEW, Take 2 mg by mouth, Disp: , Rfl:      doxycycline monohydrate (ADOXA) 100 MG tablet, Take 1 tablet (100 mg) by mouth 2 times daily for 7 days (Patient not taking: Reported on 5/18/2023), Disp: 14 tablet, Rfl: 0     traZODone (DESYREL) 50 MG tablet, TAKE 1/2 TABLET BY MOUTH AT BEDTIME (Patient not taking: Reported on 5/15/2023), Disp: 30 tablet, Rfl: 0    Allergies -   Allergies   Allergen Reactions     Citalopram Unknown     Escitalopram Unknown       Social History -   Social History     Socioeconomic History     Marital status:    Tobacco Use     Smoking status: Never     Passive exposure: Never     Smokeless tobacco: Never   Vaping Use     Vaping status: Never Used     Passive vaping exposure: Yes   Substance and Sexual Activity     Alcohol use: No     Drug use: Never     Sexual activity: Not Currently     Partners: Male   Social History Narrative    Patient is  and has 3 children age 31,24,21  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  He did not go to college.  Medical daughter Jaclyn is in 3 M  Older daughter Kaycee Adkins     Coagulant  for hemophi jeanna   Stephen Sanchez MD  3/11/2021                The 10-year ASCVD risk score (Rathurkrupa PUCKETT Jr., et al., 2013) is: 3.9%      Values used to calculate the score:        Age: 60 years        Sex: Female        Is Non- : No        Diabetic: No        Tobacco smoker: No        Systolic Blood Pressure: 128 mmHg        Is BP treated: No        HDL Cholesterol: 51 mg/dL        Total Cholesterol: 236 mg/dL       Family History -   Family History   Problem Relation Age of Onset     Breast  "Cancer Paternal Aunt      Sleep Apnea Brother      Snoring Brother      Sleep Apnea Brother      Snoring Brother      Celiac Disease Daughter 20.00     Hypertension Father      Cerebrovascular Disease Father 77.00     Cancer Mother        Review of Systems - As per HPI and PMHx, otherwise review of system review of the head and neck negative. Otherwise 10+ review of system is negative    Physical Exam  /84 (BP Location: Right arm, Patient Position: Sitting, Cuff Size: Adult Regular)   Temp 97.9  F (36.6  C) (Temporal)   Ht 1.651 m (5' 5\")   Wt 81.8 kg (180 lb 5 oz)   LMP  (LMP Unknown)   BMI 30.01 kg/m    BMI: Body mass index is 30.01 kg/m .    General - The patient is well nourished and well developed, and appears to have good nutritional status.  Alert and oriented to person and place, answers questions and cooperates with examination appropriately.    SKIN - No suspicious lesions or rashes.  Respiration - No respiratory distress.  Head and Face - Normocephalic and atraumatic, with no gross asymmetry noted of the contour of the facial features.  The facial nerve is intact, with strong symmetric movements.    Voice and Breathing - The patient was breathing comfortably without the use of accessory muscles. The patients voice was clear and strong, and had appropriate pitch and quality.    Ears - Bilateral pinna and EACs with normal appearing overlying skin. Tympanic membrane intact with good mobility on pneumatic otoscopy bilaterally. Bony landmarks of the ossicular chain are normal. The tympanic membranes are normal in appearance. No retraction, perforation, or masses.  No fluid or purulence was seen in the external canal or the middle ear.     Eyes - Extraocular movements intact.  Sclera were not icteric or injected, conjunctiva were pink and moist.    Mouth - Examination of the oral cavity showed pink, healthy oral mucosa. No lesions or ulcerations noted.  The tongue was mobile and midline, and the " dentition were in good condition.      Throat - The walls of the oropharynx were smooth, pink, moist, symmetric, and had no lesions or ulcerations.  Posteriorly some clear secretions were noted with some erythema involving posterior pharyngeal mucosa the tonsillar pillars and soft palate were symmetric.  The uvula was midline on elevation.    Neck - Normal midline excursion of the laryngotracheal complex during swallowing.  Full range of motion on passive movement.  Palpation of the occipital, submental, submandibular, internal jugular chain, and supraclavicular nodes did not demonstrate any abnormal lymph nodes or masses.  The carotid pulse was palpable bilaterally.  Palpation of the thyroid was soft and smooth, with no nodules or goiter appreciated.  The trachea was mobile and midline.    Nose - External contour is symmetric, no gross deflection or scars.  Nasal mucosa is erythematous and moist with no abnormal mucus.  The septum was midline and non-obstructive, turbinates of normal size and position.  No polyps, masses, or purulence noted on examination.    Neuro - Nonfocal neuro exam is normal, CN 2 through 12 intact, normal gait and muscle tone.      Performed in clinic today:  No procedures preformed in clinic today      A/P - Katarina Carter is a 62 year old female with what appears to be acute sinusitis secondary to upper respiratory infection.  Patient is currently on Augmentin 875 twice daily.  She was given a 7-day course would like to extend it to 10-day course.  Patient should also start using fluticasone daily and nasal saline.  If symptoms continue next couple weeks she will be back otherwise see me back as needed.      Art Landon MD        Again, thank you for allowing me to participate in the care of your patient.        Sincerely,        Art Landon MD, MD

## 2023-05-19 ENCOUNTER — TRANSFERRED RECORDS (OUTPATIENT)
Dept: HEALTH INFORMATION MANAGEMENT | Facility: CLINIC | Age: 63
End: 2023-05-19
Payer: COMMERCIAL

## 2023-06-28 ENCOUNTER — TRANSFERRED RECORDS (OUTPATIENT)
Dept: HEALTH INFORMATION MANAGEMENT | Facility: CLINIC | Age: 63
End: 2023-06-28
Payer: COMMERCIAL

## 2023-07-03 DIAGNOSIS — G47.00 INSOMNIA, UNSPECIFIED TYPE: ICD-10-CM

## 2023-07-03 RX ORDER — TRAZODONE HYDROCHLORIDE 50 MG/1
25 TABLET, FILM COATED ORAL AT BEDTIME
Qty: 30 TABLET | Refills: 11 | Status: SHIPPED | OUTPATIENT
Start: 2023-07-03 | End: 2023-09-20

## 2023-07-03 NOTE — TELEPHONE ENCOUNTER
"Last Written Prescription Date:  5/8/2023  Last Fill Quantity: 30,  # refills: 0   Last office visit provider:  5/17/2023     Requested Prescriptions   Pending Prescriptions Disp Refills     traZODone (DESYREL) 50 MG tablet 30 tablet 0     Sig: Take 0.5 tablets (25 mg) by mouth At Bedtime       Serotonin Modulators Passed - 7/3/2023  2:04 PM        Passed - Recent (12 mo) or future (30 days) visit within the authorizing provider's specialty     Patient has had an office visit with the authorizing provider or a provider within the authorizing providers department within the previous 12 mos or has a future within next 30 days. See \"Patient Info\" tab in inbasket, or \"Choose Columns\" in Meds & Orders section of the refill encounter.              Passed - Medication is active on med list        Passed - Patient is age 18 or older        Passed - No active pregnancy on record        Passed - No positive pregnancy test in past 12 months             Fang Flower RN 07/03/23 4:57 PM  "

## 2023-07-20 ENCOUNTER — TELEPHONE (OUTPATIENT)
Dept: FAMILY MEDICINE | Facility: CLINIC | Age: 63
End: 2023-07-20
Payer: COMMERCIAL

## 2023-07-20 NOTE — TELEPHONE ENCOUNTER
FYI - Status Update    Who is Calling: Patient    Update: Wanting to know her cholesterol level and when she had that done. Give pt her Cholesterol level reading from 2021. Pt had no further questions.   Does caller want a call/response back: No

## 2023-09-19 ENCOUNTER — TELEPHONE (OUTPATIENT)
Dept: NURSING | Facility: CLINIC | Age: 63
End: 2023-09-19
Payer: COMMERCIAL

## 2023-09-19 NOTE — TELEPHONE ENCOUNTER
Pt calling re:cold symptoms..    Background:  Pt says she started getting sick on Friday with some cold-like symptoms. Would like to make an appointment for later in the week just in case her symptoms worsen. Says she has a tendency for URI's to turn into sinus infections. Works in a . Pt has not taken a Covid test yet.    Assessment:  N/A    Disposition:  Protocol recommends- did not provide triage at this time. Pt will take a Covid test later today and will call back once she has results so provider can treat accordingly.     Jie Qiu, RN, BSN  -[facility]  Triage Nurse Advisor

## 2023-09-20 ENCOUNTER — OFFICE VISIT (OUTPATIENT)
Dept: FAMILY MEDICINE | Facility: CLINIC | Age: 63
End: 2023-09-20
Payer: COMMERCIAL

## 2023-09-20 VITALS
BODY MASS INDEX: 30.29 KG/M2 | TEMPERATURE: 98.6 F | HEIGHT: 65 IN | RESPIRATION RATE: 17 BRPM | OXYGEN SATURATION: 98 % | HEART RATE: 83 BPM | DIASTOLIC BLOOD PRESSURE: 77 MMHG | SYSTOLIC BLOOD PRESSURE: 150 MMHG | WEIGHT: 181.8 LBS

## 2023-09-20 DIAGNOSIS — J40 BRONCHITIS: Primary | ICD-10-CM

## 2023-09-20 DIAGNOSIS — G47.00 INSOMNIA, UNSPECIFIED TYPE: ICD-10-CM

## 2023-09-20 PROCEDURE — 99213 OFFICE O/P EST LOW 20 MIN: CPT | Performed by: FAMILY MEDICINE

## 2023-09-20 RX ORDER — ALBUTEROL SULFATE 90 UG/1
2 AEROSOL, METERED RESPIRATORY (INHALATION) EVERY 6 HOURS PRN
Qty: 18 G | Refills: 0 | Status: SHIPPED | OUTPATIENT
Start: 2023-09-20

## 2023-09-20 RX ORDER — METHYLPREDNISOLONE 4 MG
TABLET, DOSE PACK ORAL
Qty: 21 TABLET | Refills: 0 | Status: SHIPPED | OUTPATIENT
Start: 2023-09-20 | End: 2023-12-01

## 2023-09-20 RX ORDER — TRAZODONE HYDROCHLORIDE 50 MG/1
25 TABLET, FILM COATED ORAL AT BEDTIME
Qty: 30 TABLET | Refills: 1 | Status: SHIPPED | OUTPATIENT
Start: 2023-09-20 | End: 2023-12-06

## 2023-09-20 RX ORDER — AZITHROMYCIN 250 MG/1
TABLET, FILM COATED ORAL
Qty: 6 TABLET | Refills: 0 | Status: SHIPPED | OUTPATIENT
Start: 2023-09-20 | End: 2023-09-25

## 2023-09-20 RX ORDER — BENZONATATE 100 MG/1
100 CAPSULE ORAL 3 TIMES DAILY PRN
Qty: 30 CAPSULE | Refills: 0 | Status: SHIPPED | OUTPATIENT
Start: 2023-09-20 | End: 2023-10-09

## 2023-09-20 ASSESSMENT — ANXIETY QUESTIONNAIRES
GAD7 TOTAL SCORE: 12
6. BECOMING EASILY ANNOYED OR IRRITABLE: MORE THAN HALF THE DAYS
IF YOU CHECKED OFF ANY PROBLEMS ON THIS QUESTIONNAIRE, HOW DIFFICULT HAVE THESE PROBLEMS MADE IT FOR YOU TO DO YOUR WORK, TAKE CARE OF THINGS AT HOME, OR GET ALONG WITH OTHER PEOPLE: SOMEWHAT DIFFICULT
5. BEING SO RESTLESS THAT IT IS HARD TO SIT STILL: SEVERAL DAYS
7. FEELING AFRAID AS IF SOMETHING AWFUL MIGHT HAPPEN: SEVERAL DAYS
2. NOT BEING ABLE TO STOP OR CONTROL WORRYING: MORE THAN HALF THE DAYS
GAD7 TOTAL SCORE: 12
1. FEELING NERVOUS, ANXIOUS, OR ON EDGE: NEARLY EVERY DAY
3. WORRYING TOO MUCH ABOUT DIFFERENT THINGS: MORE THAN HALF THE DAYS

## 2023-09-20 ASSESSMENT — PAIN SCALES - GENERAL: PAINLEVEL: NO PAIN (0)

## 2023-09-20 ASSESSMENT — PATIENT HEALTH QUESTIONNAIRE - PHQ9: 5. POOR APPETITE OR OVEREATING: SEVERAL DAYS

## 2023-09-20 NOTE — PATIENT INSTRUCTIONS
Take z pack if needed with continued symptoms on Saturday.  Start all other medication starting today.

## 2023-09-20 NOTE — PROGRESS NOTES
Assessment & Plan     Insomnia, unspecified type  Will refill until seen by PCP  - traZODone (DESYREL) 50 MG tablet  Dispense: 30 tablet; Refill: 1    Bronchitis  Discussed likely viral in nature and will do steroid.  If no improvement by Saturday then plan to start the z pack.  Call if any worsening symptoms.  - methylPREDNISolone (MEDROL DOSEPAK) 4 MG tablet therapy pack  Dispense: 21 tablet; Refill: 0  - azithromycin (ZITHROMAX) 250 MG tablet  Dispense: 6 tablet; Refill: 0  - benzonatate (TESSALON) 100 MG capsule  Dispense: 30 capsule; Refill: 0  - albuterol (PROAIR HFA/PROVENTIL HFA/VENTOLIN HFA) 108 (90 Base) MCG/ACT inhaler  Dispense: 18 g; Refill: 0                   Jayla Alicea MD  [facility]    Yumiko Lyn is a 63 year old, presenting for the following health issues:  Cough, Nasal Congestion, and Pharyngitis (Patient stated, woke up at 3a and couldn't breathe because the mucus felt thick)        9/20/2023     8:19 AM   Additional Questions   Roomed by LEONARD Urbina   Accompanied by N/A       History of Present Illness       Reason for visit:  Cheat sore throat  Symptom onset:  1-2 weeks ago  Symptoms include:  Chest sore throat  Symptom intensity:  Moderate  Symptom progression:  Worsening  Had these symptoms before:  No  What makes it worse:  Chest sore throat  What makes it better:  No rest    She eats 2-3 servings of fruits and vegetables daily.She consumes 0 sweetened beverage(s) daily.She exercises with enough effort to increase her heart rate 10 to 19 minutes per day.  She exercises with enough effort to increase her heart rate 3 or less days per week.   She is taking medications regularly.       Working at .  Feels like all in chest. Some laryngitis.  Really bad cough and worse at night.  Felt like choking last night.  Achy.  Negative covid.  Some phlegm with coughing.  Hx of sinus infection.                Review of Systems   Constitutional, HEENT,  "cardiovascular, pulmonary, gi and gu systems are negative, except as otherwise noted.      Objective    BP (!) 150/77 (BP Location: Right arm, Patient Position: Sitting, Cuff Size: Adult Regular)   Pulse 83   Temp 98.6  F (37  C) (Oral)   Resp 17   Ht 1.648 m (5' 4.88\")   Wt 82.5 kg (181 lb 12.8 oz)   LMP 09/01/2015 (Within Months)   SpO2 98%   BMI 30.36 kg/m    Body mass index is 30.36 kg/m .  Physical Exam   GENERAL: healthy, alert and no distress  NECK: no adenopathy, no asymmetry, masses, or scars and thyroid normal to palpation  RESP: lungs clear to auscultation - no rales, rhonchi or wheezes  CV: regular rate and rhythm, normal S1 S2, no S3 or S4, no murmur, click or rub, no peripheral edema and peripheral pulses strong  ABDOMEN: soft, nontender, no hepatosplenomegaly, no masses and bowel sounds normal  MS: no gross musculoskeletal defects noted, no edema                      "

## 2023-09-21 ENCOUNTER — TELEPHONE (OUTPATIENT)
Dept: FAMILY MEDICINE | Facility: CLINIC | Age: 63
End: 2023-09-21
Payer: COMMERCIAL

## 2023-09-21 NOTE — TELEPHONE ENCOUNTER
New Medication Request    Contacts         Type Contact Phone/Fax    09/21/2023 04:06 PM CDT Phone (Incoming) Eboni Carter (Self) [phone] (M)            What medication are you requesting?: Codeine Cough Syrup    Reason for medication request: Patient shares that she has bronchitis and Dr. Alicea gave her several medications.  States she is not sleeping and her cough is keeping her from getting a rest.    Have you taken this medication before?: Yes:     Controlled Substance Agreement on file:   CSA -- Patient Level:    CSA: None found at the patient level.         Patient offered an appointment? No Completed appointment with Dr. Jayla Alicea on 09/20/2023    Preferred Pharmacy:   [place] [facility] [address] [place] [address]  Phone: [phone] Fax: [phone]    Could we send this information to you in [facility] or would you prefer to receive a phone call?:   Patient would prefer a phone call   Okay to leave a detailed message?: Yes at Home number on file [phone] (home)

## 2023-09-22 NOTE — TELEPHONE ENCOUNTER
call patient back    Covering for PCP    I do not generally prescribe cough syrup with codeine    Would recommend tea with honey, consistent use of albuterol 2 puffs every 4-6 hours, Flonase over-the-counter twice a day x1 week to see if that will help with the cough

## 2023-10-09 ENCOUNTER — OFFICE VISIT (OUTPATIENT)
Dept: INTERNAL MEDICINE | Facility: CLINIC | Age: 63
End: 2023-10-09
Payer: COMMERCIAL

## 2023-10-09 VITALS — TEMPERATURE: 98.8 F | DIASTOLIC BLOOD PRESSURE: 78 MMHG | SYSTOLIC BLOOD PRESSURE: 144 MMHG | RESPIRATION RATE: 16 BRPM

## 2023-10-09 DIAGNOSIS — J40 BRONCHITIS: Primary | ICD-10-CM

## 2023-10-09 LAB
DEPRECATED S PYO AG THROAT QL EIA: NEGATIVE
GROUP A STREP BY PCR: NOT DETECTED

## 2023-10-09 PROCEDURE — 90682 RIV4 VACC RECOMBINANT DNA IM: CPT | Performed by: INTERNAL MEDICINE

## 2023-10-09 PROCEDURE — 87651 STREP A DNA AMP PROBE: CPT | Performed by: INTERNAL MEDICINE

## 2023-10-09 PROCEDURE — 90471 IMMUNIZATION ADMIN: CPT | Performed by: INTERNAL MEDICINE

## 2023-10-09 PROCEDURE — 99213 OFFICE O/P EST LOW 20 MIN: CPT | Mod: 25 | Performed by: INTERNAL MEDICINE

## 2023-10-09 RX ORDER — BENZONATATE 100 MG/1
100 CAPSULE ORAL 3 TIMES DAILY PRN
Qty: 30 CAPSULE | Refills: 0 | Status: SHIPPED | OUTPATIENT
Start: 2023-10-09 | End: 2023-12-06

## 2023-10-09 RX ORDER — AZELASTINE 1 MG/ML
1 SPRAY, METERED NASAL 2 TIMES DAILY
Qty: 30 ML | Refills: 0 | Status: SHIPPED | OUTPATIENT
Start: 2023-10-09

## 2023-10-09 ASSESSMENT — ENCOUNTER SYMPTOMS
COUGH: 1
SORE THROAT: 1

## 2023-10-09 NOTE — PATIENT INSTRUCTIONS
Start flonase nasal spray daily, can use astelin nasal spray as well.     Start saline sinus rinses.

## 2023-10-09 NOTE — ASSESSMENT & PLAN NOTE
Patient presents with ongoing cough and sore throat for last 3-4 weeks. No fever, chills, sinus pressure, shortness of breath or chest tightness. She does work in a  center. Exam notable for nasal edema and erythema and clear lung sounds. Discussed with patient that her symptoms and exam are not c/w sinusitis or pneumonia. Still most likely viral bronchitis with post-viral cough syndrome. Should improve with time.  - Start flonase nasal spray for rhinitis to target post-nasal drip that may be contributing to cough, if she gets epistaxis can use astelin instead  - Start saline sinus rinses as well   - Continue PRN benzonatate and albuterol

## 2023-10-09 NOTE — PROGRESS NOTES
Assessment & Plan   Problem List Items Addressed This Visit          Respiratory    Bronchitis - Primary     Patient presents with ongoing cough and sore throat for last 3-4 weeks. No fever, chills, sinus pressure, shortness of breath or chest tightness. She does work in a  center. Exam notable for nasal edema and erythema and clear lung sounds. Discussed with patient that her symptoms and exam are not c/w sinusitis or pneumonia. Still most likely viral bronchitis with post-viral cough syndrome. Should improve with time.  - Start flonase nasal spray for rhinitis to target post-nasal drip that may be contributing to cough, if she gets epistaxis can use astelin instead  - Start saline sinus rinses as well   - Continue PRN benzonatate and albuterol          Relevant Medications    benzonatate (TESSALON) 100 MG capsule    azelastine (ASTELIN) 0.1 % nasal spray    Other Relevant Orders    Streptococcus A Rapid Screen w/Reflex to PCR - Clinic Collect (Completed)    Group A Streptococcus PCR Throat Swab        I spent a total of 24 minutes on the day of the visit.   Time spent by me doing chart review, history and exam, documentation and further activities per the note      Rosemary Cao MD  [facility]    Yumiko Lyn is a 63 year old, presenting for the following health issues:  Pharyngitis (Pt states I got a bad sore throat this weekend, the cough is still there about 3 weeks now)        10/9/2023     8:11 AM   Additional Questions   Roomed by Farhat Amado   Accompanied by self     History of Present Illness     Reason for visit:  Cough chest congestion    Acute Illness  Acute illness concerns: Cough and sore throat  Onset/Duration: 3 week ago   Symptoms:  Fever: No  Chills/Sweats: No  Headache (location?): YES  Sinus Pressure: No  Conjunctivitis:  No  Ear Pain: no  Rhinorrhea: No  Congestion: No  Sore Throat: YES  Cough: YES-productive of yellow sputum  Wheeze: No  Decreased  "Appetite: No  Nausea: No  Vomiting: No  Diarrhea: No  Dysuria/Freq.: No  Dysuria or Hematuria: No  Fatigue/Achiness: No  Sick/Strep Exposure: No  Therapies tried and outcome: Tylenol     Patient was seen 9/20/23 with Dr. Alicea for 1-2 weeks of cough. Given medrol dose pack, z-pack and albuterol.     Since then, still has had a constant cough and on and off sore throat. Says she just feels \"sick\". Headache on and off. Clearing throat frequently. Feels like there is phelgm in upper chest. Since Thursday, sore throat worse again. No fever or chills.     Albuterol does help with coughing, along with tessalon. Not much post-nasal drip. Says her reflux symptoms are well controlled. Does not have sinus pressure like she did last May when she was treated for sinus infection. Denies shortness of breath or chest tightness.     She eats 2-3 servings of fruits and vegetables daily.She consumes 0 sweetened beverage(s) daily.She exercises with enough effort to increase her heart rate 10 to 19 minutes per day.  She exercises with enough effort to increase her heart rate 3 or less days per week.   She is taking medications regularly.    Review of Systems   HENT:  Positive for sore throat.    Respiratory:  Positive for cough.       Constitutional, HEENT, cardiovascular, pulmonary, gi and gu systems are negative, except as otherwise noted.      Objective    BP (!) 144/78 (BP Location: Right arm, Patient Position: Sitting, Cuff Size: Adult Regular)   Temp 98.8  F (37.1  C) (Oral)   Resp 16   LMP 09/01/2015 (Within Months)   There is no height or weight on file to calculate BMI.  Physical Exam   GENERAL: healthy, alert and no distress  EYES: Eyes grossly normal to inspection, PERRL and conjunctivae and sclerae normal  HENT: ear canals and TM's normal, b/l nares erythematous and edematous (L>R), posterior oropharynx also erythematous, no white plaques or patches  NECK: no adenopathy, no asymmetry, masses, or scars and thyroid normal " to palpation  RESP: lungs clear to auscultation - no rales, rhonchi or wheezes  CV: regular rate and rhythm, normal S1 S2, no S3 or S4, no murmur, click or rub, no peripheral edema and peripheral pulses strong  MS: no gross musculoskeletal defects noted, no edema  SKIN: no suspicious lesions or rashes on exposed skin   NEURO: No focal deficits, mentation intact and speech normal  PSYCH: mentation appears normal, affect normal/bright    Results for orders placed or performed in visit on 10/09/23 (from the past 24 hour(s))   Streptococcus A Rapid Screen w/Reflex to PCR - Clinic Collect    Specimen: Throat; Swab   Result Value Ref Range    Group A Strep antigen Negative Negative

## 2023-10-16 DIAGNOSIS — R05.3 CHRONIC COUGH: Primary | ICD-10-CM

## 2023-10-30 ENCOUNTER — OFFICE VISIT (OUTPATIENT)
Dept: PULMONOLOGY | Facility: CLINIC | Age: 63
End: 2023-10-30
Payer: COMMERCIAL

## 2023-10-30 DIAGNOSIS — R05.3 CHRONIC COUGH: ICD-10-CM

## 2023-10-30 LAB — HGB BLD-MCNC: 16.2 G/DL

## 2023-10-30 PROCEDURE — 94726 PLETHYSMOGRAPHY LUNG VOLUMES: CPT | Performed by: INTERNAL MEDICINE

## 2023-10-30 PROCEDURE — 94729 DIFFUSING CAPACITY: CPT | Performed by: INTERNAL MEDICINE

## 2023-10-30 PROCEDURE — 85018 HEMOGLOBIN: CPT | Mod: QW | Performed by: INTERNAL MEDICINE

## 2023-10-30 PROCEDURE — 94060 EVALUATION OF WHEEZING: CPT | Performed by: INTERNAL MEDICINE

## 2023-10-31 LAB
DLCOCOR-%PRED-PRE: 123 %
DLCOCOR-PRE: 24.21 ML/MIN/MMHG
DLCOUNC-%PRED-PRE: 132 %
DLCOUNC-PRE: 26.06 ML/MIN/MMHG
DLCOUNC-PRED: 19.63 ML/MIN/MMHG
ERV-%PRED-PRE: 61 %
ERV-PRE: 0.66 L
ERV-PRED: 1.07 L
EXPTIME-PRE: 6.63 SEC
FEF2575-%PRED-POST: 210 %
FEF2575-%PRED-PRE: 181 %
FEF2575-POST: 4.28 L/SEC
FEF2575-PRE: 3.7 L/SEC
FEF2575-PRED: 2.04 L/SEC
FEFMAX-%PRED-PRE: 138 %
FEFMAX-PRE: 8.51 L/SEC
FEFMAX-PRED: 6.15 L/SEC
FEV1-%PRED-PRE: 122 %
FEV1-PRE: 2.78 L
FEV1FEV6-PRE: 85 %
FEV1FEV6-PRED: 80 %
FEV1FVC-PRE: 85 %
FEV1FVC-PRED: 80 %
FEV1SVC-PRE: 84 %
FEV1SVC-PRED: 70 %
FIFMAX-PRE: 6.28 L/SEC
FRCPLETH-%PRED-PRE: 94 %
FRCPLETH-PRE: 2.56 L
FRCPLETH-PRED: 2.72 L
FVC-%PRED-PRE: 114 %
FVC-PRE: 3.27 L
FVC-PRED: 2.85 L
IC-%PRED-PRE: 123 %
IC-PRE: 2.64 L
IC-PRED: 2.14 L
RVPLETH-%PRED-PRE: 96 %
RVPLETH-PRE: 1.89 L
RVPLETH-PRED: 1.96 L
TLCPLETH-%PRED-PRE: 104 %
TLCPLETH-PRE: 5.2 L
TLCPLETH-PRED: 4.98 L
VA-%PRED-PRE: 102 %
VA-PRE: 4.81 L
VC-%PRED-PRE: 102 %
VC-PRE: 3.3 L
VC-PRED: 3.22 L

## 2023-11-01 ENCOUNTER — OFFICE VISIT (OUTPATIENT)
Dept: PULMONOLOGY | Facility: CLINIC | Age: 63
End: 2023-11-01
Payer: COMMERCIAL

## 2023-11-01 ENCOUNTER — LAB REQUISITION (OUTPATIENT)
Dept: LAB | Facility: CLINIC | Age: 63
End: 2023-11-01

## 2023-11-01 VITALS
WEIGHT: 180 LBS | BODY MASS INDEX: 30.06 KG/M2 | SYSTOLIC BLOOD PRESSURE: 134 MMHG | OXYGEN SATURATION: 98 % | HEART RATE: 70 BPM | DIASTOLIC BLOOD PRESSURE: 88 MMHG

## 2023-11-01 DIAGNOSIS — J01.91 ACUTE RECURRENT SINUSITIS, UNSPECIFIED LOCATION: ICD-10-CM

## 2023-11-01 DIAGNOSIS — Z12.4 ENCOUNTER FOR SCREENING FOR MALIGNANT NEOPLASM OF CERVIX: ICD-10-CM

## 2023-11-01 DIAGNOSIS — R05.3 CHRONIC COUGH: Primary | ICD-10-CM

## 2023-11-01 PROCEDURE — 99204 OFFICE O/P NEW MOD 45 MIN: CPT | Performed by: NURSE PRACTITIONER

## 2023-11-01 PROCEDURE — G0145 SCR C/V CYTO,THINLAYER,RESCR: HCPCS | Performed by: OBSTETRICS & GYNECOLOGY

## 2023-11-01 RX ORDER — BUDESONIDE AND FORMOTEROL FUMARATE DIHYDRATE 80; 4.5 UG/1; UG/1
2 AEROSOL RESPIRATORY (INHALATION) 2 TIMES DAILY
Qty: 6.9 G | Refills: 4 | Status: SHIPPED | OUTPATIENT
Start: 2023-11-01 | End: 2023-12-01 | Stop reason: ALTCHOICE

## 2023-11-01 NOTE — PROGRESS NOTES
Pulmonary Clinic Consultation          Assessment/Plan:     63 year old female with a history of sinusitis, presenting for evaluation of chronic cough.    Chronic cough  Recurrent sinusitis  Lifelong non-smoker presents for evaluation of chronic cough.  She developed URI in September and has been feeling unwell ever since.  She has been treated with azithromycin, methylprednisolone, Azelastine nasal spray without benefit.  She did receive Albuterol and notes this helps with cough.  She is having ongoing coughing, fatigue, muscle aches, swollen lymph nodes in neck.  She is clearing her throat throughout exam, with clear LS and swollen turbinates on left. Pulmonary function testing shows normal spirometry, lung volumes, and diffusion capacity.  Plan:  - start augmentin 875/125 BID x10 days, for sinusitis.  - start Symbicort (budesonide/formoterol) 80/4.5, two puffs BID, rinse/gargle after use - for post-viral chronic cough.    Follow-up  - one month      Raisa Duff, CNP  Pulmonary Medicine  [facility]  [phone]       CC:     Cough evaluation     HPI:     63 year old female with a history of sinusitis, presenting for evaluation of chronic cough.    Works at .  September, bad sore throat and cough.  Does have hx of sinus infections, saw ENT in May.  Went to primary care 9/20, diagnosed with bronchitis, given azithromycin, medrol dose pack.  No real benefit.  Given Albuterol as well, beneficial.  Primary care 10/9, given azelastine.  Using as needed, not sure if beneficial.  Constant cough.  Poor appetite, nothing tasting good.  Fatigued, body aches.  Swollen lymph nodes.  Hx of GERD - controlled currently.  No seasonal or environmental allergies.  Lifelong non-smoker.    Medical records reviewed for this visit include multiple primary care provider notes.     ROS:     A 12-system review was obtained and was negative with the exception of the symptoms endorsed in the  HPI.       Medical history:       PMH:  No past medical history on file.    PSH:  Past Surgical History:   Procedure Laterality Date    HIATAL HERNIA REPAIR      [facility]  DELIVERY ONLY      Description:  Section;  Proc Date: 1990;    [facility]  DELIVERY ONLY      Description:  Section;  Proc Date: 1997;    [facility]  DELIVERY ONLY      Description:  Section;  Proc Date: 1999;    [facility] LIGATE FALLOPIAN TUBE      Description: Tubal Ligation;  Proc Date: 1999;       Allergies:  Allergies   Allergen Reactions    Citalopram Unknown    Escitalopram Unknown       Family Hx:  Family History   Problem Relation Age of Onset    Breast Cancer Paternal Aunt     Sleep Apnea Brother     Snoring Brother     Sleep Apnea Brother     Snoring Brother     Celiac Disease Daughter 20.00    Hypertension Father     Cerebrovascular Disease Father 77.00    Cancer Mother        Social Hx:  Social History     Socioeconomic History    Marital status:      Spouse name: Not on file    Number of children: Not on file    Years of education: Not on file    Highest education level: Not on file   Occupational History    Not on file   Tobacco Use    Smoking status: Never     Passive exposure: Never    Smokeless tobacco: Never   Vaping Use    Vaping Use: Never used   Substance and Sexual Activity    Alcohol use: No    Drug use: Never    Sexual activity: Not Currently     Partners: Male   Other Topics Concern    Not on file   Social History Narrative    Patient is  and has 3 children age 31,24,21  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  He did not go to college.  Medical daughter Jaclyn is in 3 M  Older daughter Kaycee Adkins     Coagulant  for hemophi jeanna Sanchez MD  3/11/2021                The 10-year ASCVD risk score (Arthur PUCKETT Jr., et al., 2013) is: 3.9%      Values used to calculate the score:        Age: 60 years        Sex:  Female        Is Non- : No        Diabetic: No        Tobacco smoker: No        Systolic Blood Pressure: 128 mmHg        Is BP treated: No        HDL Cholesterol: 51 mg/dL        Total Cholesterol: 236 mg/dL     Social Determinants of Health     Financial Resource Strain: Low Risk  (9/20/2023)    Financial Resource Strain     Within the past 12 months, have you or your family members you live with been unable to get utilities (heat, electricity) when it was really needed?: No   Food Insecurity: Low Risk  (9/20/2023)    Food Insecurity     Within the past 12 months, did you worry that your food would run out before you got money to buy more?: No     Within the past 12 months, did the food you bought just not last and you didn t have money to get more?: No   Transportation Needs: Low Risk  (9/20/2023)    Transportation Needs     Within the past 12 months, has lack of transportation kept you from medical appointments, getting your medicines, non-medical meetings or appointments, work, or from getting things that you need?: No   Physical Activity: Not on file   Stress: Not on file   Social Connections: Not on file   Interpersonal Safety: Low Risk  (9/20/2023)    Interpersonal Safety     Do you feel physically and emotionally safe where you currently live?: Yes     Within the past 12 months, have you been hit, slapped, kicked or otherwise physically hurt by someone?: No     Within the past 12 months, have you been humiliated or emotionally abused in other ways by your partner or ex-partner?: No   Housing Stability: Low Risk  (9/20/2023)    Housing Stability     Do you have housing? : Yes     Are you worried about losing your housing?: No       Current Meds:  Current Outpatient Medications   Medication Sig Dispense Refill    albuterol (PROAIR HFA/PROVENTIL HFA/VENTOLIN HFA) 108 (90 Base) MCG/ACT inhaler Inhale 2 puffs into the lungs every 6 hours as needed for shortness of breath, wheezing or cough  18 g 0    azelastine (ASTELIN) 0.1 % nasal spray Spray 1 spray into both nostrils 2 times daily 30 mL 0    benzonatate (TESSALON) 100 MG capsule Take 1 capsule (100 mg) by mouth 3 times daily as needed for cough 30 capsule 0    cyclobenzaprine (FLEXERIL) 5 MG tablet Take 5 mg by mouth every 8 hours as needed      estradiol (ESTRACE) 0.5 MG tablet Take 0.5 mg by mouth      famotidine (PEPCID) 10 MG tablet Take 10 mg by mouth At Bedtime      medroxyPROGESTERone (PROVERA) 2.5 MG tablet [MEDROXYPROGESTERONE (PROVERA) 2.5 MG TABLET] TAKE 1 TABLET BY MOUTH DAILY FOR 90 DAYS  0    methylPREDNISolone (MEDROL DOSEPAK) 4 MG tablet therapy pack Follow Package Directions 21 tablet 0    traZODone (DESYREL) 50 MG tablet Take 0.5 tablets (25 mg) by mouth At Bedtime 30 tablet 1    Melatonin 1 MG CHEW Take 2 mg by mouth (Patient not taking: Reported on 9/20/2023)            Physical Exam:     /88 (BP Location: Right arm, Patient Position: Sitting, Cuff Size: Adult Regular)   Pulse 70   Wt 81.6 kg (180 lb)   LMP 09/01/2015 (Within Months)   SpO2 98%   BMI 30.06 kg/m    Gen: adult female , appears in NAD  HEENT: clear conjunctivae, moist mucous membranes.  Clearing throat throughout exam.  Swollen turbinates on left.  CV: RRR, no M/G/R  Resp: CTAB, no focal crackles or wheezes.  Respirations even and unlabored.  On RA.   Skin: no apparent rashes on visible skin  Ext: no cyanosis, clubbing or edema  Neuro: alert and answering questions appropriately       Data:     Labs:  reviewed    Imaging studies:  I have personally reviewed all pertinent imaging studies and PFT results unless otherwise noted.      Pulmonary Function Testing    FEV1/FVC is 85% and is normal.   FEV1 is 2.78L (122%) predicted and is normal.   FVC is 3.27L (114%) predicted and normal.   There  is not improvement in spirometry after a single inhaled dose of bronchodilator.   TLC is 5.20L (104%) predicted and is normal.   RV is 1.89L (96%) predicted and is  normal.   DLCO is 24.21ml/min/hg (123%) predicted and is normal when it is corrected for hemoglobin.   Flow volume loops indicate no abnormalities.   Impression:  Full Pulmonary Function Test is normal.  PFTs are consistent with  no  obstructive disease.   Spirometry is not consistent with reversibility.   There  is not  hyperinflation.   There  is not  air-trapping.   Diffusion capacity when corrected for hemoglobin is normal.   The ATS criteria for acceptability and reproducibility was met.

## 2023-11-01 NOTE — PATIENT INSTRUCTIONS
It was a pleasure to see you in clinic today.   Here is what we discussed:    Start Symbicort two puffs twice daily, rinse/gargle after use.  Start augmentin twice daily x10 days.  Continue Albuterol every 4-6 hours as needed for shortness of breath or wheezing.  Call my nurse, Deep ([phone]) with any change or worsening of your breathing.  Follow-up in one month.    Raisa Duff, CNP  Pulmonary Medicine  [facility]  [phone]

## 2023-11-02 ENCOUNTER — TELEPHONE (OUTPATIENT)
Dept: PULMONOLOGY | Facility: CLINIC | Age: 63
End: 2023-11-02
Payer: COMMERCIAL

## 2023-11-02 NOTE — TELEPHONE ENCOUNTER
Prior Authorization Retail Medication Request    Medication/Dose: Budesonide - formoterol (symbicort) 80/4.5mcg inhaler  ICD code (if different than what is on RX):  R05.3  Previously Tried and Failed:  prednisone, nasal sprays, azithromycin  Rationale:  for post-viral chronic cough    Insurance Name:  [facility] OUT OF STATE   Insurance ID:  [id number]       Pharmacy Information (if different than what is on RX)  [facility] [place]  Phone:

## 2023-11-03 LAB
BKR LAB AP GYN ADEQUACY: NORMAL
BKR LAB AP GYN INTERPRETATION: NORMAL
BKR LAB AP HPV REFLEX: NORMAL
BKR LAB AP LMP: NORMAL
BKR LAB AP PREVIOUS ABNL DX: NORMAL
BKR LAB AP PREVIOUS ABNORMAL: NORMAL
PATH REPORT.COMMENTS IMP SPEC: NORMAL
PATH REPORT.COMMENTS IMP SPEC: NORMAL
PATH REPORT.RELEVANT HX SPEC: NORMAL

## 2023-11-06 ENCOUNTER — TELEPHONE (OUTPATIENT)
Dept: PULMONOLOGY | Facility: CLINIC | Age: 63
End: 2023-11-06
Payer: COMMERCIAL

## 2023-11-06 NOTE — TELEPHONE ENCOUNTER
Was not able to reach patient back by phone.  Left detailed VM message.  Have not heard back on the PA for symbicort as of yet.    Did explain that AirDuo may be option if this is not approved and affordable for her.  This would be using goodRx instead of her insurance.      Will let her know when the PA is completed.

## 2023-11-07 NOTE — TELEPHONE ENCOUNTER
Central Prior Authorization Team   Phone: [phone]    PA Initiation    Medication: Budesonide - formoterol (symbicort) 80/4.5mcg inhaler  Insurance Company: Comment:  [facility]  [id number] [place] [facility] [address] [place] [address]  Filling Pharmacy Phone: [phone]  Filling Pharmacy Fax:    Start Date: 11/7/2023

## 2023-11-09 ENCOUNTER — MYC MEDICAL ADVICE (OUTPATIENT)
Dept: PULMONOLOGY | Facility: CLINIC | Age: 63
End: 2023-11-09
Payer: COMMERCIAL

## 2023-11-09 NOTE — TELEPHONE ENCOUNTER
Prior Authorization Not Needed per Insurance    Medication: Budesonide - formoterol (symbicort) 80/4.5mcg inhaler-PA  NOT NEEDED   Insurance Company: Comment:  [facility]  [phone]  Expected CoPay:   $228   [id number] [place] [address]  Pharmacy Notified:  Yes  Patient Notified:  No    Insurance states that PA is Not Needed and medication is covered. Called pharmacy and pharmacy stated that they have a paid claim on generic budesonide-formoterol  and will notify the patient when medication is ready for up. Pharmacy stated that patients co-pay is $228 which can possibly be due to patients deductible/coverage gap. Pharmacy will notify the patent on cost and when medication is ready for pickup.

## 2023-11-15 ENCOUNTER — TELEPHONE (OUTPATIENT)
Dept: PULMONOLOGY | Facility: CLINIC | Age: 63
End: 2023-11-15
Payer: COMMERCIAL

## 2023-11-15 NOTE — TELEPHONE ENCOUNTER
Spoke with Eboni. She is still having a little bit of coughing here and there but has much improved. She would like to try the Airduo and would use a Good Rx with this. If approved, [facility].

## 2023-11-16 DIAGNOSIS — R05.3 CHRONIC COUGH: Primary | ICD-10-CM

## 2023-11-16 RX ORDER — FLUTICASONE PROPIONATE AND SALMETEROL 55; 14 UG/1; UG/1
1 POWDER, METERED RESPIRATORY (INHALATION) 2 TIMES DAILY
Qty: 1 EACH | Refills: 4 | Status: SHIPPED | OUTPATIENT
Start: 2023-11-16 | End: 2024-08-09

## 2023-11-29 NOTE — PROGRESS NOTES
Pulmonary Clinic Follow-up          Assessment/Plan:     63 year old female with a history of sinusitis, presenting for one month follow-up of chronic cough.    Chronic cough  Recurrent sinusitis  Lifelong non-smoker presents for evaluation of chronic cough.  She developed URI in September and has been feeling unwell ever since.  She has been treated with azithromycin, methylprednisolone, Azelastine nasal spray without benefit.  She did receive Albuterol and notes this helps with cough.  She is having ongoing coughing, fatigue, muscle aches, swollen lymph nodes in neck.  She is clearing her throat throughout exam, with clear LS and swollen turbinates on left. Pulmonary function testing shows normal spirometry, lung volumes, and diffusion capacity.  Last visit she was initiated on Symbicort (was switched to Airduo d/t insurance coverage) and given augmentin x10 days for possible sinusitis.  She did not end up taking the augmentin, and found improvement in cough with Airduo (although only taking once daily).  She now has another URI.  Plan:  - okay to start augmentin 875/125 BID x10 days, for sinusitis - this was previously prescribed but she did not take.  She now worries she has another sinus infection.  - continue Airduo (fluticasone/salmeterol) 55/14, one puff BID, rinse/gargle after use - for post-viral chronic cough.  - encouraged saline rinses BID to help with sinus issues and prevent further infections.    Follow-up  - 3 months      Rasia Duff, CNP  Pulmonary Medicine  [facility]  [phone]       CC:     Cough follow-up     HPI:     63 year old female with a history of sinusitis, presenting for one month follow-up of chronic cough.    Since last visit (11/1/23), used airduo once/day, cough did improve.  She did not take augmentin previously.  She now has another URI, works in  and lots of sick kids.  She is wondering if she can take the previously prescribed  augmentin as she feels she has another sinus infection.    Previous HPI:  Works at .  September, bad sore throat and cough.  Does have hx of sinus infections, saw ENT in May.  Went to primary care , diagnosed with bronchitis, given azithromycin, medrol dose pack.  No real benefit.  Given Albuterol as well, beneficial.  Primary care 10/9, given azelastine.  Using as needed, not sure if beneficial.  Constant cough.  Poor appetite, nothing tasting good.  Fatigued, body aches.  Swollen lymph nodes.  Hx of GERD - controlled currently.  No seasonal or environmental allergies.  Lifelong non-smoker.       ROS:     A 6-system review was obtained and was negative with the exception of the symptoms endorsed in the HPI.       Medical history:       PMH:  No past medical history on file.    PSH:  Past Surgical History:   Procedure Laterality Date    HIATAL HERNIA REPAIR      [facility]  DELIVERY ONLY      Description:  Section;  Proc Date: 1990;    [facility]  DELIVERY ONLY      Description:  Section;  Proc Date: 1997;    [facility]  DELIVERY ONLY      Description:  Section;  Proc Date: 1999;    [facility] LIGATE FALLOPIAN TUBE      Description: Tubal Ligation;  Proc Date: 1999;       Allergies:  Allergies   Allergen Reactions    Citalopram Unknown    Escitalopram Unknown       Family Hx:  Family History   Problem Relation Age of Onset    Breast Cancer Paternal Aunt     Sleep Apnea Brother     Snoring Brother     Sleep Apnea Brother     Snoring Brother     Celiac Disease Daughter 20.00    Hypertension Father     Cerebrovascular Disease Father 77.00    Cancer Mother        Social Hx:  Social History     Socioeconomic History    Marital status:      Spouse name: Not on file    Number of children: Not on file    Years of education: Not on file    Highest education level: Not on file   Occupational History    Not on file   Tobacco Use    Smoking status: Never      Passive exposure: Never    Smokeless tobacco: Never   Vaping Use    Vaping Use: Never used   Substance and Sexual Activity    Alcohol use: No    Drug use: Never    Sexual activity: Not Currently     Partners: Male   Other Topics Concern    Not on file   Social History Narrative    Patient is  and has 3 children age 31,24,21  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  He did not go to college.  Medical daughter Jaclyn is in 3 M  Older daughter Kaycee Adkins     Coagulant  for hemophi jeanna   Stephen Sanchez MD  3/11/2021                The 10-year ASCVD risk score (Arthur PUCKETT JrEsme, et al., 2013) is: 3.9%      Values used to calculate the score:        Age: 60 years        Sex: Female        Is Non- : No        Diabetic: No        Tobacco smoker: No        Systolic Blood Pressure: 128 mmHg        Is BP treated: No        HDL Cholesterol: 51 mg/dL        Total Cholesterol: 236 mg/dL     Social Determinants of Health     Financial Resource Strain: Low Risk  (9/20/2023)    Financial Resource Strain     Within the past 12 months, have you or your family members you live with been unable to get utilities (heat, electricity) when it was really needed?: No   Food Insecurity: Low Risk  (9/20/2023)    Food Insecurity     Within the past 12 months, did you worry that your food would run out before you got money to buy more?: No     Within the past 12 months, did the food you bought just not last and you didn t have money to get more?: No   Transportation Needs: Low Risk  (9/20/2023)    Transportation Needs     Within the past 12 months, has lack of transportation kept you from medical appointments, getting your medicines, non-medical meetings or appointments, work, or from getting things that you need?: No   Physical Activity: Not on file   Stress: Not on file   Social Connections: Not on file   Interpersonal Safety: Low Risk  (9/20/2023)    Interpersonal  Safety     Do you feel physically and emotionally safe where you currently live?: Yes     Within the past 12 months, have you been hit, slapped, kicked or otherwise physically hurt by someone?: No     Within the past 12 months, have you been humiliated or emotionally abused in other ways by your partner or ex-partner?: No   Housing Stability: Low Risk  (9/20/2023)    Housing Stability     Do you have housing? : Yes     Are you worried about losing your housing?: No       Current Meds:  Current Outpatient Medications   Medication Sig Dispense Refill    albuterol (PROAIR HFA/PROVENTIL HFA/VENTOLIN HFA) 108 (90 Base) MCG/ACT inhaler Inhale 2 puffs into the lungs every 6 hours as needed for shortness of breath, wheezing or cough 18 g 0    benzonatate (TESSALON) 100 MG capsule Take 1 capsule (100 mg) by mouth 3 times daily as needed for cough 30 capsule 0    cyclobenzaprine (FLEXERIL) 5 MG tablet Take 5 mg by mouth every 8 hours as needed      estradiol (ESTRACE) 0.5 MG tablet Take 0.5 mg by mouth      famotidine (PEPCID) 10 MG tablet Take 10 mg by mouth at bedtime As needed      fluticasone-salmeterol (AIRDUO RESPICLICK) 55-14 MCG/ACT inhaler Inhale 1 puff into the lungs 2 times daily 1 each 4    medroxyPROGESTERone (PROVERA) 2.5 MG tablet [MEDROXYPROGESTERONE (PROVERA) 2.5 MG TABLET] TAKE 1 TABLET BY MOUTH DAILY FOR 90 DAYS  0    Melatonin 1 MG CHEW Take 2 mg by mouth As needed      traZODone (DESYREL) 50 MG tablet Take 0.5 tablets (25 mg) by mouth At Bedtime (Patient taking differently: Take 25 mg by mouth at bedtime As needed) 30 tablet 1    azelastine (ASTELIN) 0.1 % nasal spray Spray 1 spray into both nostrils 2 times daily (Patient not taking: Reported on 12/1/2023) 30 mL 0          Physical Exam:     /86 (BP Location: Left arm, Patient Position: Chair, Cuff Size: Adult Regular)   Pulse 76   Wt 80.7 kg (178 lb)   LMP 09/01/2015 (Within Months)   SpO2 98%   BMI 29.73 kg/m    Gen: adult female , appears  in NAD  HEENT: clear conjunctivae, mask in place  CV: RRR, no M/G/R  Resp: CTAB, no focal crackles or wheezes.  Respirations even and unlabored.  On RA.   Skin: no apparent rashes on visible skin  Ext: no cyanosis, clubbing or edema  Neuro: alert and answering questions appropriately       Data:     Labs:  reviewed    Imaging studies:  I have personally reviewed all pertinent imaging studies and PFT results unless otherwise noted.      Pulmonary Function Testing    FEV1/FVC is 85% and is normal.   FEV1 is 2.78L (122%) predicted and is normal.   FVC is 3.27L (114%) predicted and normal.   There  is not improvement in spirometry after a single inhaled dose of bronchodilator.   TLC is 5.20L (104%) predicted and is normal.   RV is 1.89L (96%) predicted and is normal.   DLCO is 24.21ml/min/hg (123%) predicted and is normal when it is corrected for hemoglobin.   Flow volume loops indicate no abnormalities.   Impression:  Full Pulmonary Function Test is normal.  PFTs are consistent with  no  obstructive disease.   Spirometry is not consistent with reversibility.   There  is not  hyperinflation.   There  is not  air-trapping.   Diffusion capacity when corrected for hemoglobin is normal.   The ATS criteria for acceptability and reproducibility was met.

## 2023-12-01 ENCOUNTER — OFFICE VISIT (OUTPATIENT)
Dept: PULMONOLOGY | Facility: CLINIC | Age: 63
End: 2023-12-01
Attending: NURSE PRACTITIONER
Payer: COMMERCIAL

## 2023-12-01 VITALS
OXYGEN SATURATION: 98 % | HEART RATE: 76 BPM | SYSTOLIC BLOOD PRESSURE: 132 MMHG | BODY MASS INDEX: 29.73 KG/M2 | DIASTOLIC BLOOD PRESSURE: 86 MMHG | WEIGHT: 178 LBS

## 2023-12-01 DIAGNOSIS — R05.3 CHRONIC COUGH: Primary | ICD-10-CM

## 2023-12-01 DIAGNOSIS — J01.91 ACUTE RECURRENT SINUSITIS, UNSPECIFIED LOCATION: ICD-10-CM

## 2023-12-01 PROCEDURE — 99213 OFFICE O/P EST LOW 20 MIN: CPT | Performed by: NURSE PRACTITIONER

## 2023-12-01 NOTE — PATIENT INSTRUCTIONS
It was a pleasure to see you in clinic today.   Here is what we discussed:    Okay to take the previously prescribed augmentin for potential sinus infection.  Continue Airduo twice daily, rinse/gargle after use.  Try using saline spray twice daily to rinse your sinuses and help prevent sinus infections.  Call my nurse, Deep ([phone]) with any change or worsening of your breathing.  Follow-up in 3 months.    Raisa Duff, CNP  Pulmonary Medicine  [facility]  [phone]

## 2023-12-06 ENCOUNTER — OFFICE VISIT (OUTPATIENT)
Dept: FAMILY MEDICINE | Facility: CLINIC | Age: 63
End: 2023-12-06
Payer: COMMERCIAL

## 2023-12-06 VITALS
SYSTOLIC BLOOD PRESSURE: 141 MMHG | HEIGHT: 65 IN | TEMPERATURE: 98.9 F | DIASTOLIC BLOOD PRESSURE: 70 MMHG | WEIGHT: 176.8 LBS | HEART RATE: 75 BPM | BODY MASS INDEX: 29.46 KG/M2 | RESPIRATION RATE: 16 BRPM | OXYGEN SATURATION: 98 %

## 2023-12-06 DIAGNOSIS — L65.9 HAIR LOSS: ICD-10-CM

## 2023-12-06 DIAGNOSIS — L29.9 ITCHING OF EAR: ICD-10-CM

## 2023-12-06 DIAGNOSIS — G47.00 INSOMNIA, UNSPECIFIED TYPE: ICD-10-CM

## 2023-12-06 DIAGNOSIS — Z00.00 ROUTINE GENERAL MEDICAL EXAMINATION AT A HEALTH CARE FACILITY: Primary | ICD-10-CM

## 2023-12-06 DIAGNOSIS — M79.7 FIBROMYALGIA: ICD-10-CM

## 2023-12-06 DIAGNOSIS — R53.82 CHRONIC FATIGUE: ICD-10-CM

## 2023-12-06 DIAGNOSIS — E78.00 PURE HYPERCHOLESTEROLEMIA: ICD-10-CM

## 2023-12-06 LAB
ERYTHROCYTE [DISTWIDTH] IN BLOOD BY AUTOMATED COUNT: 12.7 % (ref 10–15)
HBA1C MFR BLD: 5.2 % (ref 0–5.6)
HCT VFR BLD AUTO: 44 % (ref 35–47)
HGB BLD-MCNC: 15.2 G/DL (ref 11.7–15.7)
MCH RBC QN AUTO: 30 PG (ref 26.5–33)
MCHC RBC AUTO-ENTMCNC: 34.5 G/DL (ref 31.5–36.5)
MCV RBC AUTO: 87 FL (ref 78–100)
PLATELET # BLD AUTO: 280 10E3/UL (ref 150–450)
RBC # BLD AUTO: 5.07 10E6/UL (ref 3.8–5.2)
WBC # BLD AUTO: 7.3 10E3/UL (ref 4–11)

## 2023-12-06 PROCEDURE — 99214 OFFICE O/P EST MOD 30 MIN: CPT | Mod: 25 | Performed by: FAMILY MEDICINE

## 2023-12-06 PROCEDURE — 80053 COMPREHEN METABOLIC PANEL: CPT | Performed by: FAMILY MEDICINE

## 2023-12-06 PROCEDURE — 82728 ASSAY OF FERRITIN: CPT | Performed by: FAMILY MEDICINE

## 2023-12-06 PROCEDURE — 84443 ASSAY THYROID STIM HORMONE: CPT | Performed by: FAMILY MEDICINE

## 2023-12-06 PROCEDURE — 85027 COMPLETE CBC AUTOMATED: CPT | Performed by: FAMILY MEDICINE

## 2023-12-06 PROCEDURE — 80061 LIPID PANEL: CPT | Performed by: FAMILY MEDICINE

## 2023-12-06 PROCEDURE — 99396 PREV VISIT EST AGE 40-64: CPT | Performed by: FAMILY MEDICINE

## 2023-12-06 PROCEDURE — 83036 HEMOGLOBIN GLYCOSYLATED A1C: CPT | Performed by: FAMILY MEDICINE

## 2023-12-06 PROCEDURE — 82306 VITAMIN D 25 HYDROXY: CPT | Performed by: FAMILY MEDICINE

## 2023-12-06 PROCEDURE — 82607 VITAMIN B-12: CPT | Performed by: FAMILY MEDICINE

## 2023-12-06 PROCEDURE — 36415 COLL VENOUS BLD VENIPUNCTURE: CPT | Performed by: FAMILY MEDICINE

## 2023-12-06 RX ORDER — TRAZODONE HYDROCHLORIDE 50 MG/1
25 TABLET, FILM COATED ORAL AT BEDTIME
Qty: 30 TABLET | Refills: 1 | Status: SHIPPED | OUTPATIENT
Start: 2023-12-06 | End: 2024-03-04

## 2023-12-06 RX ORDER — TRIAMCINOLONE ACETONIDE 1 MG/G
CREAM TOPICAL 2 TIMES DAILY
Qty: 30 G | Refills: 1 | Status: SHIPPED | OUTPATIENT
Start: 2023-12-06

## 2023-12-06 ASSESSMENT — ENCOUNTER SYMPTOMS
WEAKNESS: 1
HEARTBURN: 1
BREAST MASS: 0
ARTHRALGIAS: 1
MYALGIAS: 1

## 2023-12-06 NOTE — PROGRESS NOTES
SUBJECTIVE:   Eboni is a 63 year old, presenting for the following:  Physical (Fasting. )        2023    10:25 AM   Additional Questions   Roomed by        Healthy Habits:     Getting at least 3 servings of Calcium per day:  Yes    Bi-annual eye exam:  Yes    Dental care twice a year:  Yes    Sleep apnea or symptoms of sleep apnea:  None    Diet:  Regular (no restrictions)    Frequency of exercise:  1 day/week    Duration of exercise:  Less than 15 minutes    Taking medications regularly:  Yes    Medication side effects:  None    Additional concerns today:  No    Eboni is in today for her annual exam.  She is new to me today, normally sees another provider.  She does see OB/GYN for her breast exams and pelvic exams and they manage her HRT.  She has just a couple concerns today.  She does suffer from chronic fatigue.  She not sure if it is related to her fibromyalgia but she would like some levels checked.  She has noticed some hair loss over the last year or so.  Just some overall thinning of her hair.  She does not follow a special diet like vegetarian.  She also from time to time will get some itching of her ear canal.  She is to have some type of steroid cream but it is quite old and likely .  She has had high cholesterol in the past and we discussed possibly starting a statin if her levels are high again today.  She will think about it and I will send her results through [email or web address].  She does have some insomnia for which she takes a pretty low dose of trazodone.  She has fibromyalgia and is currently just managing it on her own.  She tried some medications in the past but had adverse side effects.                  Social History     Tobacco Use    Smoking status: Never     Passive exposure: Never    Smokeless tobacco: Never   Substance Use Topics    Alcohol use: No             2023    10:21 AM   Alcohol Use   Prescreen: >3 drinks/day or >7 drinks/week? No     Reviewed orders with patient.   Reviewed health maintenance and updated orders accordingly - Yes  Lab work is in process  Current Outpatient Medications   Medication Sig Dispense Refill    albuterol (PROAIR HFA/PROVENTIL HFA/VENTOLIN HFA) 108 (90 Base) MCG/ACT inhaler Inhale 2 puffs into the lungs every 6 hours as needed for shortness of breath, wheezing or cough 18 g 0    azelastine (ASTELIN) 0.1 % nasal spray Spray 1 spray into both nostrils 2 times daily 30 mL 0    estradiol (ESTRACE) 0.5 MG tablet Take 0.5 mg by mouth      famotidine (PEPCID) 10 MG tablet Take 10 mg by mouth at bedtime As needed      fluticasone-salmeterol (AIRDUO RESPICLICK) 55-14 MCG/ACT inhaler Inhale 1 puff into the lungs 2 times daily 1 each 4    medroxyPROGESTERone (PROVERA) 2.5 MG tablet [MEDROXYPROGESTERONE (PROVERA) 2.5 MG TABLET] TAKE 1 TABLET BY MOUTH DAILY FOR 90 DAYS  0    Melatonin 1 MG CHEW Take 2 mg by mouth As needed      traZODone (DESYREL) 50 MG tablet Take 0.5 tablets (25 mg) by mouth at bedtime 30 tablet 1    triamcinolone (KENALOG) 0.1 % external cream Apply topically 2 times daily As needed for itching of ear canal 30 g 1       Breast Cancer Screenin/6/2023    10:21 AM   Breast CA Risk Assessment (FHS-7)   Do you have a family history of breast, colon, or ovarian cancer? No / Unknown         Mammogram Screening: Recommended mammography every 1-2 years with patient discussion and risk factor consideration  Pertinent mammograms are reviewed under the imaging tab.    History of abnormal Pap smear: NO - age 30-65 PAP every 5 years with negative HPV co-testing recommended      2023     3:23 PM 10/18/2022     3:02 PM   PAP / HPV   PAP Negative for Intraepithelial Lesion or Malignancy (NILM)  Negative for Intraepithelial Lesion or Malignancy (NILM)      Reviewed and updated as needed this visit by clinical staff   Tobacco  Allergies  Meds              Reviewed and updated as needed this visit by Provider                     Review of Systems  "  Gastrointestinal:  Positive for heartburn.   Breasts:  Negative for tenderness, breast mass and discharge.   Genitourinary:  Negative for pelvic pain, vaginal bleeding and vaginal discharge.   Musculoskeletal:  Positive for arthralgias and myalgias.   Neurological:  Positive for weakness.   Psychiatric/Behavioral:  Positive for mood changes.           OBJECTIVE:   BP (!) 141/70   Pulse 75   Temp 98.9  F (37.2  C)   Resp 16   Ht 1.638 m (5' 4.5\")   Wt 80.2 kg (176 lb 12.8 oz)   LMP 09/01/2015 (Within Months)   SpO2 98%   BMI 29.88 kg/m    Physical Exam  GENERAL APPEARANCE: healthy, alert and no distress  EYES: Eyes grossly normal to inspection, PERRL and conjunctivae and sclerae normal  HENT: ear canals and TM's normal, nose and mouth without ulcers or lesions, oropharynx clear and oral mucous membranes moist  NECK: no adenopathy, no asymmetry, masses, or scars and thyroid normal to palpation  RESP: lungs clear to auscultation - no rales, rhonchi or wheezes  CV: regular rate and rhythm, normal S1 S2, no S3 or S4, no murmur, click or rub, no peripheral edema and peripheral pulses strong  ABDOMEN: soft, nontender, no hepatosplenomegaly, no masses and bowel sounds normal  MS: no musculoskeletal defects are noted and gait is age appropriate without ataxia  SKIN: no suspicious lesions or rashes  NEURO: Normal strength and tone, sensory exam grossly normal, mentation intact and speech normal  PSYCH: mentation appears normal and affect normal/bright    Diagnostic Test Results:  Labs reviewed in Epic  No results found for this or any previous visit (from the past 24 hour(s)).    ASSESSMENT/PLAN:   1. Routine general medical examination at a health care facility  Eboni comes in today for annual exam.  As far as healthcare maintenance, her mammogram is up-to-date.  She had a Pap smear this year with her OB/GYN which was normal.  She had a colonoscopy in 2020 which is normal so she does not have to go back until 2030. "  She is up-to-date on immunizations.    2. Insomnia, unspecified type  This is well controlled with a low-dose of trazodone.  - traZODone (DESYREL) 50 MG tablet; Take 0.5 tablets (25 mg) by mouth at bedtime  Dispense: 30 tablet; Refill: 1    3. Essential Hypercholesterolemia  Her cholesterols been quite high in the past.  She admits she has not been eating as well as she has in the past.  We discussed possibly starting a statin if her levels are high again today.  She states that she will think about it.  - Lipid panel reflex to direct LDL Fasting; Future  - Lipid panel reflex to direct LDL Fasting    4. Fibromyalgia  She states she was diagnosed years ago.  She had tried some medications in the past but had adverse side effects.  She feels like she is managing but does have some chronic fatigue which may or may not be related.    5. Chronic fatigue  She has ongoing chronic fatigue and would like to have some additional blood work done to make sure she does not have any deficiencies.  - CBC with platelets; Future  - Comprehensive metabolic panel; Future  - Hemoglobin A1c; Future  - Vitamin D Deficiency; Future  - Vitamin B12; Future  - CBC with platelets  - Comprehensive metabolic panel  - Hemoglobin A1c  - Vitamin D Deficiency  - Vitamin B12    6. Hair loss  Has noticed more hair thinning in the last year.  She is not aware of any family history.  We discussed over-the-counter Rogaine.  - TSH with free T4 reflex; Future  - Ferritin; Future  - TSH with free T4 reflex  - Ferritin    7. Itching of ear  We will send in a steroid cream for her to use as needed to the ear canal.  - triamcinolone (KENALOG) 0.1 % external cream; Apply topically 2 times daily As needed for itching of ear canal  Dispense: 30 g; Refill: 1     Patient has been advised of split billing requirements and indicates understanding: Yes      COUNSELING:  Reviewed preventive health counseling, as reflected in patient instructions       Regular  "exercise       Healthy diet/nutrition      BMI:   Estimated body mass index is 29.88 kg/m  as calculated from the following:    Height as of this encounter: 1.638 m (5' 4.5\").    Weight as of this encounter: 80.2 kg (176 lb 12.8 oz).   Weight management plan: Discussed healthy diet and exercise guidelines      She reports that she has never smoked. She has never been exposed to tobacco smoke. She has never used smokeless tobacco.          Janet Brandt MD  [facility]  "

## 2023-12-07 ENCOUNTER — MYC MEDICAL ADVICE (OUTPATIENT)
Dept: FAMILY MEDICINE | Facility: CLINIC | Age: 63
End: 2023-12-07
Payer: COMMERCIAL

## 2023-12-07 DIAGNOSIS — E78.00 PURE HYPERCHOLESTEROLEMIA: Primary | ICD-10-CM

## 2023-12-07 LAB
ALBUMIN SERPL BCG-MCNC: 4.2 G/DL (ref 3.5–5.2)
ALP SERPL-CCNC: 65 U/L (ref 40–150)
ALT SERPL W P-5'-P-CCNC: 24 U/L (ref 0–50)
ANION GAP SERPL CALCULATED.3IONS-SCNC: 10 MMOL/L (ref 7–15)
AST SERPL W P-5'-P-CCNC: 27 U/L (ref 0–45)
BILIRUB SERPL-MCNC: 0.9 MG/DL
BUN SERPL-MCNC: 12.7 MG/DL (ref 8–23)
CALCIUM SERPL-MCNC: 9.5 MG/DL (ref 8.8–10.2)
CHLORIDE SERPL-SCNC: 104 MMOL/L (ref 98–107)
CHOLEST SERPL-MCNC: 244 MG/DL
CREAT SERPL-MCNC: 0.71 MG/DL (ref 0.51–0.95)
DEPRECATED HCO3 PLAS-SCNC: 26 MMOL/L (ref 22–29)
EGFRCR SERPLBLD CKD-EPI 2021: >90 ML/MIN/1.73M2
FASTING STATUS PATIENT QL REPORTED: YES
FERRITIN SERPL-MCNC: 108 NG/ML (ref 11–328)
GLUCOSE SERPL-MCNC: 82 MG/DL (ref 70–99)
HDLC SERPL-MCNC: 46 MG/DL
LDLC SERPL CALC-MCNC: 183 MG/DL
NONHDLC SERPL-MCNC: 198 MG/DL
POTASSIUM SERPL-SCNC: 4.5 MMOL/L (ref 3.4–5.3)
PROT SERPL-MCNC: 7.3 G/DL (ref 6.4–8.3)
SODIUM SERPL-SCNC: 140 MMOL/L (ref 135–145)
TRIGL SERPL-MCNC: 74 MG/DL
TSH SERPL DL<=0.005 MIU/L-ACNC: 2.2 UIU/ML (ref 0.3–4.2)
VIT B12 SERPL-MCNC: 459 PG/ML (ref 232–1245)
VIT D+METAB SERPL-MCNC: 28 NG/ML (ref 20–50)

## 2023-12-07 RX ORDER — ATORVASTATIN CALCIUM 20 MG/1
20 TABLET, FILM COATED ORAL DAILY
Qty: 90 TABLET | Refills: 3 | Status: SHIPPED | OUTPATIENT
Start: 2023-12-07

## 2024-02-10 ENCOUNTER — TRANSFERRED RECORDS (OUTPATIENT)
Dept: HEALTH INFORMATION MANAGEMENT | Facility: CLINIC | Age: 64
End: 2024-02-10
Payer: COMMERCIAL

## 2024-03-01 ENCOUNTER — OFFICE VISIT (OUTPATIENT)
Dept: PULMONOLOGY | Facility: CLINIC | Age: 64
End: 2024-03-01
Attending: NURSE PRACTITIONER
Payer: COMMERCIAL

## 2024-03-01 VITALS
DIASTOLIC BLOOD PRESSURE: 64 MMHG | OXYGEN SATURATION: 98 % | HEART RATE: 68 BPM | SYSTOLIC BLOOD PRESSURE: 118 MMHG | WEIGHT: 177 LBS | BODY MASS INDEX: 29.91 KG/M2

## 2024-03-01 DIAGNOSIS — R05.3 CHRONIC COUGH: Primary | ICD-10-CM

## 2024-03-01 PROCEDURE — 99213 OFFICE O/P EST LOW 20 MIN: CPT | Performed by: NURSE PRACTITIONER

## 2024-03-01 NOTE — PATIENT INSTRUCTIONS
It was a pleasure to see you in clinic today.   Here is what we discussed:    Okay to stop Airduo, symptoms have resolved.  If symptoms return, or you develop another upper respiratory infection, you can restart the inhaler twice daily until you feel better.  Call my nurse, Deep ([phone]) with any change or worsening of your breathing.  Follow-up as needed.    Raisa Duff, CNP  Pulmonary Medicine  [facility]  [phone]

## 2024-03-01 NOTE — PROGRESS NOTES
Pulmonary Clinic Follow-up          Assessment/Plan:     63 year old female with a history of sinusitis, presenting for three month follow-up of chronic cough.    Chronic cough  Recurrent sinusitis  Lifelong non-smoker presented 11/2023 for evaluation of chronic cough.  She developed URI in September and has been feeling unwell ever since.  She has been treated with azithromycin, methylprednisolone, Azelastine nasal spray without benefit.  She did receive Albuterol and notes this helps with cough.  She is having ongoing coughing, fatigue, muscle aches, swollen lymph nodes in neck.  She is clearing her throat throughout exam, with clear LS and swollen turbinates on left. Pulmonary function testing showed normal spirometry, lung volumes, and diffusion capacity.  Previous visit she was initiated on Airduo, found improvement in cough (although only taking once daily).  Last visit she had another URI and was given augmentin x10 days for sinusitis.  Today she reports complete resolution of symptoms.  She is no longer on Airduo.    Plan:  - ok to stop Airduo, symptoms resolved.  I let her know she can restart this if symptoms return or she develops another URI.  - encouraged saline rinses BID to help with sinus issues and prevent further infections.    Follow-up  - as needed      Raisa Duff, CNP  Pulmonary Medicine  [facility]  [phone]       CC:     Cough follow-up     HPI:     63 year old female with a history of sinusitis, presenting for three month follow-up of chronic cough.    Since last visit, Augmentin helped her sinus symptoms.  Airduo helped, but now off.  Symptoms did not return when she stopped.  No longer has any cough.         ROS:     A 6-system review was obtained and was negative with the exception of the symptoms endorsed in the HPI.       Medical history:       PMH:  No past medical history on file.    PSH:  Past Surgical History:   Procedure Laterality Date     HIATAL HERNIA REPAIR      Z  DELIVERY ONLY      Description:  Section;  Proc Date: 1990;    ZZC  DELIVERY ONLY      Description:  Section;  Proc Date: 1997;    ZZC  DELIVERY ONLY      Description:  Section;  Proc Date: 1999;    ZZC LIGATE FALLOPIAN TUBE      Description: Tubal Ligation;  Proc Date: 1999;       Allergies:  Allergies   Allergen Reactions    Citalopram Unknown    Escitalopram Unknown       Family Hx:  Family History   Problem Relation Age of Onset    Breast Cancer Paternal Aunt     Sleep Apnea Brother     Snoring Brother     Sleep Apnea Brother     Snoring Brother     Celiac Disease Daughter 20.00    Hypertension Father     Cerebrovascular Disease Father 77.00    Cancer Mother        Social Hx:  Social History     Socioeconomic History    Marital status:      Spouse name: Not on file    Number of children: Not on file    Years of education: Not on file    Highest education level: Not on file   Occupational History    Not on file   Tobacco Use    Smoking status: Never     Passive exposure: Never    Smokeless tobacco: Never   Vaping Use    Vaping Use: Never used   Substance and Sexual Activity    Alcohol use: No    Drug use: Never    Sexual activity: Not Currently     Partners: Male   Other Topics Concern    Not on file   Social History Narrative    Patient is  and has 3 children age 31,24,21  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  He did not go to college.  Medical daughter Jaclyn is in 3 M  Older daughter Kaycee Adkins     Coagulant  for hemophi jeanna   Stephen Sanchez MD  3/11/2021                The 10-year ASCVD risk score (Arthur PUCKETT Jr., et al., 2013) is: 3.9%      Values used to calculate the score:        Age: 60 years        Sex: Female        Is Non- : No        Diabetic: No        Tobacco smoker: No        Systolic Blood Pressure: 128  mmHg        Is BP treated: No        HDL Cholesterol: 51 mg/dL        Total Cholesterol: 236 mg/dL     Social Determinants of Health     Financial Resource Strain: Low Risk  (12/6/2023)    Financial Resource Strain     Within the past 12 months, have you or your family members you live with been unable to get utilities (heat, electricity) when it was really needed?: No   Food Insecurity: Low Risk  (12/6/2023)    Food Insecurity     Within the past 12 months, did you worry that your food would run out before you got money to buy more?: No     Within the past 12 months, did the food you bought just not last and you didn t have money to get more?: No   Transportation Needs: Low Risk  (12/6/2023)    Transportation Needs     Within the past 12 months, has lack of transportation kept you from medical appointments, getting your medicines, non-medical meetings or appointments, work, or from getting things that you need?: No   Physical Activity: Not on file   Stress: Not on file   Social Connections: Not on file   Interpersonal Safety: Low Risk  (9/20/2023)    Interpersonal Safety     Do you feel physically and emotionally safe where you currently live?: Yes     Within the past 12 months, have you been hit, slapped, kicked or otherwise physically hurt by someone?: No     Within the past 12 months, have you been humiliated or emotionally abused in other ways by your partner or ex-partner?: No   Housing Stability: Low Risk  (12/6/2023)    Housing Stability     Do you have housing? : Yes     Are you worried about losing your housing?: No       Current Meds:  Current Outpatient Medications   Medication Sig Dispense Refill    albuterol (PROAIR HFA/PROVENTIL HFA/VENTOLIN HFA) 108 (90 Base) MCG/ACT inhaler Inhale 2 puffs into the lungs every 6 hours as needed for shortness of breath, wheezing or cough 18 g 0    atorvastatin (LIPITOR) 20 MG tablet Take 1 tablet (20 mg) by mouth daily 90 tablet 3    azelastine (ASTELIN) 0.1 % nasal  spray Spray 1 spray into both nostrils 2 times daily 30 mL 0    estradiol (ESTRACE) 0.5 MG tablet Take 0.5 mg by mouth      famotidine (PEPCID) 10 MG tablet Take 10 mg by mouth at bedtime As needed      fluticasone-salmeterol (AIRDUO RESPICLICK) 55-14 MCG/ACT inhaler Inhale 1 puff into the lungs 2 times daily 1 each 4    medroxyPROGESTERone (PROVERA) 2.5 MG tablet [MEDROXYPROGESTERONE (PROVERA) 2.5 MG TABLET] TAKE 1 TABLET BY MOUTH DAILY FOR 90 DAYS  0    Melatonin 1 MG CHEW Take 2 mg by mouth As needed      traZODone (DESYREL) 50 MG tablet Take 0.5 tablets (25 mg) by mouth at bedtime 30 tablet 1    triamcinolone (KENALOG) 0.1 % external cream Apply topically 2 times daily As needed for itching of ear canal 30 g 1          Physical Exam:     /64 (BP Location: Left arm, Patient Position: Chair, Cuff Size: Adult Regular)   Pulse 68   Wt 80.3 kg (177 lb)   LMP 09/01/2015 (Within Months)   SpO2 98%   BMI 29.91 kg/m    Gen: adult female , appears in NAD  HEENT: clear conjunctivae  Respirations even and unlabored.  On RA.  No cough.  Skin: no apparent rashes on visible skin  Ext: no cyanosis, clubbing or edema  Neuro: alert and answering questions appropriately       Data:     Labs:  reviewed    Imaging studies:  I have personally reviewed all pertinent imaging studies and PFT results unless otherwise noted.      Pulmonary Function Testing    FEV1/FVC is 85% and is normal.   FEV1 is 2.78L (122%) predicted and is normal.   FVC is 3.27L (114%) predicted and normal.   There  is not improvement in spirometry after a single inhaled dose of bronchodilator.   TLC is 5.20L (104%) predicted and is normal.   RV is 1.89L (96%) predicted and is normal.   DLCO is 24.21ml/min/hg (123%) predicted and is normal when it is corrected for hemoglobin.   Flow volume loops indicate no abnormalities.   Impression:  Full Pulmonary Function Test is normal.  PFTs are consistent with  no  obstructive disease.   Spirometry is not  consistent with reversibility.   There  is not  hyperinflation.   There  is not  air-trapping.   Diffusion capacity when corrected for hemoglobin is normal.   The ATS criteria for acceptability and reproducibility was met.

## 2024-03-02 DIAGNOSIS — G47.00 INSOMNIA, UNSPECIFIED TYPE: ICD-10-CM

## 2024-03-04 ENCOUNTER — NURSE TRIAGE (OUTPATIENT)
Dept: NURSING | Facility: CLINIC | Age: 64
End: 2024-03-04

## 2024-03-04 ENCOUNTER — VIRTUAL VISIT (OUTPATIENT)
Dept: FAMILY MEDICINE | Facility: CLINIC | Age: 64
End: 2024-03-04
Payer: COMMERCIAL

## 2024-03-04 DIAGNOSIS — U07.1 SARS-COV-2 POSITIVE: Primary | ICD-10-CM

## 2024-03-04 PROCEDURE — 99442 PR PHYSICIAN TELEPHONE EVALUATION 11-20 MIN: CPT | Mod: 93 | Performed by: PHYSICIAN ASSISTANT

## 2024-03-04 RX ORDER — TRAZODONE HYDROCHLORIDE 50 MG/1
TABLET, FILM COATED ORAL
Qty: 45 TABLET | Refills: 1 | Status: SHIPPED | OUTPATIENT
Start: 2024-03-04

## 2024-03-04 NOTE — PROGRESS NOTES
Eboni is a 63 year old who is being evaluated via a billable telephone visit.        What phone number would you like to be contacted at? [phone]  How would you like to obtain your AVS? [employer]  Originating Location (pt. Location): Home    Distant Location (provider location):  On-site      Instructions Relayed to Patient by Virtual Roomer:         Reminded patient to ensure they were logged on to virtual visit by arrival time listed. Documented in appointment notes if patient had flexibility to initiate visit sooner than arrival time. If pediatric virtual visit, ensured pediatric patient along with parent/guardian will be present for video visit.     Patient offered the website [email or web address] and/or phone number to [employer] Help line: [phone]     Assessment & Plan     SARS-CoV-2 positive  New   - nirmatrelvir and ritonavir (PAXLOVID) 300 mg/100 mg therapy pack; Take 3 tablets by mouth 2 times daily for 5 days (Take 2 Nirmatrelvir tablets and 1 Ritonavir tablet twice daily for 5 days)  expected course of disease discussed with patient.  Side effects of medications reviewed    If worsening reach out. Ok to use supportive measures as well.  Ok to return to work Thursday if fever free 24+ hours                 Subjective   Eboni is a 63 year old, presenting for the following health issues:  Covid Concern      3/4/2024     7:33 AM   Additional Questions   Roomed by Rosy         3/4/2024     7:33 AM   Patient Reported Additional Medications   Patient reports taking the following new medications none     History of Present Illness       Reason for visit:  Covid  Symptom onset:  1-3 days ago  Symptoms include:  Headache, fever, body aches, cough, congestion  Symptom intensity:  Moderate  Symptom progression:  Staying the same  Had these symptoms before:  No  What makes it worse:  Head  What makes it better:  No    She eats 4 or more servings of fruits and vegetables daily.She consumes 0  sweetened beverage(s) daily.She exercises with enough effort to increase her heart rate 20 to 29 minutes per day.  She exercises with enough effort to increase her heart rate 3 or less days per week.   She is taking medications regularly.           Review of Systems  Constitutional, HEENT, cardiovascular, pulmonary, gi and gu systems are negative, except as otherwise noted.      Objective             Vitals:  No vitals were obtained today due to virtual visit.    Physical Exam   General: Alert and no distress //Respiratory: No audible wheeze, cough, or shortness of breath // Psychiatric:  Appropriate affect, tone, and pace of words          Phone call duration: 13 minutes  Signed Electronically by: Fernandez Gonzalez PA-C

## 2024-03-04 NOTE — TELEPHONE ENCOUNTER
Nurse Triage SBAR    Is this a 2nd Level Triage? NO    Situation: Positive for COVID- requesting to have a virtual visit with a provider - declines nurse protocol     Consent: not needed    Background: positive for COVID yesterday around 3 pm symptoms started on Saturday evening    Assessment:   Fever 101, headache, achy, fatigue, cough, and congestion  Is not up to date on covid vaccine   No chest pain or trouble breathing     Protocol Recommended Disposition:       Recommendation: Offered to have a nurse call her back to talk about Paxlovid- patient declined and preferred to have a virtual visit with a provider- reviewed additional care advice with patient and she verbalizes understanding. Assisted in connecting with scheduling      Scheduling per patient request    Does the patient meet one of the following criteria for ADS visit consideration? 16+ years old, with an [facility] PCP     TIP  Providers, please consider if this condition is appropriate for management at one of our Acute and Diagnostic Services sites.     If patient is a good candidate, please use dotphrase <dot>triageresponse and select Refer to ADS to document.      Nhi Thapa RN 6:56 AM 3/4/2024  Reason for Disposition   [1] HIGH RISK patient (e.g., weak immune system, age > 64 years, obesity with BMI 30 or higher, pregnant, chronic lung disease or other chronic medical condition) AND [2] COVID symptoms (e.g., cough, fever)  (Exceptions: Already seen by PCP and no new or worsening symptoms.)    Additional Information   Negative: SEVERE difficulty breathing (e.g., struggling for each breath, speaks in single words)   Negative: Difficult to awaken or acting confused (e.g., disoriented, slurred speech)   Negative: Bluish (or gray) lips or face now   Negative: Shock suspected (e.g., cold/pale/clammy skin, too weak to stand, low BP, rapid pulse)   Negative: Sounds like a life-threatening emergency to the triager   Negative: [1] Diagnosed or  suspected COVID-19 AND [2] symptoms lasting 3 or more weeks   Negative: [1] COVID-19 exposure AND [2] no symptoms   Negative: COVID-19 vaccine reaction suspected (e.g., fever, headache, muscle aches) occurring 1 to 3 days after getting vaccine   Negative: COVID-19 vaccine, questions about   Negative: [1] Lives with someone known to have influenza (flu test positive) AND [2] flu-like symptoms (e.g., cough, runny nose, sore throat, SOB; with or without fever)   Negative: [1] Possible COVID-19 symptoms AND [2] triager concerned about severity of symptoms or other causes   Negative: COVID-19 and breastfeeding, questions about   Negative: SEVERE or constant chest pain or pressure  (Exception: Mild central chest pain, present only when coughing.)   Negative: MODERATE difficulty breathing (e.g., speaks in phrases, SOB even at rest, pulse 100-120)   Negative: [1] Headache AND [2] stiff neck (can't touch chin to chest)   Negative: Oxygen level (e.g., pulse oximetry) 90 percent or lower   Negative: Chest pain or pressure  (Exception: MILD central chest pain, present only when coughing.)   Negative: [1] Drinking very little AND [2] dehydration suspected (e.g., no urine > 12 hours, very dry mouth, very lightheaded)   Negative: Patient sounds very sick or weak to the triager   Negative: MILD difficulty breathing (e.g., minimal/no SOB at rest, SOB with walking, pulse <100)   Negative: Fever > 103 F (39.4 C)   Negative: [1] Fever > 101 F (38.3 C) AND [2] age > 60 years   Negative: [1] Fever > 100.0 F (37.8 C) AND [2] bedridden (e.g., CVA, chronic illness, recovering from surgery)   Negative: Oxygen level (e.g., pulse oximetry) 91 to 94 percent    Protocols used: Coronavirus (COVID-19) Diagnosed or [id number]

## 2024-03-10 ENCOUNTER — NURSE TRIAGE (OUTPATIENT)
Dept: NURSING | Facility: CLINIC | Age: 64
End: 2024-03-10
Payer: COMMERCIAL

## 2024-03-10 NOTE — TELEPHONE ENCOUNTER
COVID Positive/Requesting COVID treatment    Patient is positive for COVID and requesting treatment options.    Date of positive COVID test (PCR or at home)? 3/3/24   Current COVID symptoms: fever or chills, cough, fatigue, muscle or body aches, headache, new loss of taste or smell, congestion or runny nose, and diarrhea  Date COVID symptoms began: 3/3/24    Message should be routed to clinic RN pool. Best phone number to use for call back: Thurs.  Coughing productive.  Achy.     Pt is fever free.  Home care teaching done.       Reason for Disposition   [1] COVID-19 diagnosed by positive lab test (e.g., PCR, rapid self-test kit) AND [2] mild symptoms (e.g., cough, fever, others) AND [3] no complications or SOB    Additional Information   Negative: SEVERE difficulty breathing (e.g., struggling for each breath, speaks in single words)   Negative: Difficult to awaken or acting confused (e.g., disoriented, slurred speech)   Negative: Bluish (or gray) lips or face now   Negative: Shock suspected (e.g., cold/pale/clammy skin, too weak to stand, low BP, rapid pulse)   Negative: Sounds like a life-threatening emergency to the triager   Negative: [1] Diagnosed or suspected COVID-19 AND [2] symptoms lasting 3 or more weeks   Negative: [1] COVID-19 exposure AND [2] no symptoms   Negative: COVID-19 vaccine reaction suspected (e.g., fever, headache, muscle aches) occurring 1 to 3 days after getting vaccine   Negative: COVID-19 vaccine, questions about   Negative: [1] Lives with someone known to have influenza (flu test positive) AND [2] flu-like symptoms (e.g., cough, runny nose, sore throat, SOB; with or without fever)   Negative: [1] Possible COVID-19 symptoms AND [2] triager concerned about severity of symptoms or other causes   Negative: COVID-19 and breastfeeding, questions about   Negative: SEVERE or constant chest pain or pressure  (Exception: Mild central chest pain, present only when coughing.)   Negative:  MODERATE difficulty breathing (e.g., speaks in phrases, SOB even at rest, pulse 100-120)   Negative: [1] Headache AND [2] stiff neck (can't touch chin to chest)   Negative: Oxygen level (e.g., pulse oximetry) 90 percent or lower   Negative: Chest pain or pressure  (Exception: MILD central chest pain, present only when coughing.)   Negative: [1] Drinking very little AND [2] dehydration suspected (e.g., no urine > 12 hours, very dry mouth, very lightheaded)   Negative: Patient sounds very sick or weak to the triager   Negative: MILD difficulty breathing (e.g., minimal/no SOB at rest, SOB with walking, pulse <100)   Negative: Fever > 103 F (39.4 C)   Negative: [1] Fever > 101 F (38.3 C) AND [2] age > 60 years   Negative: [1] Fever > 100.0 F (37.8 C) AND [2] bedridden (e.g., CVA, chronic illness, recovering from surgery)   Negative: Oxygen level (e.g., pulse oximetry) 91 to 94 percent   Negative: [1] HIGH RISK patient (e.g., weak immune system, age > 64 years, obesity with BMI 30 or higher, pregnant, chronic lung disease or other chronic medical condition) AND [2] COVID symptoms (e.g., cough, fever)  (Exceptions: Already seen by PCP and no new or worsening symptoms.)   Negative: [1] HIGH RISK patient AND [2] influenza is widespread in the community AND [3] ONE OR MORE respiratory symptoms: cough, sore throat, runny or stuffy nose   Negative: Fever present > 3 days (72 hours)   Negative: [1] HIGH RISK patient AND [2] influenza exposure within the last 7 days AND [3] ONE OR MORE respiratory symptoms: cough, sore throat, runny or stuffy nose   Negative: [1] Fever returns after gone for over 24 hours AND [2] symptoms worse or not improved   Negative: [1] Continuous (nonstop) coughing interferes with work or school AND [2] no improvement using cough treatment per Care Advice   Negative: [1] COVID-19 infection suspected by caller or triager AND [2] mild symptoms (cough, fever, or others) AND [3] negative COVID-19 rapid test    Negative: Cough present > 3 weeks   Negative: [1] COVID-19 diagnosed by positive lab test (e.g., PCR, rapid self-test kit) AND [2] NO symptoms (e.g., cough, fever, others)    Protocols used: Coronavirus (COVID-19) Diagnosed or [id number]    Nataliia Haas RN on 3/10/2024 at 1:45 PM

## 2024-07-09 ENCOUNTER — OFFICE VISIT (OUTPATIENT)
Dept: FAMILY MEDICINE | Facility: CLINIC | Age: 64
End: 2024-07-09
Payer: COMMERCIAL

## 2024-07-09 VITALS
WEIGHT: 168.13 LBS | TEMPERATURE: 99.4 F | BODY MASS INDEX: 28.01 KG/M2 | DIASTOLIC BLOOD PRESSURE: 73 MMHG | RESPIRATION RATE: 16 BRPM | HEIGHT: 65 IN | HEART RATE: 65 BPM | SYSTOLIC BLOOD PRESSURE: 111 MMHG | OXYGEN SATURATION: 98 %

## 2024-07-09 DIAGNOSIS — R25.1 TREMOR: Primary | ICD-10-CM

## 2024-07-09 DIAGNOSIS — F39 MOOD DISORDER (H): ICD-10-CM

## 2024-07-09 LAB
ALBUMIN SERPL BCG-MCNC: 4.3 G/DL (ref 3.5–5.2)
ALP SERPL-CCNC: 71 U/L (ref 40–150)
ALT SERPL W P-5'-P-CCNC: 20 U/L (ref 0–50)
ANION GAP SERPL CALCULATED.3IONS-SCNC: 10 MMOL/L (ref 7–15)
AST SERPL W P-5'-P-CCNC: 22 U/L (ref 0–45)
BILIRUB SERPL-MCNC: 1.3 MG/DL
BUN SERPL-MCNC: 13.2 MG/DL (ref 8–23)
CALCIUM SERPL-MCNC: 9.6 MG/DL (ref 8.8–10.2)
CHLORIDE SERPL-SCNC: 107 MMOL/L (ref 98–107)
CREAT SERPL-MCNC: 0.64 MG/DL (ref 0.51–0.95)
DEPRECATED HCO3 PLAS-SCNC: 24 MMOL/L (ref 22–29)
EGFRCR SERPLBLD CKD-EPI 2021: >90 ML/MIN/1.73M2
GLUCOSE SERPL-MCNC: 95 MG/DL (ref 70–99)
POTASSIUM SERPL-SCNC: 4.4 MMOL/L (ref 3.4–5.3)
PROT SERPL-MCNC: 7.6 G/DL (ref 6.4–8.3)
SODIUM SERPL-SCNC: 141 MMOL/L (ref 135–145)
TSH SERPL DL<=0.005 MIU/L-ACNC: 2.32 UIU/ML (ref 0.3–4.2)
VIT B12 SERPL-MCNC: 402 PG/ML (ref 232–1245)
VIT D+METAB SERPL-MCNC: 25 NG/ML (ref 20–50)

## 2024-07-09 PROCEDURE — 84443 ASSAY THYROID STIM HORMONE: CPT

## 2024-07-09 PROCEDURE — 82607 VITAMIN B-12: CPT

## 2024-07-09 PROCEDURE — 99214 OFFICE O/P EST MOD 30 MIN: CPT

## 2024-07-09 PROCEDURE — 36415 COLL VENOUS BLD VENIPUNCTURE: CPT

## 2024-07-09 PROCEDURE — 82306 VITAMIN D 25 HYDROXY: CPT

## 2024-07-09 PROCEDURE — 80053 COMPREHEN METABOLIC PANEL: CPT

## 2024-07-09 RX ORDER — SERTRALINE HYDROCHLORIDE 25 MG/1
25 TABLET, FILM COATED ORAL DAILY
Qty: 90 TABLET | Refills: 3 | Status: SHIPPED | OUTPATIENT
Start: 2024-07-09

## 2024-07-09 NOTE — ASSESSMENT & PLAN NOTE
Patient exhibits a significant degree of anxiety on exam today and notes that mental health has been quite burdensome.  She has a lot of external stressors that have been affecting her executive function and is interested in more aggressive management of her mood today.  We discussed options today; she has a history of fibromyalgia, so we discussed the addition of duloxetine however she is hesitant as it has caused constipation in the past.  Chart review shows that she has been on escitalopram and citalopram with presumed intolerances, however she does not remember exactly.  For these reasons, we have opted to start with a very low-dose of sertraline.  Expected therapeutic effects and potential side effects were discussed today.  I would recommend she follow-up with her primary care provider or myself in 4 to 6 weeks regarding this medication, as tolerability and its efficacy.  Additional medication adjustments at that time if indicated.  Also discussed the utility of things such as talk therapy, regular cardiovascular exercise, good sleep hygiene and healthy eating habits.  Patient expressed understanding of and agreement with this plan.  All questions were answered.

## 2024-07-09 NOTE — ASSESSMENT & PLAN NOTE
Patient presents today to discuss onset of head and upper extremity tremor, worsening over the last 5 to 6 months.  On exam, she demonstrates a gentle resting tremor notable in her head and very subtly in her upper extremities.  It resolves with purposeful movements of her hands.  Her head tremor also completely resolves while in a supine position and a supported head/neck.  We discussed the unclear etiology of symptoms at this point.  She has some characteristics of a benign essential tremor, but also cannot rule out a neurological disease such as Parkinson's variant or [place]'s.  Less likely cervical dystonia given the fact that it does resolve with a supported position.  Will plan to obtain some basic labs today and place a priority referral to neurology given the progressive worsening of symptoms.  She has no history of neurodegenerative disease in herself or family members with the exception of a nephew diagnosed with multiple sclerosis. Will follow up on lab results. Discussed reasons to return to care, specifically any new neurologic symptoms/deficits or worsening of current symptoms. Patient expressed understanding of and agreement with this plan. All questions were answered.

## 2024-07-09 NOTE — PROGRESS NOTES
Assessment & Plan   Problem List Items Addressed This Visit       Tremor - Primary     Patient presents today to discuss onset of head and upper extremity tremor, worsening over the last 5 to 6 months.  On exam, she demonstrates a gentle resting tremor notable in her head and very subtly in her upper extremities.  It resolves with purposeful movements of her hands.  Her head tremor also completely resolves while in a supine position and a supported head/neck.  We discussed the unclear etiology of symptoms at this point.  She has some characteristics of a benign essential tremor, but also cannot rule out a neurological disease such as Parkinson's variant or Janene's.  Less likely cervical dystonia given the fact that it does resolve with a supported position.  Will plan to obtain some basic labs today and place a priority referral to neurology given the progressive worsening of symptoms.  She has no history of neurodegenerative disease in herself or family members with the exception of a nephew diagnosed with multiple sclerosis. Will follow up on lab results. Discussed reasons to return to care, specifically any new neurologic symptoms/deficits or worsening of current symptoms. Patient expressed understanding of and agreement with this plan. All questions were answered.         Relevant Orders    Comprehensive metabolic panel (BMP + Alb, Alk Phos, ALT, AST, Total. Bili, TP)    TSH with free T4 reflex    Adult Neurology  Referral    Vitamin B12    Vitamin D Deficiency    Mood disorder (H24)     Patient exhibits a significant degree of anxiety on exam today and notes that mental health has been quite burdensome.  She has a lot of external stressors that have been affecting her executive function and is interested in more aggressive management of her mood today.  We discussed options today; she has a history of fibromyalgia, so we discussed the addition of duloxetine however she is hesitant as it has  caused constipation in the past.  Chart review shows that she has been on escitalopram and citalopram with presumed intolerances, however she does not remember exactly.  For these reasons, we have opted to start with a very low-dose of sertraline.  Expected therapeutic effects and potential side effects were discussed today.  I would recommend she follow-up with her primary care provider or myself in 4 to 6 weeks regarding this medication, as tolerability and its efficacy.  Additional medication adjustments at that time if indicated.  Also discussed the utility of things such as talk therapy, regular cardiovascular exercise, good sleep hygiene and healthy eating habits.  Patient expressed understanding of and agreement with this plan.  All questions were answered.         Relevant Medications    sertraline (ZOLOFT) 25 MG tablet       Yumiko Lyn is a 63 year old, presenting for the following health issues:  Referral (To Neurologist. Head shaking for one year in last 5 months-worse.)        7/9/2024     8:50 AM   Additional Questions   Roomed by sac   Accompanied by self         7/9/2024     8:50 AM   Patient Reported Additional Medications   Patient reports taking the following new medications no     Office visit to discuss head shaking symptoms. These have been present for the last year or so but seem to have worsened in the last 5-6 months. She is unable to identify any triggering event such as medication changes or specific events/injury. No new medications. Earlier this year, she had a lot of increased stress secondary to selling her home and an injury in her  simultaneously. Has a lot of anxiety related to these symptoms. This is the first time she is seeing someone regarding these symptoms  -Notes head shaking. Some symptoms in upper extremities at rest.  -Exacerbated specifically by stress or anxiety. Does not seem to be worsened with purposeful movements. Caffeine worsens the tremor. The head  "shaking will completely go away when she is lying flat/head supported.  -Alleviated with exercise and relaxing.  -No associated neurologic deficits. No paresthesias. She will occasionally experiencing a 'vibrating' sensation in her chest and lower extremities at night.       History of Present Illness       Reason for visit:  Shaking  Symptom onset:  More than a month  Symptoms include:  Shaking  Symptom intensity:  Mild  Symptom progression:  Staying the same  Had these symptoms before:  No  What makes it worse:  Stress  What makes it better:  Rest    She eats 2-3 servings of fruits and vegetables daily.She consumes 0 sweetened beverage(s) daily.She exercises with enough effort to increase her heart rate 9 or less minutes per day.  She exercises with enough effort to increase her heart rate 3 or less days per week.   She is taking medications regularly.         Objective    /73 (BP Location: Left arm, Patient Position: Sitting, Cuff Size: Adult Large)   Pulse 65   Temp 99.4  F (37.4  C) (Oral)   Resp 16   Ht 1.638 m (5' 4.5\")   Wt 76.3 kg (168 lb 2 oz)   LMP 09/01/2015 (Within Months)   SpO2 98%   BMI 28.41 kg/m    Body mass index is 28.41 kg/m .    Physical Exam  Vitals and nursing note reviewed.   Constitutional:       General: She is not in acute distress.     Appearance: Normal appearance.   HENT:      Head: Normocephalic.   Eyes:      General: No scleral icterus.        Right eye: No discharge.         Left eye: No discharge.      Extraocular Movements: Extraocular movements intact.      Pupils: Pupils are equal, round, and reactive to light.   Cardiovascular:      Rate and Rhythm: Normal rate and regular rhythm.   Pulmonary:      Effort: Pulmonary effort is normal. No respiratory distress.   Musculoskeletal:      Cervical back: Normal range of motion. No tenderness.   Lymphadenopathy:      Cervical: No cervical adenopathy.   Neurological:      Mental Status: She is alert.      Cranial Nerves: " Cranial nerves 2-12 are intact.      Sensory: Sensation is intact.      Motor: Tremor (gentle resting tremor noted in head. Very subtly present to BUEs) present. No weakness, abnormal muscle tone or pronator drift.      Coordination: Coordination is intact. Romberg sign negative. Coordination normal. Finger-Nose-Finger Test normal.      Gait: Gait is intact.      Deep Tendon Reflexes: Reflexes are normal and symmetric.             Signed Electronically by: TYRONE Bunch CNP

## 2024-08-04 ASSESSMENT — ANXIETY QUESTIONNAIRES
2. NOT BEING ABLE TO STOP OR CONTROL WORRYING: NEARLY EVERY DAY
6. BECOMING EASILY ANNOYED OR IRRITABLE: MORE THAN HALF THE DAYS
3. WORRYING TOO MUCH ABOUT DIFFERENT THINGS: NEARLY EVERY DAY
GAD7 TOTAL SCORE: 17
7. FEELING AFRAID AS IF SOMETHING AWFUL MIGHT HAPPEN: NEARLY EVERY DAY
7. FEELING AFRAID AS IF SOMETHING AWFUL MIGHT HAPPEN: NEARLY EVERY DAY
IF YOU CHECKED OFF ANY PROBLEMS ON THIS QUESTIONNAIRE, HOW DIFFICULT HAVE THESE PROBLEMS MADE IT FOR YOU TO DO YOUR WORK, TAKE CARE OF THINGS AT HOME, OR GET ALONG WITH OTHER PEOPLE: NOT DIFFICULT AT ALL
GAD7 TOTAL SCORE: 17
5. BEING SO RESTLESS THAT IT IS HARD TO SIT STILL: NOT AT ALL
8. IF YOU CHECKED OFF ANY PROBLEMS, HOW DIFFICULT HAVE THESE MADE IT FOR YOU TO DO YOUR WORK, TAKE CARE OF THINGS AT HOME, OR GET ALONG WITH OTHER PEOPLE?: NOT DIFFICULT AT ALL
4. TROUBLE RELAXING: NEARLY EVERY DAY
1. FEELING NERVOUS, ANXIOUS, OR ON EDGE: NEARLY EVERY DAY

## 2024-08-07 ENCOUNTER — OFFICE VISIT (OUTPATIENT)
Dept: NEUROLOGY | Facility: CLINIC | Age: 64
End: 2024-08-07
Payer: COMMERCIAL

## 2024-08-07 VITALS — SYSTOLIC BLOOD PRESSURE: 163 MMHG | DIASTOLIC BLOOD PRESSURE: 77 MMHG | HEART RATE: 76 BPM

## 2024-08-07 DIAGNOSIS — G24.3 CERVICAL DYSTONIA: Primary | ICD-10-CM

## 2024-08-07 DIAGNOSIS — R25.1 TREMOR: ICD-10-CM

## 2024-08-07 DIAGNOSIS — F41.9 ANXIETY: ICD-10-CM

## 2024-08-07 DIAGNOSIS — F32.A DEPRESSION, UNSPECIFIED DEPRESSION TYPE: ICD-10-CM

## 2024-08-07 PROCEDURE — 99205 OFFICE O/P NEW HI 60 MIN: CPT | Performed by: PSYCHIATRY & NEUROLOGY

## 2024-08-07 PROCEDURE — G2211 COMPLEX E/M VISIT ADD ON: HCPCS | Performed by: PSYCHIATRY & NEUROLOGY

## 2024-08-07 NOTE — PATIENT INSTRUCTIONS
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 Dystonia is a neurologic disorder in which involuntary muscle contractions cause abnormal movements or postures of your body.  This can affect just certain muscles and we call this focal dystonia or it can effect every muscle in the body and we call this generalized dystonia. Focal dystonia can cause involuntary eye closure called blepharospasm.  It can cause jaw opening or clenching call oromandibular dystonia.  The most common form causes turning or abnormal movement of the neck and this is called cervical dystonia or spasmodic torticollis.  Another form of focal dystonia causes cramps, tightness or pain of the arm with writing, playing a musical instrument or doing other skilled activities. Differentials include paroxysmal dystonia, dopamine responsive dystonia vs paraneoplastic disorder vs Stiff Person Syndrome vs propriospinal myoclonus in setting of described truncal spasms vs possible frontal lobe seizure vs nonepileptic seizure vs conversion disorder vs functional movement disorder. Sanjay's disease ruled out with normal ceruloplasmin and copper levels. Work up is detailed below.     Focal dystonia generally does not respond to medication, but occasionally we will try them.  Trihexyphenidyl (Artane), benztropine (Cogentin), carbidopa/levodopa, amantadine, baclofen or benzodiazepines (clonazepam, diazepam) are most often used.  Each of these may cause side-effects so if they are not greatly helpful they are generally discontinued.    Botulinum toxin injections are the mainstay of treatment.  Botulinum toxin causes temporary weakness of muscles, lessening the abnormal movements and pain caused by dystonia.  Botulinum toxin injections if helpful need to be repeated every three months.    Deep brain stimulation is an emerging therapy for focal dystonia.  It is being applied to an increasing number of patients especially with cervical dystonia.  The results have shown consistent  "improvement, but not complete relief of dystonia.    Plan:   - Appropriate for botulinum toxin treatment for cervical dystonia. We discussed benefits and potential adverse effects of this treatment. Patient is interested and would like to proceed.   - Will submit request for prior authorization for botulinum toxin therapy from insurer.  - Schedule for injection procedure when authorization has been determined.  - Predicted pattern of muscles to be injected: left trap, left splenius capitis, left levator scapulae  - Physical Therapy with neurotoxin injections to improve cervical dystonia   - Therapy referral to manage mood  - No changes to Zoloft for now. SSRIs may exacerbate a focal dystonia, could consider bupropion. Defer to Angella Swift.   - Meditation resources provided  - Consider trying guided meditation. Here are some apps I recommend:.     - Headspace aris (www.headspace.com): 14 day free trial and then there is a monthly or annual rate  - Calm aris (www.calm.com): 7 day free trial and then there is an annual rate  - InsightTimer aris (www.insighttimer.com): FREE   Knowing how to relax is not something our society teaches. In fact, it teaches us to do the opposite. The relaxation response was developed by the [place]  and physician Dr. Jonel Colon. You can google both \"Jonel Colon\" and \"Relaxation Response\" to find a multitude of information. Breath work is the center of this process and can reduce anxiety and stress markedly.     Dr. Colon talks about the need for four things:     1. A quiet environment   2. A comfortable position    3. A passive mental attitude   4. A mental device    The passive mental attitude means that when distracting thoughts come into your mind while deep breathing, you do not  them. Instead of saying to yourself, \"Darn, I just can't calm my mind,\" say, \"Oh, that was interesting,\" and then refocus on the breath. Our brains are a complex network of connections " that were meant to fire. It takes practice, lots of practice to focus and breathe. Try not to  your thoughts as good or bad, they just are.     The mental device can be just about anything. It can be meditation, prayer, saying one sound out loud, whatever you want it to be. The key is to breathe from your belly. Place your hand on your belly. As you inhale to the count of 3 through your nose, feel your hand rising. This helps to remind you to get the breath all the way down into the belly. Exhale to the count of 6 (or double whatever the count of the inhale) through pursed lips. By keeping your lips pursed, the exhale lasts longer and decreases the loss of carbon dioxide. When we breathe fast from our chest, we lose too much carbon dioxide which changes the chemistry of the body. This is what causes many of the physical symptoms of anxiety (tremor, lightheadedness, fast heart rate, ringing in the ear, feeling of unreality).     So, inhale through your nose, think 'All is well,' Exhale through pursed lips, thinking 'This and every day'. Do this twice a day, first thing in the morning and before dinner in the evening. Do it for a minimum of 10 minutes, a maximum of 20 minutes (Dr. Jonel Colon's Relaxation Response). These two phrases are just suggestions. Change it to whatever resonates with you.     Remember, practice this daily, so that you will have this skill when you need it most. Please watch this short 2 minute video: [email or web address]/[email or web address]?[email or web address]     Patient to return in 6 weeks, for in-person visit, 30 minutes for initial neurotoxin injections.
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
## 2024-08-07 NOTE — NURSING NOTE
Chief Complaint   Patient presents with    Tremors     Referred by Angella Swift APRN CNP Phoua Hang, RMA on 8/7/2024 at 12:22 PM
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
## 2024-08-09 ENCOUNTER — OFFICE VISIT (OUTPATIENT)
Dept: FAMILY MEDICINE | Facility: CLINIC | Age: 64
End: 2024-08-09
Payer: COMMERCIAL

## 2024-08-09 VITALS
HEIGHT: 65 IN | BODY MASS INDEX: 28.69 KG/M2 | DIASTOLIC BLOOD PRESSURE: 73 MMHG | HEART RATE: 74 BPM | WEIGHT: 172.2 LBS | TEMPERATURE: 98.9 F | OXYGEN SATURATION: 97 % | SYSTOLIC BLOOD PRESSURE: 129 MMHG | RESPIRATION RATE: 20 BRPM

## 2024-08-09 DIAGNOSIS — F41.9 ANXIETY: Primary | ICD-10-CM

## 2024-08-09 DIAGNOSIS — R25.1 TREMOR: ICD-10-CM

## 2024-08-09 DIAGNOSIS — G24.3 CERVICAL DYSTONIA: ICD-10-CM

## 2024-08-09 DIAGNOSIS — L98.9 SKIN LESION: ICD-10-CM

## 2024-08-09 PROBLEM — K64.9 HEMORRHOIDS: Status: ACTIVE | Noted: 2020-08-17

## 2024-08-09 PROBLEM — K31.7 POLYP OF DUODENUM: Status: ACTIVE | Noted: 2020-08-19

## 2024-08-09 PROBLEM — K44.9 DIAPHRAGMATIC HERNIA: Status: ACTIVE | Noted: 2020-08-17

## 2024-08-09 PROBLEM — K76.0 NONALCOHOLIC FATTY LIVER DISEASE: Status: ACTIVE | Noted: 2022-03-01

## 2024-08-09 PROBLEM — R93.5 ABNORMAL CT OF THE ABDOMEN: Status: ACTIVE | Noted: 2024-08-09

## 2024-08-09 PROBLEM — K57.30 DIVERTICULAR DISEASE OF LARGE INTESTINE: Status: ACTIVE | Noted: 2020-08-17

## 2024-08-09 PROCEDURE — 99214 OFFICE O/P EST MOD 30 MIN: CPT | Performed by: FAMILY MEDICINE

## 2024-08-09 NOTE — PATIENT INSTRUCTIONS
Mental Health referral recommendations:     [facility]:   [facility] [phone]  [address], [place] [address]      [place]:  [place] [facility]: [phone]  [address], [facility] [address]     [facility]: [phone]  [address], [place] [address]     [facility]:  [phone]   [address] [place] [address]     [facility]:  [facility] [phone]   [address]     [place]:  [facility]: [phone]  [address]      [place]:  [employer]  [phone]  [address], [place] [address]     [place]:  [place] [facility] [phone]    [address], [facility]  [address]     [facility] /[phone]   [address], [address]     [facility] [phone] [address], [address]     [facility] [phone]   [facility]     Below are resources in case you experience an increase in symptoms or feel you are in crisis:      Acute Emergency / Crisis  If you are having an acute psychiatric emergency, please call 911 or have someone drive you directly to a hospital for further evaluation.     Mental Health Crisis Lines    [facility]:                Adult Crisis Line ([phone])                      Children's Crisis Line ([phone])  [facility]:  Crisis Connection  ([phone])       Urgent Care   [address] [place] [address]   ([phone])   Provides mental health crisis assessments  Walk-in appointments are available     Additional resources  -Local 24 hour Crisis Line: 1-710.246.7920   -National Suicide Prevention Life Line 8-693-885-TALK (3201), www.suicidepreventionlifeline.org  - National [place] of Mental Illness (www.[place].org) 596.751.5703 or 1-863.530.7147  - Suicide Awareness Voices of Education (SAVE) (www.save.org)  7-005-338-SAVE 7213)   - Substance Abuse and Mental Health Services Administration (SAMHSA) www.samhsa.gov 24 hour helpline: 3-645-914 HELP (5494)  - Narcotics Anonymous (www.[facility].org) 24 hours [facility] Helpline 1-454.329.6690  - [facility] Alcoholic Anonymous [facility] 24 hour phone line 351-614-1661  - Alcoholics Anonymous (www.alcoholics-anonymous.org)  - [place] [facility] Connection ([facility]). [facility] connects people seeking recovery to resources that help foster and sustain long-term recovery. Whether you are seeking resources for treatment, transpiortation, housing, job training, education, health or other pathways to recovery, [facility] is a great place to start. 131.563.4639 www.[place] recovery.org     Health Tips:  - Create a daily schedule  - Eat Healthy  - Do at least one enjoyable activity each day  - Take medications as prescribed  - Get regular sleep at least 8 hours per night  - Practice relaxation  - Spend time with supportive people

## 2024-08-09 NOTE — PROGRESS NOTES
"  Assessment & Plan     ICD-10-CM    1. Anxiety  F41.9       2. Cervical dystonia  G24.3       3. Tremor  R25.1         63-year-old who was diagnosed with cervical dystonia after she was referred to neurology for tremor.  She had been initiated on Zoloft in the neurologist note says SSRIs can exacerbate dystonia.  Patient was also having some acid reflux with Zoloft and has been taking 12.5 mg daily.  Plan is to take 12.5 every other day for 2 weeks and then 2 times a week for 2 weeks and then wean off.  Today we discussed about mental health referral for anxiety and CBT therapy.  Patient already has a referral and has been called by [facility].  I did provide a list of mental health providers in the area if she would like to self schedule.  Regarding treatment of anxiety, a detailed history is taken.  It appears that during time of downsizing and moving to a new house, her  sustained fracture and there was a lot of work to do that triggered this.  Her anxiety was also due to the fact that when she started getting tremors, she thought she was developing Parkinson's.  I do think that CBT therapy would be appropriate at this time and medication can be added.  I will get her clearance from Dr. Azevedo, neurology regarding an exacerbation of dystonia with other medication.  Staff message is being sent.  Her note mentions Wellbutrin but that would not be too helpful if there is underlying anxiety.  Specifically with BuSpar or gabapentin.  This is also discussed with the patient.    Patient had mentioned a skin lesion.  Unfortunately, this was missed during exam today.  I am referring her to dermatology for further evaluation and management since it is new.    BMI  Estimated body mass index is 29.1 kg/m  as calculated from the following:    Height as of this encounter: 1.638 m (5' 4.5\").    Weight as of this encounter: 78.1 kg (172 lb 3.2 oz).         MEDICATIONS:        - Discontinue Zoloft by weaning off " as suggested.       - Continue other medications without change  See Patient Instructions    Subjective   Eboni is a 63 year old, presenting for the following health issues:  Medication Question (Would like to discuss the new dx of cervical dystonia and Zoloft.  ) and Spot on face (Not sure if this is a new spot on face, but wondering if she should go to dermatology)        8/9/2024    10:49 AM   Additional Questions   Roomed by IRINEO Montoya CMA([facility])     History of Present Illness       Mental Health Follow-up:  Patient presents to follow-up on Depression & Anxiety.Patient's depression since last visit has been:  Better  The patient is not having other symptoms associated with depression.  Patient's anxiety since last visit has been:  Better  The patient is not having other symptoms associated with anxiety.  Any significant life events: other  Patient is feeling anxious or having panic attacks.  Patient has no concerns about alcohol or drug use.    She eats 2-3 servings of fruits and vegetables daily.She consumes 0 sweetened beverage(s) daily.She exercises with enough effort to increase her heart rate 9 or less minutes per day.  She exercises with enough effort to increase her heart rate 3 or less days per week.   She is taking medications regularly.     Patient Active Problem List   Diagnosis    Essential Hypercholesterolemia    Fibromyalgia    Tremor    Mood disorder (H24)    Abnormal CT of the abdomen    Diaphragmatic hernia    Diverticular disease of large intestine    Gastroesophageal reflux disease without esophagitis    Hemorrhoids    Nonalcoholic fatty liver disease    Polyp of duodenum     Current Outpatient Medications   Medication Sig Dispense Refill    albuterol (PROAIR HFA/PROVENTIL HFA/VENTOLIN HFA) 108 (90 Base) MCG/ACT inhaler Inhale 2 puffs into the lungs every 6 hours as needed for shortness of breath, wheezing or cough 18 g 0    atorvastatin (LIPITOR) 20 MG tablet Take 1 tablet (20 mg) by mouth  "daily 90 tablet 3    azelastine (ASTELIN) 0.1 % nasal spray Spray 1 spray into both nostrils 2 times daily (Patient taking differently: Spray 1 spray into both nostrils 2 times daily prn) 30 mL 0    estradiol (ESTRACE) 0.5 MG tablet Take 0.5 mg by mouth daily      famotidine (PEPCID) 10 MG tablet Take 10 mg by mouth at bedtime As needed      medroxyPROGESTERone (PROVERA) 2.5 MG tablet [MEDROXYPROGESTERONE (PROVERA) 2.5 MG TABLET] TAKE 1 TABLET BY MOUTH DAILY FOR 90 DAYS  0    sertraline (ZOLOFT) 25 MG tablet Take 1 tablet (25 mg) by mouth daily (Patient taking differently: Take 25 mg by mouth daily TAKE 1/2 A TABLET DAILY) 90 tablet 3    traZODone (DESYREL) 50 MG tablet TAKE 0.5 TABLET (25 MG) BY MOUTH AT BEDTIME 45 tablet 1    triamcinolone (KENALOG) 0.1 % external cream Apply topically 2 times daily As needed for itching of ear canal 30 g 1     Current Facility-Administered Medications   Medication Dose Route Frequency Provider Last Rate Last Admin    Botulinum Toxin Type A (BOTOX) 200 units injection 200 Units  200 Units Intramuscular See Admin Instructions              Review of Systems  Constitutional, HEENT, cardiovascular, pulmonary, GI, , musculoskeletal, neuro, skin, endocrine and psych systems are negative, except as otherwise noted.      Objective    /73   Pulse 74   Temp 98.9  F (37.2  C) (Oral)   Resp 20   Ht 1.638 m (5' 4.5\")   Wt 78.1 kg (172 lb 3.2 oz)   LMP 09/01/2015 (Within Months)   SpO2 97%   BMI 29.10 kg/m    Body mass index is 29.1 kg/m .  Physical Exam   GENERAL: alert and no distress        1/2/2023    11:06 AM   PHQ   PHQ-9 Total Score 12   Q9: Thoughts of better off dead/self-harm past 2 weeks Not at all         9/20/2023     9:20 AM 8/4/2024    12:57 PM   QUANG-7 SCORE   Total Score  17 (severe anxiety)   Total Score 12 17                     Signed Electronically by: Stephen Sanchez MD    "

## 2024-08-12 ENCOUNTER — TELEPHONE (OUTPATIENT)
Dept: NEUROLOGY | Facility: CLINIC | Age: 64
End: 2024-08-12
Payer: COMMERCIAL

## 2024-08-12 NOTE — TELEPHONE ENCOUNTER
Dr. Azevedo,    Pt saw you on 8/7 and you had asked that we schedule the pt for a Botox.     I tried scheduling her but there are no openings. I did try looking on Fridays over your lunch hour too but I can't seem to find an opening.     Per pt is okay seeing you at the [place] location but I don't see any openings there as well.    Can you please take a look at your schedule at both locations ([place] and [facility]") and see where I can schedule her?    Please advise. Thank you.      KHUSHBOO Waldron on 8/12/2024 at 2:30 PM

## 2024-08-12 NOTE — TELEPHONE ENCOUNTER
[facility] Call Center    Phone Message    May a detailed message be left on voicemail: yes     Reason for Call: Patient is calling to speak to nurse, states that she is following up on her botox aris and have not heard from nurse  yet to schedule this     Action Taken: Other: wb neurology    Travel Screening: Not Applicable     Date of Service:

## 2024-08-14 NOTE — TELEPHONE ENCOUNTER
Spoke with Dr. Azevedo. Per her, we can schedule the pt for 9/13 at 12:30 PM. I will send a [employer] message to the pt to let her know.      KHUSHBOO Waldron on 8/14/2024 at 3:28 PM

## 2024-09-13 ENCOUNTER — OFFICE VISIT (OUTPATIENT)
Dept: NEUROLOGY | Facility: CLINIC | Age: 64
End: 2024-09-13
Payer: COMMERCIAL

## 2024-09-13 VITALS — DIASTOLIC BLOOD PRESSURE: 72 MMHG | HEART RATE: 71 BPM | SYSTOLIC BLOOD PRESSURE: 120 MMHG

## 2024-09-13 DIAGNOSIS — G24.3 CERVICAL DYSTONIA: Primary | ICD-10-CM

## 2024-09-13 PROCEDURE — 95874 GUIDE NERV DESTR NEEDLE EMG: CPT | Performed by: PSYCHIATRY & NEUROLOGY

## 2024-09-13 PROCEDURE — 64616 CHEMODENERV MUSC NECK DYSTON: CPT | Mod: LT | Performed by: PSYCHIATRY & NEUROLOGY

## 2024-09-13 NOTE — PROGRESS NOTES
Movement Disorders Botulinum Toxin Clinic Note    History of Present Illness:  Eboni Carter is a 64 year old female who presents to clinic for botulinum toxin injections for treatment of cervical dystonia.  Neurotoxin injections are first line gold standard treatment for focal dystonia. This is her first set of neurotoxin injections at [facility] with this provider for this condition.    Additional interval history: none    Patient denies new medical diagnoses, illnesses, hospitalizations, emergency room visits, and injuries since the previous injection with botulinum neurotoxin.    Physical Examination:  Vital Signs:   blood pressure is 120/72 and her pulse is 71.   Left rotation, right laterocollis, left shoulder elevation  Dystonic no-no head tremor    BOTULINUM NEUROTOXIN INJECTION PROCEDURES:    VERIFICATION OF PATIENT IDENTIFICATION AND PROCEDURE     Initials   Patient Name kd   Patient  kd   Procedure Verified by: sundeep     Prior to the start of the procedure and with procedural staff participation, I verbally confirmed the patient s identity using two indicators, relevant allergies, that the procedure was appropriate and matched the consent or emergent situation, and that the correct equipment/implants were available. Immediately prior to starting the procedure I conducted the Time Out with the procedural staff and re-confirmed the patient s name, procedure, and site/side. (The Joint Commission universal protocol was followed.)  Yes    Sedation (Moderate or Deep): None      Above assessments performed by:    Provider: Natalia Azevedo DO  Movement Disorders Specialist  [facility]       INDICATION/S FOR PROCEDURE/S:  Eboni Carter is a 64 year old patient with dystonia affecting the  head, neck and shoulder girdle musculature secondary to a diagnosis of cervical dystonia with associated  pain, tremor, loss of joint motion, and loss of volitional motor control.     Her  baseline symptoms have been recalcitrant to oral medications and conservative therapy.  She is here today for an injection of Botox.      GOAL OF PROCEDURE:  The goal of this procedure is to increase active range of motion, improve volitional motor control, and decrease pain  associated with dystonic movements.    TOTAL DOSE ADMINISTERED:  Dose Administered:  100 units Botox    Diluent Used:  Preservative Free Normal Saline  Total Volume of Diluent Used:  1 mL/100 units    CONSENT:  The risks, benefits, and treatment options were discussed with Eboni Carter and agreed to proceed.      Written consent was obtained by kd.     EQUIPMENT USED:  Needle-37mm stimulating/recording  EMG/NCS Machine    SKIN PREPARATION:  Skin preparation was performed using an alcohol wipe.    GUIDANCE DESCRIPTION:  Electro-myographic guidance was necessary throughout the procedure to accurately identify all areas of dystonic muscles while avoiding injection of non-dystonic muscles, neighboring nerves and nearby vascular structures.     AREA/MUSCLE INJECTED:  Left rotation, right laterocollis, left shoulder elevation  Muscles Injected Units Injected Number of Injections   Left splenius capitis 50 4   Left splenius cervicis 20 2   Left levator scapulae 10 1   Left longissimus capitis  20 1        Total Units Injected: 100    Unavoidable Waste: 0    Total Units Billed 100    ( ) = previous dose    The patient tolerated the injections without difficulty.    Assessment:    Eboni Carter is a 64 year old female with cervical dystonia.  Today we did repeat botulinum toxin injections. This is her first set of neurotoxin injections at [facility] with this provider for this condition.    Plan  Message via [employer] or call the clinic in 4-6 weeks for an update  Avoid heat and cold pack and massaging the areas injected for 24 hours post injections  Follow-up in 3 and 6 months to consider repeat injections  Physical Therapy referral for  cervical dystonia management

## 2024-09-13 NOTE — NURSING NOTE
Chief Complaint   Patient presents with    Botox       KHUSHBOO Waldron on 9/13/2024 at 12:22 PM

## 2024-09-13 NOTE — LETTER
2024      Eboni Carter  [address]  [place] [address]      Dear Colleague,    Thank you for referring your patient, Eboni Carter, to the [facility]. Please see a copy of my visit note below.    Movement Disorders Botulinum Toxin Clinic Note    History of Present Illness:  Eboni Carter is a 64 year old female who presents to clinic for botulinum toxin injections for treatment of cervical dystonia.  Neurotoxin injections are first line gold standard treatment for focal dystonia. This is her first set of neurotoxin injections at [facility] with this provider for this condition.    Additional interval history: none    Patient denies new medical diagnoses, illnesses, hospitalizations, emergency room visits, and injuries since the previous injection with botulinum neurotoxin.    Physical Examination:  Vital Signs:   blood pressure is 120/72 and her pulse is 71.   Left rotation, right laterocollis, left shoulder elevation  Dystonic no-no head tremor    BOTULINUM NEUROTOXIN INJECTION PROCEDURES:    VERIFICATION OF PATIENT IDENTIFICATION AND PROCEDURE     Initials   Patient Name kd   Patient  kd   Procedure Verified by: sundeep     Prior to the start of the procedure and with procedural staff participation, I verbally confirmed the patient s identity using two indicators, relevant allergies, that the procedure was appropriate and matched the consent or emergent situation, and that the correct equipment/implants were available. Immediately prior to starting the procedure I conducted the Time Out with the procedural staff and re-confirmed the patient s name, procedure, and site/side. (The Joint Commission universal protocol was followed.)  Yes    Sedation (Moderate or Deep): None      Above assessments performed by:    Provider: Natalia Azevedo DO  Movement Disorders Specialist  [facility]       INDICATION/S FOR PROCEDURE/S:  Eboni  Barbara Carter is a 64 year old patient with dystonia affecting the  head, neck and shoulder girdle musculature secondary to a diagnosis of cervical dystonia with associated  pain, tremor, loss of joint motion, and loss of volitional motor control.     Her baseline symptoms have been recalcitrant to oral medications and conservative therapy.  She is here today for an injection of Botox.      GOAL OF PROCEDURE:  The goal of this procedure is to increase active range of motion, improve volitional motor control, and decrease pain  associated with dystonic movements.    TOTAL DOSE ADMINISTERED:  Dose Administered:  100 units Botox    Diluent Used:  Preservative Free Normal Saline  Total Volume of Diluent Used:  1 mL/100 units    CONSENT:  The risks, benefits, and treatment options were discussed with Eboni Carter and agreed to proceed.      Written consent was obtained by sundeep.     EQUIPMENT USED:  Needle-37mm stimulating/recording  EMG/NCS Machine    SKIN PREPARATION:  Skin preparation was performed using an alcohol wipe.    GUIDANCE DESCRIPTION:  Electro-myographic guidance was necessary throughout the procedure to accurately identify all areas of dystonic muscles while avoiding injection of non-dystonic muscles, neighboring nerves and nearby vascular structures.     AREA/MUSCLE INJECTED:  Left rotation, right laterocollis, left shoulder elevation  Muscles Injected Units Injected Number of Injections   Left splenius capitis 50 4   Left splenius cervicis 20 2   Left levator scapulae 10 1   Left longissimus capitis  20 1        Total Units Injected: 100    Unavoidable Waste: 0    Total Units Billed 100    ( ) = previous dose    The patient tolerated the injections without difficulty.    Assessment:    Eboni Carter is a 64 year old female with cervical dystonia.  Today we did repeat botulinum toxin injections. This is her first set of neurotoxin injections at [facility] with this provider for this  condition.    Plan  Message via [employer] or call the clinic in 4-6 weeks for an update  Avoid heat and cold pack and massaging the areas injected for 24 hours post injections  Follow-up in 3 and 6 months to consider repeat injections  Physical Therapy referral for cervical dystonia management            Again, thank you for allowing me to participate in the care of your patient.        Sincerely,        Natalia Azevedo, DO

## 2024-09-13 NOTE — PATIENT INSTRUCTIONS
Plan  Message via [employer] or call the clinic in 4-6 weeks for an update  Avoid heat and cold pack and massaging the areas injected for 24 hours post injections  Follow-up in 3 and 6 months to consider repeat injections  Physical Therapy referral for cervical dystonia management    Botox side effects:    Botox starts to take effect 3-7 days after the injections and peaks 14 days post injections. If side effects occur they typically resolve in weeks to months.     All injections:   Redness/warmth  Swelling  Drainage/infection  Pain/discomfort  Bleeding  Muscle spasms  Muscle pain  flu syndrome, increased cough  Dizziness    Cervical dystonia and oromandibular dystonia injections:    Excessive weakness with holding the head up, or chewing  Trouble swallowing, speaking or breathing  Headache  Rhinitis  Upper respiratory infection  Injections into the levator scapulae may be associated with an  increased risk of upper respiratory infection and dysphagia    Sometimes a new type of achy or sharp pain after botox injection reflects that the injected dose was a bit too high. Not enough to cause weakness, but enough that it can cause cramping in other muscles that are compensating for the botox effects. If this were to occur one would expect it to happen once the botox starts to kick in, not within the first few days. Be reassured that this should improve as the botox wears off and then we will adjust the dosage accordingly at the next appointment.     In rare cases spread of toxin effect can occur. The effect of botulinum toxin may affect areas away from the injection site and cause serious symptoms including: loss of strength and all-over muscle weakness, double vision, blurred vision and drooping eyelids, hoarseness or change or loss of voice, trouble saying words clearly, loss of bladder control, trouble breathing, and trouble swallowing    Information collected from the products insert and BOTOX safety information web  site:  https://media.[employer]/actavis/actavis/media/gvtfdawf-ohm-omvvubhxk/product-prescribing/20190620-BOTOX-100-and-200-Units-[email or web address].pdf    https://www.botoxchronicmigraine.com/botox-chronic-migraine

## 2024-09-18 ENCOUNTER — THERAPY VISIT (OUTPATIENT)
Dept: PHYSICAL THERAPY | Facility: REHABILITATION | Age: 64
End: 2024-09-18
Attending: PSYCHIATRY & NEUROLOGY
Payer: COMMERCIAL

## 2024-09-18 DIAGNOSIS — G24.3 CERVICAL DYSTONIA: ICD-10-CM

## 2024-09-18 PROCEDURE — 97110 THERAPEUTIC EXERCISES: CPT | Mod: GP | Performed by: PHYSICAL THERAPIST

## 2024-09-18 PROCEDURE — 97161 PT EVAL LOW COMPLEX 20 MIN: CPT | Mod: GP | Performed by: PHYSICAL THERAPIST

## 2024-09-18 PROCEDURE — 97140 MANUAL THERAPY 1/> REGIONS: CPT | Mod: GP | Performed by: PHYSICAL THERAPIST

## 2024-09-18 NOTE — PROGRESS NOTES
PHYSICAL THERAPY EVALUATION  Type of Visit: Evaluation       Fall Risk Screen:  Fall screen completed by: PT  Have you fallen 2 or more times in the past year?: No  Have you fallen and had an injury in the past year?: No  Is patient a fall risk?: No    Subjective       Presenting condition or subjective complaint: Cervical Dystonia in my neck  Date of onset: 01/15/24 (Patient notices more shaking in her head as of January 2024)    Relevant medical history:   see problem list  Dates & types of surgery: Meniscus root repair November 2020    Prior diagnostic imaging/testing results:     patient reports cervical MRI through [facility] 2023 not available in EMR.  Prior therapy history for the same diagnosis, illness or injury: Yes 8 shots of Botox in left side of neck on September 13th    Prior Level of Function  Transfers: Independent  Ambulation: Independent  ADL: Independent    Living Environment  Social support: With family members   Type of home: House   Stairs to enter the home: Yes 14 Is there a railing: Yes     Ramp: No   Stairs inside the home: Yes 14 Is there a railing: Yes     Help at home:    Equipment owned:       Employment: Yes  at a   Hobbies/Interests: gardening    Patient goals for therapy: Learn some exercises to control head shaking and help with loosening neck muscles.    Pain assessment: Pain present  See objective evaluation for additional pain details     Objective   CERVICAL SPINE EVALUATION  PAIN: Pain Level at Rest: 3/10  Pain Level with Use: 5/10  Pain Location: cervical spine and left>right  Pain is Exacerbated By: unknown, shaking worse with stress, coffee,   Pain is Relieved By: rest    POSTURE: Sitting Posture: Rounded shoulders, Forward head, Thoracic kyphosis increased    ROM:   (Degrees) Left AROM Right AROM    Cervical Flexion WNL    Cervical Extension 45    Cervical Side bend 15 stiff and sore 15 stiff and sore    Cervical Rotation 40 stiff and sore 55     Cervical Protrusion WNL    Cervical Retraction Moderate loss    Thoracic Flexion     Thoracic Extension     Thoracic Rotation      Pain:   End Feel:     MYOTOMES:    Left Right   C1-2 (Neck Flexion) 5 5   C3 (Neck Side Bend)  5 5   C4 (Shrug) 5 5   C5 (Deltoid) 5 5   C6 (Biceps) 5 5   C7 (Triceps) 5 5   C8 (Thumb Ext) 5 5   T1 (Intrinsics) 5 5     DTR S:    Left Right   C5 (Biceps) 2 2   C6 (Brachioradialis) 1 1   C7 (Triceps) 1 1     Negative Craig bilateral    DERMATOMES: WNL  Cervical    PALPATION:  Increased tension and tenderness noted B: suboccipitals, cervical paraspinals, scalenes, SCM  SPINAL SEGMENTAL CONCLUSIONS:  Hypomobility noted throughout cervical spine      Assessment & Plan   CLINICAL IMPRESSIONS  Medical Diagnosis: cervical dystonia    Treatment Diagnosis: cervical dystonia   Impression/Assessment: Patient is a 64 year old female with cervical dystonia complaints.  The following significant findings have been identified: Pain, Decreased ROM/flexibility, Decreased joint mobility, Impaired muscle performance, Decreased activity tolerance, and Impaired posture. These impairments interfere with their ability to perform self care tasks, work tasks, recreational activities, household chores, and driving  as compared to previous level of function.     Clinical Decision Making (Complexity):  Clinical Presentation: Stable/Uncomplicated  Clinical Presentation Rationale: based on medical and personal factors listed in PT evaluation  Clinical Decision Making (Complexity): Low complexity    PLAN OF CARE  Treatment Interventions:  Interventions: Manual Therapy, Neuromuscular Re-education, Therapeutic Activity, Therapeutic Exercise, Self-Care/Home Management    Long Term Goals     PT Goal 1  Goal Identifier: HEP  Goal Description: Patient will deomstrate independence with HEP and self management of symptoms to facilitate return to PLOF  Rationale: to maximize safety and independence with performance of ADLs  and functional tasks  Goal Progress: initial  Target Date: 11/17/24  PT Goal 2  Goal Identifier: ROM  Goal Description: Patient will demonstrate cervical rotation AROM WNL without pain to facilitate check blind spot when driving.  Rationale: to maximize safety and independence with performance of ADLs and functional tasks  Goal Progress: initial  Target Date: 11/17/24  PT Goal 3  Goal Identifier: Sleep  Goal Description: Patient will sleep without interruption from neck pain.  Rationale: to maximize safety and independence with performance of ADLs and functional tasks  Goal Progress: initial-sleep disturbed 1-2 hours per night  Target Date: 11/17/24      Frequency of Treatment: 1x/week  Duration of Treatment: 60 days    Recommended Referrals to Other Professionals: not at this time  Education Assessment:        Risks and benefits of evaluation/treatment have been explained.   Patient/Family/caregiver agrees with Plan of Care.     Evaluation Time:     PT Eval, Low Complexity Minutes (62493): 30       Signing Clinician: Natalio Lopez PT

## 2024-09-23 ENCOUNTER — TRANSFERRED RECORDS (OUTPATIENT)
Dept: HEALTH INFORMATION MANAGEMENT | Facility: CLINIC | Age: 64
End: 2024-09-23
Payer: COMMERCIAL

## 2024-10-02 ENCOUNTER — THERAPY VISIT (OUTPATIENT)
Dept: PHYSICAL THERAPY | Facility: REHABILITATION | Age: 64
End: 2024-10-02
Attending: PSYCHIATRY & NEUROLOGY
Payer: COMMERCIAL

## 2024-10-02 DIAGNOSIS — G24.3 CERVICAL DYSTONIA: Primary | ICD-10-CM

## 2024-10-02 PROCEDURE — 97140 MANUAL THERAPY 1/> REGIONS: CPT | Mod: GP | Performed by: PHYSICAL THERAPIST

## 2024-10-02 PROCEDURE — 97110 THERAPEUTIC EXERCISES: CPT | Mod: GP | Performed by: PHYSICAL THERAPIST

## 2024-10-09 ENCOUNTER — THERAPY VISIT (OUTPATIENT)
Dept: PHYSICAL THERAPY | Facility: REHABILITATION | Age: 64
End: 2024-10-09
Payer: COMMERCIAL

## 2024-10-09 DIAGNOSIS — G24.3 CERVICAL DYSTONIA: Primary | ICD-10-CM

## 2024-10-09 PROCEDURE — 97140 MANUAL THERAPY 1/> REGIONS: CPT | Mod: GP | Performed by: PHYSICAL THERAPIST

## 2024-10-09 PROCEDURE — 97110 THERAPEUTIC EXERCISES: CPT | Mod: GP | Performed by: PHYSICAL THERAPIST

## 2024-10-16 ENCOUNTER — THERAPY VISIT (OUTPATIENT)
Dept: PHYSICAL THERAPY | Facility: REHABILITATION | Age: 64
End: 2024-10-16
Payer: COMMERCIAL

## 2024-10-16 DIAGNOSIS — G24.3 CERVICAL DYSTONIA: Primary | ICD-10-CM

## 2024-10-16 PROCEDURE — 97140 MANUAL THERAPY 1/> REGIONS: CPT | Mod: GP | Performed by: PHYSICAL THERAPIST

## 2024-10-16 PROCEDURE — 97110 THERAPEUTIC EXERCISES: CPT | Mod: GP | Performed by: PHYSICAL THERAPIST

## 2024-10-21 ENCOUNTER — THERAPY VISIT (OUTPATIENT)
Dept: PHYSICAL THERAPY | Facility: REHABILITATION | Age: 64
End: 2024-10-21
Payer: COMMERCIAL

## 2024-10-21 DIAGNOSIS — G24.3 CERVICAL DYSTONIA: Primary | ICD-10-CM

## 2024-10-21 PROCEDURE — 97110 THERAPEUTIC EXERCISES: CPT | Mod: GP | Performed by: PHYSICAL THERAPIST

## 2024-10-21 PROCEDURE — 97140 MANUAL THERAPY 1/> REGIONS: CPT | Mod: GP | Performed by: PHYSICAL THERAPIST

## 2024-11-01 ENCOUNTER — THERAPY VISIT (OUTPATIENT)
Dept: PHYSICAL THERAPY | Facility: REHABILITATION | Age: 64
End: 2024-11-01
Payer: COMMERCIAL

## 2024-11-01 DIAGNOSIS — G24.3 CERVICAL DYSTONIA: Primary | ICD-10-CM

## 2024-11-01 PROCEDURE — 97110 THERAPEUTIC EXERCISES: CPT | Mod: GP | Performed by: PHYSICAL THERAPIST

## 2024-11-01 PROCEDURE — 97140 MANUAL THERAPY 1/> REGIONS: CPT | Mod: GP | Performed by: PHYSICAL THERAPIST

## 2024-11-03 DIAGNOSIS — G47.00 INSOMNIA, UNSPECIFIED TYPE: ICD-10-CM

## 2024-11-04 RX ORDER — TRAZODONE HYDROCHLORIDE 50 MG/1
TABLET, FILM COATED ORAL
Qty: 45 TABLET | Refills: 1 | Status: SHIPPED | OUTPATIENT
Start: 2024-11-04

## 2024-11-06 ENCOUNTER — PATIENT OUTREACH (OUTPATIENT)
Dept: CARE COORDINATION | Facility: CLINIC | Age: 64
End: 2024-11-06
Payer: COMMERCIAL

## 2024-11-06 ENCOUNTER — LAB REQUISITION (OUTPATIENT)
Dept: LAB | Facility: CLINIC | Age: 64
End: 2024-11-06

## 2024-11-06 DIAGNOSIS — Z13.1 ENCOUNTER FOR SCREENING FOR DIABETES MELLITUS: ICD-10-CM

## 2024-11-06 DIAGNOSIS — Z13.220 ENCOUNTER FOR SCREENING FOR LIPOID DISORDERS: ICD-10-CM

## 2024-11-06 LAB
EST. AVERAGE GLUCOSE BLD GHB EST-MCNC: 108 MG/DL
HBA1C MFR BLD: 5.4 %

## 2024-11-06 PROCEDURE — 83036 HEMOGLOBIN GLYCOSYLATED A1C: CPT | Performed by: OBSTETRICS & GYNECOLOGY

## 2024-11-06 PROCEDURE — 82465 ASSAY BLD/SERUM CHOLESTEROL: CPT | Performed by: OBSTETRICS & GYNECOLOGY

## 2024-11-07 ENCOUNTER — PATIENT OUTREACH (OUTPATIENT)
Dept: CARE COORDINATION | Facility: CLINIC | Age: 64
End: 2024-11-07
Payer: COMMERCIAL

## 2024-11-07 LAB
CHOLEST SERPL-MCNC: 203 MG/DL
FASTING STATUS PATIENT QL REPORTED: NO
HDLC SERPL-MCNC: 52 MG/DL
LDLC SERPL CALC-MCNC: 122 MG/DL
NONHDLC SERPL-MCNC: 151 MG/DL
TRIGL SERPL-MCNC: 145 MG/DL

## 2024-11-15 ENCOUNTER — THERAPY VISIT (OUTPATIENT)
Dept: PHYSICAL THERAPY | Facility: REHABILITATION | Age: 64
End: 2024-11-15
Payer: COMMERCIAL

## 2024-11-15 DIAGNOSIS — G24.3 CERVICAL DYSTONIA: Primary | ICD-10-CM

## 2024-11-15 PROCEDURE — 97110 THERAPEUTIC EXERCISES: CPT | Mod: GP | Performed by: PHYSICAL THERAPIST

## 2024-11-15 PROCEDURE — 97140 MANUAL THERAPY 1/> REGIONS: CPT | Mod: GP | Performed by: PHYSICAL THERAPIST

## 2024-11-20 ENCOUNTER — PATIENT OUTREACH (OUTPATIENT)
Dept: CARE COORDINATION | Facility: CLINIC | Age: 64
End: 2024-11-20
Payer: COMMERCIAL

## 2024-12-28 DIAGNOSIS — E78.00 PURE HYPERCHOLESTEROLEMIA: ICD-10-CM

## 2024-12-31 RX ORDER — ATORVASTATIN CALCIUM 20 MG/1
20 TABLET, FILM COATED ORAL DAILY
Qty: 90 TABLET | Refills: 1 | Status: SHIPPED | OUTPATIENT
Start: 2024-12-31

## 2025-01-06 ENCOUNTER — OFFICE VISIT (OUTPATIENT)
Dept: FAMILY MEDICINE | Facility: CLINIC | Age: 65
End: 2025-01-06
Payer: COMMERCIAL

## 2025-01-06 VITALS
HEIGHT: 65 IN | OXYGEN SATURATION: 99 % | TEMPERATURE: 98.5 F | HEART RATE: 80 BPM | DIASTOLIC BLOOD PRESSURE: 83 MMHG | BODY MASS INDEX: 30.03 KG/M2 | SYSTOLIC BLOOD PRESSURE: 153 MMHG | RESPIRATION RATE: 20 BRPM | WEIGHT: 180.25 LBS

## 2025-01-06 DIAGNOSIS — R31.29 MICROSCOPIC HEMATURIA: ICD-10-CM

## 2025-01-06 DIAGNOSIS — R21 RASH: Primary | ICD-10-CM

## 2025-01-06 DIAGNOSIS — R82.90 BAD ODOR OF URINE: ICD-10-CM

## 2025-01-06 DIAGNOSIS — R03.0 ELEVATED BLOOD PRESSURE READING WITHOUT DIAGNOSIS OF HYPERTENSION: ICD-10-CM

## 2025-01-06 LAB
ALBUMIN UR-MCNC: NEGATIVE MG/DL
APPEARANCE UR: ABNORMAL
BACTERIA #/AREA URNS HPF: ABNORMAL /HPF
BILIRUB UR QL STRIP: NEGATIVE
COLOR UR AUTO: YELLOW
GLUCOSE UR STRIP-MCNC: NEGATIVE MG/DL
HGB UR QL STRIP: ABNORMAL
KETONES UR STRIP-MCNC: NEGATIVE MG/DL
KOH PREPARATION: NORMAL
KOH PREPARATION: NORMAL
LEUKOCYTE ESTERASE UR QL STRIP: NEGATIVE
MUCOUS THREADS #/AREA URNS LPF: PRESENT /LPF
NITRATE UR QL: NEGATIVE
PH UR STRIP: 6.5 [PH] (ref 5–7)
RBC #/AREA URNS AUTO: ABNORMAL /HPF
SP GR UR STRIP: 1.02 (ref 1–1.03)
SQUAMOUS #/AREA URNS AUTO: ABNORMAL /LPF
UROBILINOGEN UR STRIP-ACNC: 0.2 E.U./DL
WBC #/AREA URNS AUTO: ABNORMAL /HPF

## 2025-01-06 PROCEDURE — 90673 RIV3 VACCINE NO PRESERV IM: CPT | Performed by: FAMILY MEDICINE

## 2025-01-06 PROCEDURE — 87205 SMEAR GRAM STAIN: CPT | Performed by: FAMILY MEDICINE

## 2025-01-06 PROCEDURE — 99214 OFFICE O/P EST MOD 30 MIN: CPT | Mod: 25 | Performed by: FAMILY MEDICINE

## 2025-01-06 PROCEDURE — 87220 TISSUE EXAM FOR FUNGI: CPT | Performed by: FAMILY MEDICINE

## 2025-01-06 PROCEDURE — 87070 CULTURE OTHR SPECIMN AEROBIC: CPT | Performed by: FAMILY MEDICINE

## 2025-01-06 PROCEDURE — 90471 IMMUNIZATION ADMIN: CPT | Performed by: FAMILY MEDICINE

## 2025-01-06 PROCEDURE — 81001 URINALYSIS AUTO W/SCOPE: CPT | Performed by: FAMILY MEDICINE

## 2025-01-06 RX ORDER — CLOTRIMAZOLE 1 %
CREAM (GRAM) TOPICAL 2 TIMES DAILY
Qty: 30 G | Refills: 1 | Status: SHIPPED | OUTPATIENT
Start: 2025-01-06

## 2025-01-06 ASSESSMENT — PATIENT HEALTH QUESTIONNAIRE - PHQ9
SUM OF ALL RESPONSES TO PHQ QUESTIONS 1-9: 7
SUM OF ALL RESPONSES TO PHQ QUESTIONS 1-9: 7
10. IF YOU CHECKED OFF ANY PROBLEMS, HOW DIFFICULT HAVE THESE PROBLEMS MADE IT FOR YOU TO DO YOUR WORK, TAKE CARE OF THINGS AT HOME, OR GET ALONG WITH OTHER PEOPLE: SOMEWHAT DIFFICULT

## 2025-01-06 NOTE — PROGRESS NOTES
{PROVIDER CHARTING PREFERENCE:168176}  Hematuria w gyn  Subjective   Eboni is a 64 year old, presenting for the following health issues:  Rash (On outside towards butt)      1/6/2025     3:24 PM   Additional Questions   Roomed by Bharati HERR   Accompanied by self     History of Present Illness       Reason for visit:  Smell in urine and rash on bottom   She is taking medications regularly.     Strong smell of urine for months. No new meds, vitamins, supplements. Movged in June - water is different and not drinking as much. On estrogen patch starting in Nov (was previously on pill). No dysuria, no blood.     This morning, noticed pain on bottom when washing. Looked with mirror - skin was red.     {MA/LPN/RN Pre-Provider Visit Orders- hCG/UA/Strep (Optional):805126}  {SUPERLIST (Optional):185759}  {additonal problems for provider to add (Optional):164268}    {ROS Picklists (Optional):965473}      Objective    LMP 09/01/2015 (Within Months)   There is no height or weight on file to calculate BMI.  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                   Objective    BP (!) 153/83   Pulse 80   Temp 98.5  F (36.9  C) (Oral)   Resp 20   Ht 1.638 m (5' 4.5\")   Wt 81.8 kg (180 lb 4 oz)   LMP 09/01/2015 (Within Months)   SpO2 99%   BMI 30.46 kg/m    Body mass index is 30.46 kg/m .  Physical Exam     Gen: NAD, well appearing  Skin: sharply demarcated erythema of perianal skin. No open lesions, no papules or pustules, no satellite lesions.            Signed Electronically by: Jayla Velazquez MD    "

## 2025-01-08 LAB
BACTERIA SKIN AEROBE CULT: NORMAL
GRAM STAIN RESULT: NORMAL
GRAM STAIN RESULT: NORMAL

## 2025-01-10 ENCOUNTER — OFFICE VISIT (OUTPATIENT)
Dept: PODIATRY | Facility: CLINIC | Age: 65
End: 2025-01-10
Payer: COMMERCIAL

## 2025-01-10 VITALS
DIASTOLIC BLOOD PRESSURE: 84 MMHG | HEIGHT: 65 IN | SYSTOLIC BLOOD PRESSURE: 147 MMHG | WEIGHT: 179 LBS | HEART RATE: 72 BPM | BODY MASS INDEX: 29.82 KG/M2 | OXYGEN SATURATION: 100 %

## 2025-01-10 DIAGNOSIS — M72.2 PLANTAR FASCIITIS, LEFT: Primary | ICD-10-CM

## 2025-01-10 DIAGNOSIS — M76.821 POSTERIOR TIBIAL TENDON DYSFUNCTION (PTTD) OF BOTH LOWER EXTREMITIES: ICD-10-CM

## 2025-01-10 DIAGNOSIS — M76.822 POSTERIOR TIBIAL TENDON DYSFUNCTION (PTTD) OF BOTH LOWER EXTREMITIES: ICD-10-CM

## 2025-01-10 PROCEDURE — 99203 OFFICE O/P NEW LOW 30 MIN: CPT | Performed by: PODIATRIST

## 2025-01-10 ASSESSMENT — PAIN SCALES - GENERAL: PAINLEVEL_OUTOF10: MODERATE PAIN (5)

## 2025-01-10 NOTE — PATIENT INSTRUCTIONS

## 2025-01-10 NOTE — PROGRESS NOTES
PATIENT HISTORY:  Eboni Carter is a 64 year old female who presents to clinic with a chief complaint of a painful left foot.  The patient is seen by themselves.  The patient relates the pain is primarily located around the bottom of the left heel.  Reports insidious onset without acute precipitating event. that has been going on for several week(s).  The patient has previously tried different shoes with little relief. Any previous notes and studies that pertain to the patient's condition were reviewed.    Pertinent medical, surgical and family history was reviewed in the Epic chart.    Past Medical History: No past medical history on file.    Medications:   Current Outpatient Medications:     albuterol (PROAIR HFA/PROVENTIL HFA/VENTOLIN HFA) 108 (90 Base) MCG/ACT inhaler, Inhale 2 puffs into the lungs every 6 hours as needed for shortness of breath, wheezing or cough, Disp: 18 g, Rfl: 0    atorvastatin (LIPITOR) 20 MG tablet, Take 1 tablet (20 mg) by mouth daily., Disp: 90 tablet, Rfl: 1    azelastine (ASTELIN) 0.1 % nasal spray, Spray 1 spray into both nostrils 2 times daily (Patient taking differently: Spray 1 spray into both nostrils 2 times daily. prn), Disp: 30 mL, Rfl: 0    clotrimazole (LOTRIMIN) 1 % external cream, Apply topically 2 times daily., Disp: 30 g, Rfl: 1    estradiol (ESTRACE) 0.5 MG tablet, Take 0.5 mg by mouth daily, Disp: , Rfl:     famotidine (PEPCID) 10 MG tablet, Take 10 mg by mouth at bedtime As needed, Disp: , Rfl:     medroxyPROGESTERone (PROVERA) 2.5 MG tablet, [MEDROXYPROGESTERONE (PROVERA) 2.5 MG TABLET] TAKE 1 TABLET BY MOUTH DAILY FOR 90 DAYS, Disp: , Rfl: 0    sertraline (ZOLOFT) 25 MG tablet, Take 1 tablet (25 mg) by mouth daily (Patient taking differently: Take 25 mg by mouth daily. Take 1/2 a tablet daily), Disp: 90 tablet, Rfl: 3    traZODone (DESYREL) 50 MG tablet, TAKE 0.5 TABLET (25 MG) BY MOUTH AT BEDTIME, Disp: 45 tablet, Rfl: 1    triamcinolone (KENALOG) 0.1 %  "external cream, Apply topically 2 times daily As needed for itching of ear canal, Disp: 30 g, Rfl: 1    Current Facility-Administered Medications:     Botulinum Toxin Type A (BOTOX) 200 units injection 200 Units, 200 Units, Intramuscular, See Admin Instructions, , 120 Units at 12/13/24 1331     Allergies:    Allergies   Allergen Reactions    Citalopram Unknown    Escitalopram Unknown       Vitals: /84   Pulse 72   Ht 1.638 m (5' 4.5\")   Wt 81.2 kg (179 lb)   LMP 09/01/2015 (Within Months)   SpO2 100%   BMI 30.25 kg/m    BMI= Body mass index is 30.25 kg/m .    LOWER EXTREMITY PHYSICAL EXAM    Dermatologic: Skin is intact to left lower extremity without significant lesions, rash or abrasion.        Vascular: DP & PT pulses are intact & regular on the left.   CFT and skin temperature is normal to the left lower extremity.     Neurologic: Lower extremity sensation is intact to light touch.  No evidence of weakness in the left lower extremity.        Musculoskeletal: Patient is ambulatory without assistive device or brace.  No gross ankle deformity noted.  No foot or ankle joint effusion is noted.  Noted pain on palpation on the plantar aspect of the left heel near the insertion point of the plantar fascia.  No surrounding erythema or edema noted.  Noted collapse of the medial longitudinal arch with forefoot abduction bilaterally upon weight bearing exam.  Noted positive Jack's test bilaterally.  Noted tight gastroc complex bilaterally.              ASSESSMENT / PLAN:     ICD-10-CM    1. Plantar fasciitis, left  M72.2 Orthotics, Mastectomy and Custom Compression Orders     CANCELED: Orthotics, Mastectomy and Custom Compression Orders      2. Posterior tibial tendon dysfunction (PTTD) of both lower extremities  M76.821 Orthotics, Mastectomy and Custom Compression Orders    M76.822           I have explained to Eboni Jimenez about the conditions.  We discussed the underlying contributing factors to the condition as " well as treatment options along with expected length of recovery.  At this time, the patient was educated on the importance of offloading supportive shoes and other devices.  I demonstrated to the patient calf stretches to perform every hour daily until symptoms resolve.  After symptoms resolve, the patient was advised to perform the stretches 3 times daily to prevent future recurrence.  The patient was instructed to perform warm soaks with Epson salt after which to also apply over-the-counter Voltaren gel to deeply massage the injured tissue.  The patient was instructed to do this on a daily basis until symptoms resolve.  The patient was prescribed custom orthotics that will aid in offloading the tension forces to the soft tissues and prevent further inflammation.    The patient may return in four weeks for reevaluation to determine if any further treatment will be needed.    Eboni Jimenez verbalized agreement with and understanding of the rational for the diagnosis and treatment plan.  All questions were answered to best of my ability and the patient's satisfaction. The patient was advised to contact the clinic with any questions that may arise after the clinic visit.      Disclaimer: This note consists of symbols derived from keyboarding, dictation and/or voice recognition software. As a result, there may be errors in the script that have gone undetected. Please consider this when interpreting information found in this chart.       GURWINDER Douglas D.P.M., F.KALEIGH.C.F.A.S.

## 2025-01-10 NOTE — LETTER
1/10/2025      Eboni Carter  [address]  [place] [address]      Dear Colleague,    Thank you for referring your patient, Eboni Carter, to the [facility] [place]. Please see a copy of my visit note below.    PATIENT HISTORY:  Eboni Carter is a 64 year old female who presents to clinic with a chief complaint of a painful left foot.  The patient is seen by themselves.  The patient relates the pain is primarily located around the bottom of the left heel.  Reports insidious onset without acute precipitating event. that has been going on for several week(s).  The patient has previously tried different shoes with little relief. Any previous notes and studies that pertain to the patient's condition were reviewed.    Pertinent medical, surgical and family history was reviewed in the [facility] chart.    Past Medical History: No past medical history on file.    Medications:   Current Outpatient Medications:      albuterol (PROAIR HFA/PROVENTIL HFA/VENTOLIN HFA) 108 (90 Base) MCG/ACT inhaler, Inhale 2 puffs into the lungs every 6 hours as needed for shortness of breath, wheezing or cough, Disp: 18 g, Rfl: 0     atorvastatin (LIPITOR) 20 MG tablet, Take 1 tablet (20 mg) by mouth daily., Disp: 90 tablet, Rfl: 1     azelastine (ASTELIN) 0.1 % nasal spray, Spray 1 spray into both nostrils 2 times daily (Patient taking differently: Spray 1 spray into both nostrils 2 times daily. prn), Disp: 30 mL, Rfl: 0     clotrimazole (LOTRIMIN) 1 % external cream, Apply topically 2 times daily., Disp: 30 g, Rfl: 1     estradiol (ESTRACE) 0.5 MG tablet, Take 0.5 mg by mouth daily, Disp: , Rfl:      famotidine (PEPCID) 10 MG tablet, Take 10 mg by mouth at bedtime As needed, Disp: , Rfl:      medroxyPROGESTERone (PROVERA) 2.5 MG tablet, [MEDROXYPROGESTERONE (PROVERA) 2.5 MG TABLET] TAKE 1 TABLET BY MOUTH DAILY FOR 90 DAYS, Disp: , Rfl: 0     sertraline (ZOLOFT) 25 MG tablet, Take 1 tablet (25 mg) by mouth  "daily (Patient taking differently: Take 25 mg by mouth daily. Take 1/2 a tablet daily), Disp: 90 tablet, Rfl: 3     traZODone (DESYREL) 50 MG tablet, TAKE 0.5 TABLET (25 MG) BY MOUTH AT BEDTIME, Disp: 45 tablet, Rfl: 1     triamcinolone (KENALOG) 0.1 % external cream, Apply topically 2 times daily As needed for itching of ear canal, Disp: 30 g, Rfl: 1    Current Facility-Administered Medications:      Botulinum Toxin Type A (BOTOX) 200 units injection 200 Units, 200 Units, Intramuscular, See Admin Instructions, , 120 Units at 12/13/24 1331     Allergies:    Allergies   Allergen Reactions     Citalopram Unknown     Escitalopram Unknown       Vitals: /84   Pulse 72   Ht 1.638 m (5' 4.5\")   Wt 81.2 kg (179 lb)   LMP 09/01/2015 (Within Months)   SpO2 100%   BMI 30.25 kg/m    BMI= Body mass index is 30.25 kg/m .    LOWER EXTREMITY PHYSICAL EXAM    Dermatologic: Skin is intact to left lower extremity without significant lesions, rash or abrasion.        Vascular: DP & PT pulses are intact & regular on the left.   CFT and skin temperature is normal to the left lower extremity.     Neurologic: Lower extremity sensation is intact to light touch.  No evidence of weakness in the left lower extremity.        Musculoskeletal: Patient is ambulatory without assistive device or brace.  No gross ankle deformity noted.  No foot or ankle joint effusion is noted.  Noted pain on palpation on the plantar aspect of the left heel near the insertion point of the plantar fascia.  No surrounding erythema or edema noted.  Noted collapse of the medial longitudinal arch with forefoot abduction bilaterally upon weight bearing exam.  Noted positive Jack's test bilaterally.  Noted tight gastroc complex bilaterally.              ASSESSMENT / PLAN:     ICD-10-CM    1. Plantar fasciitis, left  M72.2 Orthotics, Mastectomy and Custom Compression Orders     CANCELED: Orthotics, Mastectomy and Custom Compression Orders      2. Posterior tibial " tendon dysfunction (PTTD) of both lower extremities  M76.821 Orthotics, Mastectomy and Custom Compression Orders    M76.822           I have explained to Eboni Jimenez about the conditions.  We discussed the underlying contributing factors to the condition as well as treatment options along with expected length of recovery.  At this time, the patient was educated on the importance of offloading supportive shoes and other devices.  I demonstrated to the patient calf stretches to perform every hour daily until symptoms resolve.  After symptoms resolve, the patient was advised to perform the stretches 3 times daily to prevent future recurrence.  The patient was instructed to perform warm soaks with Epson salt after which to also apply over-the-counter Voltaren gel to deeply massage the injured tissue.  The patient was instructed to do this on a daily basis until symptoms resolve.  The patient was prescribed custom orthotics that will aid in offloading the tension forces to the soft tissues and prevent further inflammation.    The patient may return in four weeks for reevaluation to determine if any further treatment will be needed.    Eboni Jimenez verbalized agreement with and understanding of the rational for the diagnosis and treatment plan.  All questions were answered to best of my ability and the patient's satisfaction. The patient was advised to contact the clinic with any questions that may arise after the clinic visit.      Disclaimer: This note consists of symbols derived from keyboarding, dictation and/or voice recognition software. As a result, there may be errors in the script that have gone undetected. Please consider this when interpreting information found in this chart.       GURWINDER Douglas D.P.M., F.A.C.F.A.S.      Again, thank you for allowing me to participate in the care of your patient.        Sincerely,        Ze Douglas DPM    Electronically signed

## 2025-02-18 ENCOUNTER — HOSPITAL ENCOUNTER (OUTPATIENT)
Dept: ULTRASOUND IMAGING | Facility: CLINIC | Age: 65
Discharge: HOME | End: 2025-02-18
Attending: UROLOGY
Payer: COMMERCIAL

## 2025-02-18 DIAGNOSIS — R35.0 URINARY FREQUENCY: ICD-10-CM

## 2025-02-18 DIAGNOSIS — R31.29 MICROHEMATURIA: ICD-10-CM

## 2025-02-18 PROCEDURE — 76770 US EXAM ABDO BACK WALL COMP: CPT

## 2025-03-03 ENCOUNTER — OFFICE VISIT (OUTPATIENT)
Dept: FAMILY MEDICINE | Facility: CLINIC | Age: 65
End: 2025-03-03
Payer: COMMERCIAL

## 2025-03-03 ENCOUNTER — MYC MEDICAL ADVICE (OUTPATIENT)
Dept: FAMILY MEDICINE | Facility: CLINIC | Age: 65
End: 2025-03-03

## 2025-03-03 VITALS
DIASTOLIC BLOOD PRESSURE: 75 MMHG | WEIGHT: 183.4 LBS | HEART RATE: 77 BPM | HEIGHT: 65 IN | OXYGEN SATURATION: 96 % | SYSTOLIC BLOOD PRESSURE: 153 MMHG | RESPIRATION RATE: 16 BRPM | BODY MASS INDEX: 30.56 KG/M2

## 2025-03-03 DIAGNOSIS — Z79.890 ON HORMONE REPLACEMENT THERAPY: ICD-10-CM

## 2025-03-03 DIAGNOSIS — R03.0 ELEVATED BLOOD PRESSURE READING WITHOUT DIAGNOSIS OF HYPERTENSION: ICD-10-CM

## 2025-03-03 DIAGNOSIS — G24.3 CERVICAL DYSTONIA: ICD-10-CM

## 2025-03-03 DIAGNOSIS — F41.9 ANXIETY: Primary | ICD-10-CM

## 2025-03-03 DIAGNOSIS — M54.50 BILATERAL LOW BACK PAIN WITHOUT SCIATICA, UNSPECIFIED CHRONICITY: Primary | ICD-10-CM

## 2025-03-03 PROBLEM — E78.5 HYPERLIPIDEMIA: Status: ACTIVE | Noted: 2024-11-05

## 2025-03-03 PROBLEM — L90.0 LICHEN SCLEROSUS: Status: ACTIVE | Noted: 2025-02-04

## 2025-03-03 PROCEDURE — G2211 COMPLEX E/M VISIT ADD ON: HCPCS | Performed by: FAMILY MEDICINE

## 2025-03-03 PROCEDURE — 99213 OFFICE O/P EST LOW 20 MIN: CPT | Performed by: FAMILY MEDICINE

## 2025-03-03 PROCEDURE — 3077F SYST BP >= 140 MM HG: CPT | Performed by: FAMILY MEDICINE

## 2025-03-03 PROCEDURE — 3078F DIAST BP <80 MM HG: CPT | Performed by: FAMILY MEDICINE

## 2025-03-03 RX ORDER — CLOBETASOL PROPIONATE 0.5 MG/G
OINTMENT TOPICAL
COMMUNITY
Start: 2024-11-06

## 2025-03-03 RX ORDER — ESTRADIOL 0.03 MG/D
FILM, EXTENDED RELEASE TRANSDERMAL
COMMUNITY
Start: 2025-02-05

## 2025-03-03 RX ORDER — ESTRADIOL 0.04 MG/D
PATCH, EXTENDED RELEASE TRANSDERMAL
COMMUNITY
Start: 2025-01-21

## 2025-03-03 RX ORDER — GABAPENTIN 100 MG/1
300 CAPSULE ORAL AT BEDTIME
Qty: 100 CAPSULE | Refills: 1 | Status: SHIPPED | OUTPATIENT
Start: 2025-03-03

## 2025-03-03 RX ORDER — PROGESTERONE 100 MG/1
100 CAPSULE ORAL AT BEDTIME
COMMUNITY

## 2025-03-03 ASSESSMENT — ENCOUNTER SYMPTOMS: BACK PAIN: 1

## 2025-03-03 NOTE — PROGRESS NOTES
"  Assessment & Plan     ICD-10-CM    1. Anxiety  F41.9 gabapentin (NEURONTIN) 100 MG capsule      2. Cervical dystonia  G24.3       3. On hormone replacement therapy  Z79.890       4. Elevated blood pressure reading without diagnosis of hypertension  R03.0         Patient with cervical dystonia and anxiety presents today for follow-up insomnia.  In the past she has been on trazodone.  This made her feel weird and she stopped it.  She thinks it was increasing her anxiety.  Reviewed neurology's previous note again about SSRI sometimes exacerbating dystonia.  Today we discussed about gabapentin that can help with sleep and anxiety and refills were provided  Trial of gabapentin.  Noted that she is wondering about physical therapy of back pain.  Reviewed that she finished physical therapy for her dystonia in November 2024.  Will send a message to patient no further referral is needed and if it is follow-up back pain.  Blood pressure is noted to be elevated today.  Patient is on hormone replacement therapy.  Will continue to monitor.    BMI  Estimated body mass index is 30.99 kg/m  as calculated from the following:    Height as of this encounter: 1.638 m (5' 4.5\").    Weight as of this encounter: 83.2 kg (183 lb 6.4 oz).         MEDICATIONS:   Orders Placed This Encounter   Medications    clobetasol (TEMOVATE) 0.05 % external ointment     Sig: APPLY A THIN LAYER TO THE AFFECTED AREA(S) BY TOPICAL ROUTE 2 TIMES PER WEEK    metroNIDAZOLE (METROCREAM) 0.75 % external cream     Sig: APPLY TO THE FACE TWICE DAILY    progesterone (PROMETRIUM) 100 MG capsule     Sig: Take 100 mg by mouth at bedtime.    estradiol (VIVELLE-DOT) 0.0375 MG/24HR BIW patch     Sig: APPLY 1 PATCH TRANSDERMALLY TWICE A WEEK    estradiol (VIVELLE-DOT) 0.025 MG/24HR bi-weekly patch     Sig: APPLY 1 PATCH TRANSDERMALLY TWICE A WEEK    gabapentin (NEURONTIN) 100 MG capsule     Sig: Take 3 capsules (300 mg) by mouth at bedtime. Start with 1 daily for 3 days "     Dispense:  100 capsule     Refill:  1     Medications Discontinued During This Encounter   Medication Reason    estradiol (ESTRACE) 0.5 MG tablet Therapy completed (No AVS)    medroxyPROGESTERone (PROVERA) 2.5 MG tablet Therapy completed (No AVS)    traZODone (DESYREL) 50 MG tablet Therapy completed (No AVS)    tolterodine ER (DETROL LA) 4 MG 24 hr capsule Therapy completed (No AVS)    sertraline (ZOLOFT) 25 MG tablet Therapy completed (No AVS)          - Continue other medications without change  See Patient Instructions    Subjective   Eboni is a 64 year old, presenting for the following health issues:  Recheck Medication (Med check, pt stopped the trazodone- wants to find something else to help her sleep. Trazodone made her feel weird. ) and Back Pain (Wondering about seeing PT. Referral?)        3/3/2025    10:22 AM   Additional Questions   Roomed by Teodora Forrester CMA     Back Pain         Patient Active Problem List   Diagnosis    Essential Hypercholesterolemia    Fibromyalgia    Tremor    Mood disorder    Abnormal CT of the abdomen    Diaphragmatic hernia    Diverticular disease of large intestine    Gastroesophageal reflux disease without esophagitis    Hemorrhoids    Nonalcoholic fatty liver disease    Polyp of duodenum    Cervical dystonia    Anxiety    Hyperlipidemia    Lichen sclerosus    On hormone replacement therapy    Elevated blood pressure reading without diagnosis of hypertension     Current Outpatient Medications   Medication Sig Dispense Refill    albuterol (PROAIR HFA/PROVENTIL HFA/VENTOLIN HFA) 108 (90 Base) MCG/ACT inhaler Inhale 2 puffs into the lungs every 6 hours as needed for shortness of breath, wheezing or cough 18 g 0    atorvastatin (LIPITOR) 20 MG tablet Take 1 tablet (20 mg) by mouth daily. 90 tablet 1    azelastine (ASTELIN) 0.1 % nasal spray Spray 1 spray into both nostrils 2 times daily 30 mL 0    clobetasol (TEMOVATE) 0.05 % external ointment APPLY A THIN LAYER TO THE  "AFFECTED AREA(S) BY TOPICAL ROUTE 2 TIMES PER WEEK      clotrimazole (LOTRIMIN) 1 % external cream Apply topically 2 times daily. 30 g 1    estradiol (VIVELLE-DOT) 0.025 MG/24HR bi-weekly patch APPLY 1 PATCH TRANSDERMALLY TWICE A WEEK      estradiol (VIVELLE-DOT) 0.0375 MG/24HR BIW patch APPLY 1 PATCH TRANSDERMALLY TWICE A WEEK      famotidine (PEPCID) 10 MG tablet Take 10 mg by mouth at bedtime As needed      gabapentin (NEURONTIN) 100 MG capsule Take 3 capsules (300 mg) by mouth at bedtime. Start with 1 daily for 3 days 100 capsule 1    metroNIDAZOLE (METROCREAM) 0.75 % external cream APPLY TO THE FACE TWICE DAILY      progesterone (PROMETRIUM) 100 MG capsule Take 100 mg by mouth at bedtime.      triamcinolone (KENALOG) 0.1 % external cream Apply topically 2 times daily As needed for itching of ear canal 30 g 1     Current Facility-Administered Medications   Medication Dose Route Frequency Provider Last Rate Last Admin    Botulinum Toxin Type A (BOTOX) 200 units injection 200 Units  200 Units Intramuscular See Admin Instructions    120 Units at 12/13/24 1331         Review of Systems  Constitutional, neuro, ENT, endocrine, pulmonary, cardiac, gastrointestinal, genitourinary, musculoskeletal, integument and psychiatric systems are negative, except as otherwise noted.      Objective    BP (!) 153/75 (BP Location: Left arm, Patient Position: Sitting, Cuff Size: Adult Large)   Pulse 77   Resp 16   Ht 1.638 m (5' 4.5\")   Wt 83.2 kg (183 lb 6.4 oz)   LMP 09/01/2015 (Within Months)   SpO2 96%   BMI 30.99 kg/m    Body mass index is 30.99 kg/m .  Physical Exam   GENERAL: alert and no distress            Signed Electronically by: Stephen Sanchez MD    "

## 2025-03-26 ENCOUNTER — THERAPY VISIT (OUTPATIENT)
Dept: PHYSICAL THERAPY | Facility: REHABILITATION | Age: 65
End: 2025-03-26
Attending: FAMILY MEDICINE
Payer: COMMERCIAL

## 2025-03-26 DIAGNOSIS — M54.50 BILATERAL LOW BACK PAIN WITHOUT SCIATICA, UNSPECIFIED CHRONICITY: ICD-10-CM

## 2025-03-26 PROCEDURE — 97110 THERAPEUTIC EXERCISES: CPT | Mod: GP | Performed by: PHYSICAL THERAPIST

## 2025-03-26 PROCEDURE — 97161 PT EVAL LOW COMPLEX 20 MIN: CPT | Mod: GP | Performed by: PHYSICAL THERAPIST

## 2025-03-26 NOTE — PROGRESS NOTES
PHYSICAL THERAPY EVALUATION  Type of Visit: Evaluation       Fall Risk Screen:  Fall screen completed by: PT  Have you fallen 2 or more times in the past year?: No  Have you fallen and had an injury in the past year?: No  Is patient a fall risk?: No    Subjective         Presenting condition or subjective complaint: Lower back pain and muscle on upper left buttocks    The patient reports that her low back and L gluteal have been bothering her. She had an incident where she was lifting a turkey out of the oven at [facility] and it has been bothering her since then. A couple of months ago the L buttock area are bother.    L buttock and low back area hurts when bending, getting out of bed, getting out of a chair.     Date of onset:      Relevant medical history:     Dates & types of surgery: Knee    Prior diagnostic imaging/testing results:       Prior therapy history for the same diagnosis, illness or injury: No        Living Environment  Social support: With a significant other or spouse   Type of home: House; 2-story   Stairs to enter the home: Yes 13 Is there a railing: Yes     Ramp: No   Stairs inside the home: Yes 13 Is there a railing: Yes     Help at home: None  Equipment owned:       Employment: Yes [employer]  Hobbies/Interests:      Patient goals for therapy: Lift and stand up straight    Pain assessment: See objective evaluation for additional pain details     Objective   LUMBAR SPINE EVALUATION  PAIN: Pain Level at Rest: 2/10  Pain Level with Use: 6/10  Pain Location: lumbar spine  POSTURE: Sitting Posture: Rounded shoulders, Forward head, Thoracic kyphosis increased  GAIT:   Gait Deviations: Antalgic  ROM:  lumbar flexion: reaches knees P+ L buttock, Extension: mod limited P+ L Low back initially, but improved ROM and improved pain with increased reps. Prone on elbows P+ iniitally, but improved with increased duration    MYOTOMES:  5/5 painfree   DERMATOMES: WNL  NEURAL TENSION: negative sciatic n. Tension  cornelia   FLEXIBILITY:   PALPATION:  tender to L PSIS< L glute med and max       Assessment & Plan   CLINICAL IMPRESSIONS  Medical Diagnosis: Left Low Back Pain    Treatment Diagnosis: Left Low Back Pain   Impression/Assessment: Patient is a 64 year old female with low back and L hip complaints.  The following significant findings have been identified: Pain, Decreased ROM/flexibility, Decreased joint mobility, Decreased strength, Inflammation, Impaired gait, Impaired muscle performance, Decreased activity tolerance, and Impaired posture. These impairments interfere with their ability to perform self care tasks, work tasks, recreational activities, household chores, driving , household mobility, and community mobility as compared to previous level of function.     Clinical Decision Making (Complexity):  Clinical Presentation: Stable/Uncomplicated  Clinical Presentation Rationale: based on medical and personal factors listed in PT evaluation  Clinical Decision Making (Complexity): Low complexity    PLAN OF CARE  Treatment Interventions:  Modalities: Cryotherapy, Cupping, Dry Needling, E-stim, Mechanical Traction  Interventions: Gait Training, Manual Therapy, Neuromuscular Re-education, Therapeutic Activity, Therapeutic Exercise, Self-Care/Home Management    Long Term Goals     PT Goal 1  Goal Identifier: Prolonged Positioning.  Goal Description: The patient will be able to sustain a position, either sitting or standing x30 minutes with appropriate posture and pain <3/10.  Rationale: to maximize safety and independence with performance of ADLs and functional tasks;to maximize safety and independence with self cares  Target Date: 05/21/25  PT Goal 2  Goal Description: The patient will be able to ambulate x30 minutes with pain <3/10.  Rationale: to maximize safety and independence with performance of ADLs and functional tasks;to maximize safety and independence within the home;to maximize safety and independence with self  cares  Target Date: 05/21/25      Frequency of Treatment: 1x/week  Duration of Treatment: 6 weeks    Recommended Referrals to Other Professionals:   Education Assessment:   Learner/Method: Patient  Education Comments: Eager to participate in therapy. Patient demonstrates understanding of plan of care and consents to treatment.    Risks and benefits of evaluation/treatment have been explained.   Patient/Family/caregiver agrees with Plan of Care.     Evaluation Time:     PT Eval, Low Complexity Minutes (37261): 15       Signing Clinician: Cherrie Kohli PT

## 2025-03-29 ENCOUNTER — OFFICE VISIT (OUTPATIENT)
Dept: URGENT CARE | Facility: URGENT CARE | Age: 65
End: 2025-03-29
Payer: COMMERCIAL

## 2025-03-29 VITALS
OXYGEN SATURATION: 98 % | WEIGHT: 183.8 LBS | DIASTOLIC BLOOD PRESSURE: 82 MMHG | RESPIRATION RATE: 16 BRPM | BODY MASS INDEX: 31.06 KG/M2 | HEART RATE: 78 BPM | TEMPERATURE: 98.8 F | SYSTOLIC BLOOD PRESSURE: 146 MMHG

## 2025-03-29 DIAGNOSIS — R05.1 ACUTE COUGH: Primary | ICD-10-CM

## 2025-03-29 DIAGNOSIS — J01.00 ACUTE NON-RECURRENT MAXILLARY SINUSITIS: ICD-10-CM

## 2025-03-29 PROCEDURE — 99213 OFFICE O/P EST LOW 20 MIN: CPT | Performed by: STUDENT IN AN ORGANIZED HEALTH CARE EDUCATION/TRAINING PROGRAM

## 2025-03-29 PROCEDURE — 3079F DIAST BP 80-89 MM HG: CPT | Performed by: STUDENT IN AN ORGANIZED HEALTH CARE EDUCATION/TRAINING PROGRAM

## 2025-03-29 PROCEDURE — 3077F SYST BP >= 140 MM HG: CPT | Performed by: STUDENT IN AN ORGANIZED HEALTH CARE EDUCATION/TRAINING PROGRAM

## 2025-03-29 RX ORDER — CODEINE PHOSPHATE AND GUAIFENESIN 10; 100 MG/5ML; MG/5ML
1-2 SOLUTION ORAL EVERY 6 HOURS PRN
Qty: 118 ML | Refills: 0 | Status: SHIPPED | OUTPATIENT
Start: 2025-03-29

## 2025-03-29 RX ORDER — DOXYCYCLINE 100 MG/1
100 CAPSULE ORAL 2 TIMES DAILY
Qty: 20 CAPSULE | Refills: 0 | Status: SHIPPED | OUTPATIENT
Start: 2025-03-29 | End: 2025-04-08

## 2025-03-29 RX ORDER — AZELASTINE 1 MG/ML
1 SPRAY, METERED NASAL 2 TIMES DAILY
Qty: 30 ML | Refills: 0 | Status: SHIPPED | OUTPATIENT
Start: 2025-03-29

## 2025-03-29 RX ORDER — PREDNISONE 20 MG/1
40 TABLET ORAL DAILY
Qty: 10 TABLET | Refills: 0 | Status: SHIPPED | OUTPATIENT
Start: 2025-03-29 | End: 2025-04-03

## 2025-03-29 NOTE — PROGRESS NOTES
Assessment & Plan     Acute cough  Cough for the past month with recent worsening and now having fevers. Doxycycline has worked well in the past for her so will start this for antibiotic and prednisone to treat inflammation in the lungs and cough. Start astelin spray daily and okay to use Robitussin AC as needed at bedtime for cough so she is able to sleep. Follow up if symptoms persist or worsen.    - doxycycline hyclate (VIBRAMYCIN) 100 MG capsule  Dispense: 20 capsule; Refill: 0  - predniSONE (DELTASONE) 20 MG tablet  Dispense: 10 tablet; Refill: 0  - guaiFENesin-codeine (ROBITUSSIN AC) 100-10 MG/5ML solution  Dispense: 118 mL; Refill: 0    Acute non-recurrent maxillary sinusitis  - azelastine (ASTELIN) 0.1 % nasal spray  Dispense: 30 mL; Refill: 0  - predniSONE (DELTASONE) 20 MG tablet  Dispense: 10 tablet; Refill: 0         No follow-ups on file.    [facility]     Eboni is a 64 year old female who presents to clinic today for the following health issues:  Chief Complaint   Patient presents with    Sinus Problem     Sx on and off since beginning of march. Goopy eyes. Bad breath. Post nasal drip. Cough. Headache. Pressure back of head.         3/29/2025     9:03 AM   Additional Questions   Roomed by Kel Randall MA   Accompanied by Self     HPI      Review of Systems  Constitutional, HEENT, cardiovascular, pulmonary, GI, , musculoskeletal, neuro, skin, endocrine and psych systems are negative, except as otherwise noted.      Objective    BP (!) 146/82   Pulse 78   Temp 98.8  F (37.1  C) (Oral)   Resp 16   Wt 83.4 kg (183 lb 12.8 oz)   LMP 09/01/2015 (Within Months)   SpO2 98%   BMI 31.06 kg/m    Physical Exam   GENERAL: alert and no distress  EYES: Eyes grossly normal to inspection, PERRL and conjunctivae and sclerae normal  HENT: normal cephalic/atraumatic, both ears: clear effusion, nasal mucosa edematous , rhinorrhea yellow, oral  mucous membranes moist, and sinuses: maxillary tenderness on bilaterally, maxillary swelling on bilaterally  RESP: lungs clear to auscultation - no rales, rhonchi or wheezes  CV: regular rate and rhythm, normal S1 S2, no S3 or S4, no murmur, click or rub  MS: no gross musculoskeletal defects noted, no edema  SKIN: no suspicious lesions or rashes  NEURO: Normal strength and tone, mentation intact and speech normal  PSYCH: mentation appears normal, affect normal/bright    No results found for this or any previous visit (from the past 24 hours).

## 2025-03-29 NOTE — PROGRESS NOTES
Urgent Care Clinic Visit    Chief Complaint   Patient presents with    Sinus Problem     Sx on and off since beginning of march. Goopy eyes. Bad breath. Post nasal drip. Cough. Headache. Pressure back of head.              3/29/2025     9:03 AM   Additional Questions   Roomed by Kel Randall MA   Accompanied by Self         Kel Randall MA on 3/29/2025 at 9:05 AM

## 2025-04-04 ENCOUNTER — OFFICE VISIT (OUTPATIENT)
Dept: FAMILY MEDICINE | Facility: CLINIC | Age: 65
End: 2025-04-04
Payer: COMMERCIAL

## 2025-04-04 VITALS
DIASTOLIC BLOOD PRESSURE: 75 MMHG | HEIGHT: 64 IN | BODY MASS INDEX: 31.58 KG/M2 | OXYGEN SATURATION: 97 % | RESPIRATION RATE: 19 BRPM | HEART RATE: 86 BPM | TEMPERATURE: 98.2 F | WEIGHT: 185 LBS | SYSTOLIC BLOOD PRESSURE: 144 MMHG

## 2025-04-04 DIAGNOSIS — J02.9 SORE THROAT: Primary | ICD-10-CM

## 2025-04-04 DIAGNOSIS — R52 ACHES: ICD-10-CM

## 2025-04-04 DIAGNOSIS — R63.5 WEIGHT GAIN: ICD-10-CM

## 2025-04-04 LAB
DEPRECATED S PYO AG THROAT QL EIA: NEGATIVE
S PYO DNA THROAT QL NAA+PROBE: NOT DETECTED
TSH SERPL DL<=0.005 MIU/L-ACNC: 3.06 UIU/ML (ref 0.3–4.2)

## 2025-04-04 PROCEDURE — 3077F SYST BP >= 140 MM HG: CPT | Performed by: FAMILY MEDICINE

## 2025-04-04 PROCEDURE — 99213 OFFICE O/P EST LOW 20 MIN: CPT | Performed by: FAMILY MEDICINE

## 2025-04-04 PROCEDURE — 1126F AMNT PAIN NOTED NONE PRSNT: CPT | Performed by: FAMILY MEDICINE

## 2025-04-04 PROCEDURE — 84443 ASSAY THYROID STIM HORMONE: CPT | Performed by: FAMILY MEDICINE

## 2025-04-04 PROCEDURE — 36415 COLL VENOUS BLD VENIPUNCTURE: CPT | Performed by: FAMILY MEDICINE

## 2025-04-04 PROCEDURE — 3078F DIAST BP <80 MM HG: CPT | Performed by: FAMILY MEDICINE

## 2025-04-04 PROCEDURE — 87651 STREP A DNA AMP PROBE: CPT | Performed by: FAMILY MEDICINE

## 2025-04-04 ASSESSMENT — PAIN SCALES - GENERAL: PAINLEVEL_OUTOF10: NO PAIN (0)

## 2025-04-04 NOTE — PROGRESS NOTES
Assessment & Plan   Eboni was previously seen in urgent care for cough and diagnosed with sinusitis.  She has finished the prescribed doxycycline.  She was also given a burst treatment with prednisone but developed increased aches after the prednisone was stopped.  However she does note that her aches and some fatigue preceded the onset of her illness.  She does have difficulty sleeping and is working on this with her neurologist    Sore throat  - Streptococcus A Rapid Screen w/Reflex to PCR - Clinic Collect  - Group A Streptococcus PCR Throat Swab  negative    Weight gain  Aches  Evaluate with   - TSH with free T4 reflex - TSH 3.06    Return if symptoms worsen or fail to improve.      Subjective   Eboni is a 64 year old, presenting for the following health issues:  URI (Not feeling well all of March. No treatment has worked thus far. Possible chest xray.)      4/4/2025     1:02 PM   Additional Questions   Roomed by Ruth Ann MAYBERRY    History of Present Illness       Reason for visit:  Connecting to feel sick while on antibiotics    She eats 2-3 servings of fruits and vegetables daily.She consumes 0 sweetened beverage(s) daily.She exercises with enough effort to increase her heart rate 9 or less minutes per day.  She exercises with enough effort to increase her heart rate 3 or less days per week.   She is taking medications regularly.      Eboni has been sick or all of March .  On March 1 she developed a sore throat and then that developed into the a cough.  She says she dealt with her symptoms for the first 2 weeks and by the third week was feeling worse so she went into urgent care.  She came in just with symptoms of cough and sore throat.  She also notes that she has not been achy, but this preceded the onset of her illness in March.  These aches are not limited to her shoulder girdle or hips.    3/29/25  Assessment & Plan  Acute cough  Cough for the past month with recent worsening and now having fevers.  "Doxycycline has worked well in the past for her so will start this for antibiotic and prednisone to treat inflammation in the lungs and cough. Start astelin spray daily and okay to use Robitussin AC as needed at bedtime for cough so she is able to sleep. Follow up if symptoms persist or worsen.    - doxycycline hyclate (VIBRAMYCIN) 100 MG capsule  Dispense: 20 capsule; Refill: 0  - predniSONE (DELTASONE) 20 MG tablet  Dispense: 10 tablet; Refill: 0  - guaiFENesin-codeine (ROBITUSSIN AC) 100-10 MG/5ML solution  Dispense: 118 mL; Refill: 0     Acute non-recurrent maxillary sinusitis  - azelastine (ASTELIN) 0.1 % nasal spray  Dispense: 30 mL; Refill: 0  - predniSONE (DELTASONE) 20 MG tablet  Dispense: 10 tablet; Refill: 0    --  She felt better after taking the doxycycline and prednisone, but when her prednisone therapy was finished her body aches returned and are \"so bad.\"  She also feels fatigued, cold, and continues to have a cough .     Works at a     Wt Readings from Last 5 Encounters:   04/04/25 83.9 kg (185 lb)   03/29/25 83.4 kg (183 lb 12.8 oz)   03/03/25 83.2 kg (183 lb 6.4 oz)   02/14/25 82.6 kg (182 lb)   01/10/25 81.2 kg (179 lb)     She had her TSH checked about 9 months ago and workup of a tremor caused by cervical dystonia.  It was normal at the time.    She has some difficulties with sleep: Goes to bed at 10 PM, with wakes up usually twice at midnight and then at 4.  And she gets up at 5 or 6.  She is working with her neurologist on sleep management and therapy.    Hemoglobin   Date Value Ref Range Status   12/06/2023 15.2 11.7 - 15.7 g/dL Final             Objective    BP (!) 145/85 (BP Location: Left arm, Patient Position: Sitting, Cuff Size: Adult Large)   Pulse 86   Temp 98.2  F (36.8  C) (Temporal)   Resp 19   Ht 1.638 m (5' 4.49\")   Wt 83.9 kg (185 lb)   LMP 09/01/2015 (Approximate)   SpO2 97%   BMI 31.28 kg/m    Body mass index is 31.28 kg/m .  Physical Exam   GENERAL: alert and " no distress  EYES: Eyes grossly normal to inspection, PERRL and conjunctivae and sclerae normal  HENT: ear canals and TM's normal, nose and mouth without ulcers or lesions  NECK: no adenopathy  RESP: lungs clear to auscultation - no rales, rhonchi or wheezes  CV: regular rate and rhythm, normal S1 S2, no S3 or S4, no click or rub,   2/6 heart murmur best heard at pex            Signed Electronically by: Lucero Aguilar MD

## 2025-04-08 ENCOUNTER — OFFICE VISIT (OUTPATIENT)
Dept: FAMILY MEDICINE | Facility: CLINIC | Age: 65
End: 2025-04-08
Payer: COMMERCIAL

## 2025-04-08 VITALS
BODY MASS INDEX: 29.79 KG/M2 | RESPIRATION RATE: 18 BRPM | DIASTOLIC BLOOD PRESSURE: 70 MMHG | TEMPERATURE: 98.9 F | HEART RATE: 81 BPM | HEIGHT: 65 IN | WEIGHT: 178.8 LBS | SYSTOLIC BLOOD PRESSURE: 146 MMHG | OXYGEN SATURATION: 97 %

## 2025-04-08 DIAGNOSIS — R52 BODY ACHES: Primary | ICD-10-CM

## 2025-04-08 DIAGNOSIS — R50.9 FEVER, UNSPECIFIED FEVER CAUSE: ICD-10-CM

## 2025-04-08 LAB
ALBUMIN UR-MCNC: 30 MG/DL
APPEARANCE UR: CLEAR
BACTERIA #/AREA URNS HPF: ABNORMAL /HPF
BILIRUB UR QL STRIP: ABNORMAL
COLOR UR AUTO: YELLOW
FLUAV AG SPEC QL IA: NEGATIVE
FLUBV AG SPEC QL IA: NEGATIVE
GLUCOSE UR STRIP-MCNC: NEGATIVE MG/DL
HGB UR QL STRIP: ABNORMAL
KETONES UR STRIP-MCNC: NEGATIVE MG/DL
LEUKOCYTE ESTERASE UR QL STRIP: NEGATIVE
NITRATE UR QL: NEGATIVE
PH UR STRIP: 6 [PH] (ref 5–8)
RBC #/AREA URNS AUTO: ABNORMAL /HPF
SP GR UR STRIP: 1.02 (ref 1–1.03)
SQUAMOUS #/AREA URNS AUTO: ABNORMAL /LPF
UROBILINOGEN UR STRIP-ACNC: 0.2 E.U./DL
WBC #/AREA URNS AUTO: ABNORMAL /HPF

## 2025-04-08 PROCEDURE — 3077F SYST BP >= 140 MM HG: CPT | Performed by: PHYSICIAN ASSISTANT

## 2025-04-08 PROCEDURE — 81001 URINALYSIS AUTO W/SCOPE: CPT | Performed by: PHYSICIAN ASSISTANT

## 2025-04-08 PROCEDURE — G2211 COMPLEX E/M VISIT ADD ON: HCPCS | Performed by: PHYSICIAN ASSISTANT

## 2025-04-08 PROCEDURE — 3078F DIAST BP <80 MM HG: CPT | Performed by: PHYSICIAN ASSISTANT

## 2025-04-08 PROCEDURE — 99213 OFFICE O/P EST LOW 20 MIN: CPT | Performed by: PHYSICIAN ASSISTANT

## 2025-04-08 PROCEDURE — 87804 INFLUENZA ASSAY W/OPTIC: CPT | Performed by: PHYSICIAN ASSISTANT

## 2025-04-08 PROCEDURE — 87635 SARS-COV-2 COVID-19 AMP PRB: CPT | Performed by: PHYSICIAN ASSISTANT

## 2025-04-08 RX ORDER — MEDROXYPROGESTERONE ACETATE 2.5 MG/1
1 TABLET ORAL DAILY
COMMUNITY
Start: 2025-03-18

## 2025-04-08 NOTE — RESULT ENCOUNTER NOTE
Patient requested phone call:    UA negative for infection but does have blood present in urine, recommend follow up with PCP to address repeat urine.    Flu testing negative, COVID will return tomorrow.    Minerva Lira PA-C

## 2025-04-08 NOTE — PROGRESS NOTES
"  Assessment & Plan     Body aches  Fever, unspecified fever cause  - Influenza A & B Antigen - Clinic Collect  - COVID-19 Virus (Coronavirus) by PCR Nose  - UA Macroscopic with reflex to Microscopic and Culture - Clinic Collect  - UA Microscopic with Reflex to Culture  New problem, discussed with patient that illness is likely viral in etiology as flu testing was negative, COVID pending and UA negative. Recommend rest, fluids and over the counter medications.  Advised follow up if symptoms worsen or do not alleviate or improve.          BMI  Estimated body mass index is 30.22 kg/m  as calculated from the following:    Height as of this encounter: 1.638 m (5' 4.5\").    Weight as of this encounter: 81.1 kg (178 lb 12.8 oz).   Weight management plan: Discussed healthy diet and exercise guidelines      Risks, benefits and alternatives were discussed with patient. Agreeable to the plan of care.      Yumiko Lyn is a 64 year old, presenting for the following health issues:  Generalized Body Aches (Body aches, decreased appetite, fever, chills, fatigue, no energy, emotional. Hx of recent sinus inf and on going cough.  )      4/8/2025     8:48 AM   Additional Questions   Roomed by IRINEO Montoya CMA([facility])     History of Present Illness       Reason for visit:  Connecting to feel sick while on antibiotics    She eats 2-3 servings of fruits and vegetables daily.She consumes 0 sweetened beverage(s) daily.She exercises with enough effort to increase her heart rate 9 or less minutes per day.  She exercises with enough effort to increase her heart rate 3 or less days per week.   She is taking medications regularly.        Patient is here today concerned about not feeling well since Friday. She notes she was in bed all day Friday and has felt fairly ill over the weekend.  Notes bodyaches, no headache, very tired  Fever she believes about 100 at home, Friday night was the worst night and did not take temperature.  No nausea, no " "vomiting, just decreased appetite  Has had ongoing sinus infection, is currently on Doxycycline  + cough with production  No wheezing or short of breath  She does feel some improvement but still feeling terrible  Taking no OTC medication as it makes her stomach upset      Review of Systems  Constitutional, HEENT, cardiovascular, pulmonary, gi and gu systems are negative, except as otherwise noted.      Objective    BP (!) 146/70   Pulse 81   Temp 98.9  F (37.2  C) (Oral)   Resp 18   Ht 1.638 m (5' 4.5\")   Wt 81.1 kg (178 lb 12.8 oz)   LMP 09/01/2015 (Approximate)   SpO2 97%   BMI 30.22 kg/m    Body mass index is 30.22 kg/m .  Physical Exam   GENERAL: alert and no distress  EYES: Eyes grossly normal to inspection, PERRL and conjunctivae and sclerae normal  HENT: ear canals and TM's normal, nose and mouth without ulcers or lesions  NECK: no adenopathy, no asymmetry, masses, or scars  RESP: lungs clear to auscultation - no rales, rhonchi or wheezes  CV: regular rate and rhythm, normal S1 S2, no S3 or S4, no murmur, click or rub, no peripheral edema  ABDOMEN: soft, nontender, no hepatosplenomegaly, no masses and bowel sounds normal  MS: no gross musculoskeletal defects noted, no edema        Labs: COVID pending.  Flu: negative  UA: negative    Signed Electronically by: Minerva Lira PA-C    "

## 2025-04-09 LAB — SARS-COV-2 RNA RESP QL NAA+PROBE: NEGATIVE

## 2025-04-10 ENCOUNTER — OFFICE VISIT (OUTPATIENT)
Dept: FAMILY MEDICINE | Facility: CLINIC | Age: 65
End: 2025-04-10
Payer: COMMERCIAL

## 2025-04-10 VITALS
HEART RATE: 94 BPM | OXYGEN SATURATION: 100 % | DIASTOLIC BLOOD PRESSURE: 70 MMHG | RESPIRATION RATE: 16 BRPM | WEIGHT: 180 LBS | HEIGHT: 65 IN | BODY MASS INDEX: 29.99 KG/M2 | SYSTOLIC BLOOD PRESSURE: 136 MMHG | TEMPERATURE: 97.5 F

## 2025-04-10 DIAGNOSIS — R21 MACULAR RASH: ICD-10-CM

## 2025-04-10 DIAGNOSIS — J01.90 ACUTE SINUSITIS, RECURRENCE NOT SPECIFIED, UNSPECIFIED LOCATION: Primary | ICD-10-CM

## 2025-04-10 LAB
ERYTHROCYTE [DISTWIDTH] IN BLOOD BY AUTOMATED COUNT: 11.7 % (ref 10–15)
ERYTHROCYTE [SEDIMENTATION RATE] IN BLOOD BY WESTERGREN METHOD: 20 MM/HR (ref 0–30)
HCT VFR BLD AUTO: 41 % (ref 35–47)
HGB BLD-MCNC: 14.4 G/DL (ref 11.7–15.7)
MCH RBC QN AUTO: 30.6 PG (ref 26.5–33)
MCHC RBC AUTO-ENTMCNC: 35.1 G/DL (ref 31.5–36.5)
MCV RBC AUTO: 87 FL (ref 78–100)
MONOCYTES NFR BLD AUTO: NEGATIVE %
PLATELET # BLD AUTO: 193 10E3/UL (ref 150–450)
RBC # BLD AUTO: 4.71 10E6/UL (ref 3.8–5.2)
WBC # BLD AUTO: 7.3 10E3/UL (ref 4–11)

## 2025-04-10 RX ORDER — CEFPODOXIME PROXETIL 200 MG/1
200 TABLET, FILM COATED ORAL 2 TIMES DAILY
Qty: 14 TABLET | Refills: 0 | Status: SHIPPED | OUTPATIENT
Start: 2025-04-10 | End: 2025-04-17

## 2025-04-10 NOTE — PROGRESS NOTES
Assessment/Plan:    Acute sinusitis, recurrence not specified, unspecified location  Macular rash  Overall suspect illness started with a virus - likely this is the cause of her rash (discussed possibility of doxycycline related but this seems less likely as she has used this medication numerous times in the past without issues). Possible bacterial sinus infection now - will treat with cephalosporin as below. Will check some labs as below given severity of sx and new rash.  - cefpodoxime (VANTIN) 200 MG tablet  Dispense: 14 tablet; Refill: 0  - CBC with platelets  - Mononucleosis screen  - Comprehensive metabolic panel (BMP + Alb, Alk Phos, ALT, AST, Total. Bili, TP)  - ESR: Erythrocyte sedimentation rate  - CRP, inflammation       Follow up: as needed    Katherine Lucas MD  [facility]    Subjective:   Eboni Carter is a 64 year old female is here today for illness/rash    3/29 UC - cough - doxy, prednisone  4/4 OV - similar sx/not really feeling better - strep neg, TSH nml  4/8 OV - similar sx/not feeling better - flu/COVID neg, UA neg infection but some RBC  4/9 mychart message - rash/hives on legs    -was really sick Friday/Sat/Sun, thought maybe was influenza  -thinks all related to sinus infection  -rash on extremities  - itchy - red sunburn like rash - no bumps or hives - a couple nights ago it started on feet  -no known allergies  -no hx similar illness/rash  -finished doxy a few day ago/early this week - has had doxy in the past with no issues    Answers submitted by the patient for this visit:  General Questionnaire (Submitted on 4/4/2025)  Chief Complaint: Chronic problems general questions HPI Form  What is the reason for your visit today? : Connecting to feel sick while on antibiotics  How many servings of fruits and vegetables do you eat daily?: 2-3  On average, how many sweetened beverages do you drink each day (Examples: soda, juice, sweet tea, etc.  Do NOT count diet or  artificially sweetened beverages)?: 0  How many minutes a day do you exercise enough to make your heart beat faster?: 9 or less  How many days a week do you exercise enough to make your heart beat faster?: 3 or less  How many days per week do you miss taking your medication?: 0  Questionnaire about: Chronic problems general questions HPI Form (Submitted on 2025)  Chief Complaint: Chronic problems general questions HPI Form    Patient Active Problem List   Diagnosis    Essential Hypercholesterolemia    Fibromyalgia    Tremor    Mood disorder    Abnormal CT of the abdomen    Diaphragmatic hernia    Diverticular disease of large intestine    Gastroesophageal reflux disease without esophagitis    Hemorrhoids    Nonalcoholic fatty liver disease    Polyp of duodenum    Cervical dystonia    Anxiety    Hyperlipidemia    Lichen sclerosus    On hormone replacement therapy    Elevated blood pressure reading without diagnosis of hypertension     History reviewed. No pertinent past medical history.  Past Surgical History:   Procedure Laterality Date    HIATAL HERNIA REPAIR      [facility]  DELIVERY ONLY      Description:  Section;  Proc Date: 1990;    [facility]  DELIVERY ONLY      Description:  Section;  Proc Date: 1997;    [facility]  DELIVERY ONLY      Description:  Section;  Proc Date: 1999;    [facility] LIGATE FALLOPIAN TUBE      Description: Tubal Ligation;  Proc Date: 1999;     Current Outpatient Medications   Medication Sig Dispense Refill    albuterol (PROAIR HFA/PROVENTIL HFA/VENTOLIN HFA) 108 (90 Base) MCG/ACT inhaler Inhale 2 puffs into the lungs every 6 hours as needed for shortness of breath, wheezing or cough 18 g 0    atorvastatin (LIPITOR) 20 MG tablet Take 1 tablet (20 mg) by mouth daily. 90 tablet 1    azelastine (ASTELIN) 0.1 % nasal spray Spray 1 spray into both nostrils 2 times daily. 30 mL 0    cefpodoxime (VANTIN) 200 MG tablet Take 1 tablet (200 mg) by  mouth 2 times daily for 7 days. 14 tablet 0    clobetasol (TEMOVATE) 0.05 % external ointment APPLY A THIN LAYER TO THE AFFECTED AREA(S) BY TOPICAL ROUTE 2 TIMES PER WEEK      clotrimazole (LOTRIMIN) 1 % external cream Apply topically 2 times daily. 30 g 1    estradiol (VIVELLE-DOT) 0.0375 MG/24HR BIW patch APPLY 1 PATCH TRANSDERMALLY TWICE A WEEK      famotidine (PEPCID) 10 MG tablet Take 10 mg by mouth at bedtime As needed      gabapentin (NEURONTIN) 100 MG capsule Take 3 capsules (300 mg) by mouth at bedtime. Start with 1 daily for 3 days 100 capsule 1    guaiFENesin-codeine (ROBITUSSIN AC) 100-10 MG/5ML solution Take 5-10 mLs by mouth every 6 hours as needed for cough. 118 mL 0    medroxyPROGESTERone (PROVERA) 2.5 MG tablet Take 1 tablet by mouth daily.      metroNIDAZOLE (METROCREAM) 0.75 % external cream APPLY TO THE FACE TWICE DAILY      triamcinolone (KENALOG) 0.1 % external cream Apply topically 2 times daily As needed for itching of ear canal 30 g 1    azelastine (ASTELIN) 0.1 % nasal spray Spray 1 spray into both nostrils 2 times daily (Patient not taking: Reported on 4/10/2025) 30 mL 0     Current Facility-Administered Medications   Medication Dose Route Frequency Provider Last Rate Last Admin    Botulinum Toxin Type A (BOTOX) 200 units injection 200 Units  200 Units Intramuscular See Admin Instructions    185 Units at 03/14/25 0721     Allergies   Allergen Reactions    Citalopram Unknown    Escitalopram Unknown     Social History     Socioeconomic History    Marital status:      Spouse name: Not on file    Number of children: Not on file    Years of education: Not on file    Highest education level: Not on file   Occupational History    Not on file   Tobacco Use    Smoking status: Never     Passive exposure: Never    Smokeless tobacco: Never   Vaping Use    Vaping status: Never Used   Substance and Sexual Activity    Alcohol use: No    Drug use: Never    Sexual activity: Not Currently      Partners: Male   Other Topics Concern    Not on file   Social History Narrative    Patient is  and has 3 children age 31,24,21  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  He did not go to college.  Medical daughter Jaclyn is in 3 M  Older daughter Kaycee Adkins     Coagulant  for hemmorena meeks   Stephen Sanchez MD  3/11/2021                The 10-year ASCVD risk score ([place] PUCKETT Jr., et al., 2013) is: 3.9%      Values used to calculate the score:        Age: 60 years        Sex: Female        Is Non- : No        Diabetic: No        Tobacco smoker: No        Systolic Blood Pressure: 128 mmHg        Is BP treated: No        HDL Cholesterol: 51 mg/dL        Total Cholesterol: 236 mg/dL     Social Drivers of Health     Financial Resource Strain: Low Risk  (12/6/2023)    Financial Resource Strain     Within the past 12 months, have you or your family members you live with been unable to get utilities (heat, electricity) when it was really needed?: No   Food Insecurity: Low Risk  (12/6/2023)    Food Insecurity     Within the past 12 months, did you worry that your food would run out before you got money to buy more?: No     Within the past 12 months, did the food you bought just not last and you didn t have money to get more?: No   Transportation Needs: Low Risk  (12/6/2023)    Transportation Needs     Within the past 12 months, has lack of transportation kept you from medical appointments, getting your medicines, non-medical meetings or appointments, work, or from getting things that you need?: No   Physical Activity: Not on file   Stress: Not on file   Social Connections: Unknown (9/2/2022)    [facility]    Social Connections     Frequency of Communication with Friends and Family: Not on file   Interpersonal Safety: Low Risk  (1/6/2025)    Interpersonal  "Safety     Do you feel physically and emotionally safe where you currently live?: Yes     Within the past 12 months, have you been hit, slapped, kicked or otherwise physically hurt by someone?: No     Within the past 12 months, have you been humiliated or emotionally abused in other ways by your partner or ex-partner?: No   Housing Stability: Low Risk  (12/6/2023)    Housing Stability     Do you have housing? : Yes     Are you worried about losing your housing?: No     Family History   Problem Relation Age of Onset    Breast Cancer Paternal Aunt     Sleep Apnea Brother     Snoring Brother     Sleep Apnea Brother     Snoring Brother     Celiac Disease Daughter 20.00    Hypertension Father     Cerebrovascular Disease Father 77.00    Cancer Mother      Review of systems is as stated in HPI, and the remainder of system review is otherwise negative.    Objective:     /70 (BP Location: Left arm, Patient Position: Sitting, Cuff Size: Adult Large)   Pulse 94   Temp 97.5  F (36.4  C) (Temporal)   Resp 16   Ht 1.638 m (5' 4.5\")   Wt 81.6 kg (180 lb)   LMP 09/01/2015 (Approximate)   SpO2 100%   BMI 30.42 kg/m      General appearance: awake, NAD  HEENT: atraumatic, normocephalic, PERRL, no scleral icterus or injection, Tms normal bilaterally  CV: RRR, no murmurs/rubs/gallops, normal S1 and S2  Lungs: CTAB, no wheezes or crackles, breathing comfortably on room air, no cough  Extremities: no LE edema bilaterally, moving all extremities  Skin: faint macular rash on arms and legs, erythematous, itchy  Neuro: alert, oriented x3, CNs grossly intact, no focal deficits appreciated  Psych: normal mood/affect/behavior, answering questions appropriately, linear thought process    "

## 2025-04-16 ENCOUNTER — THERAPY VISIT (OUTPATIENT)
Dept: PHYSICAL THERAPY | Facility: REHABILITATION | Age: 65
End: 2025-04-16
Payer: COMMERCIAL

## 2025-04-16 DIAGNOSIS — M54.50 BILATERAL LOW BACK PAIN WITHOUT SCIATICA, UNSPECIFIED CHRONICITY: Primary | ICD-10-CM

## 2025-04-16 PROCEDURE — 97140 MANUAL THERAPY 1/> REGIONS: CPT | Mod: GP | Performed by: PHYSICAL THERAPIST

## 2025-04-16 PROCEDURE — 97110 THERAPEUTIC EXERCISES: CPT | Mod: GP | Performed by: PHYSICAL THERAPIST

## 2025-04-16 NOTE — PROGRESS NOTES
04/16/25 0500   Appointment Info   Signing clinician's name / credentials Cherrie Kohli, PT DPT   Total/Authorized Visits E&T   Visits Used 2   Medical Diagnosis Left Low Back Pain   PT Tx Diagnosis Left Low Back Pain   Progress Note/Certification   Therapy Frequency 1x/week   Predicted Duration 6 weeks   Progress Note Completed Date 03/26/25   GOALS   PT Goals 2   PT Goal 1   Goal Identifier Prolonged Positioning.   Goal Description The patient will be able to sustain a position, either sitting or standing x30 minutes with appropriate posture and pain <3/10.   Rationale to maximize safety and independence with performance of ADLs and functional tasks;to maximize safety and independence with self cares   Goal Progress goal met   Target Date 05/21/25   PT Goal 2   Goal Identifier walking   Goal Description The patient will be able to ambulate x30 minutes with pain <3/10.   Rationale to maximize safety and independence with performance of ADLs and functional tasks;to maximize safety and independence within the home;to maximize safety and independence with self cares   Goal Progress goal met.   Target Date 05/21/25   Subjective Report   Subjective Report the patient reports that she is feeling much much better than she used to. Is no long experiencing the sharp pains in her. Has been using her lumbar roll   Objective Measures   Objective Measures Objective Measure 1;Objective Measure 2   Objective Measure 1   Objective Measure Pain Rating   Objective Measure 2   Objective Measure MATHEW:   Details 30% improved to 10% on 4/16/25   Treatment Interventions (PT)   Interventions Therapeutic Procedure/Exercise;Manual Therapy   Therapeutic Procedure/Exercise   Therapeutic Procedures: strength, endurance, ROM, flexibility minutes (34301) 23   Therapeutic Procedures Ther Proc 2;Ther Proc 3   Ther Proc 3 Directional Preference Education   Ther Proc 3 - Details Educated patient regarding directional preference in  "relationship to low back pain. Educated regarding bent finger analogy and about temporarily avoiding movement in the opposite direction of their directional preference.   PTRx Ther Proc 1 Repeated Hip External Rotation with Overpressure in Sitting   PTRx Ther Proc 1 - Details 3x30\" holds    PTRx Ther Proc 2 Prone On Elbows   PTRx Ther Proc 2 - Details x10   PTRx Ther Proc 3 Prone Press Ups   PTRx Ther Proc 3 - Details x10   PTRx Ther Proc 4 Standing Extension   PTRx Ther Proc 4 - Details x10   PTRx Ther Proc 5 Bridging #1   PTRx Ther Proc 5 - Details x10   Skilled Intervention educated and initiated HEP, exercise selection and monitoring of patient to ensure safe performance.   Patient Response/Progress improved pain following   PTRx Ther Proc 6 Supine Abdominal Exercise #3 (Marching)   PTRx Ther Proc 6 - Details x10   Therapeutic Activity   PTRx Ther Act 1 Neutral Spine Standing   PTRx Ther Act 1 - Details No Notes   Manual Therapy   Manual Therapy: Mobilization, MFR, MLD, friction massage minutes (24759) 15   Manual Therapy 1 Prone PA grade II to lumbar region   Manual Therapy 1 - Details MFR layer 2 to L>R gluteals in prone   Patient Response/Progress tolerated  to L piriformis   Education   Learner/Method Patient   Education Comments Eager to participate in therapy. Patient demonstrates understanding of plan of care and consents to treatment.   Plan   Home program see PTRX on phone   Plan for next session review HEP< progress core strength, MT as indicated   Comments   Comments The patient has attended PT consistently and is demonstrating improvements in pain, mobility and function. At this point goals have been met and they feel comfortable continuing with independent management of their condition. Advised the patient to return to therapy as needed in the future and to continue with prescribed HEP. The patient will be discharged at this time.   Total Session Time   Timed Code Treatment Minutes 38 "   Total Treatment Time (sum of timed and untimed services) 38           DISCHARGE  Reason for Discharge: Patient has met all goals.      Discharge Plan: Patient to continue home program.    Referring Provider:  Stephen Sanchez

## 2025-04-23 ENCOUNTER — OFFICE VISIT (OUTPATIENT)
Dept: OTOLARYNGOLOGY | Facility: CLINIC | Age: 65
End: 2025-04-23
Attending: FAMILY MEDICINE
Payer: COMMERCIAL

## 2025-04-23 VITALS — HEART RATE: 76 BPM | OXYGEN SATURATION: 99 % | SYSTOLIC BLOOD PRESSURE: 152 MMHG | DIASTOLIC BLOOD PRESSURE: 72 MMHG

## 2025-04-23 DIAGNOSIS — J01.90 ACUTE SINUSITIS, RECURRENCE NOT SPECIFIED, UNSPECIFIED LOCATION: ICD-10-CM

## 2025-04-23 DIAGNOSIS — J01.01 ACUTE RECURRENT MAXILLARY SINUSITIS: Primary | ICD-10-CM

## 2025-04-23 RX ORDER — PREDNISONE 20 MG/1
TABLET ORAL
Qty: 16 TABLET | Refills: 1 | Status: SHIPPED | OUTPATIENT
Start: 2025-04-23

## 2025-04-23 NOTE — PROGRESS NOTES
I am seeing this patient in consultation for acute sinusitis at the request of the provider Katherine Forrester      Chief Complaint - sinusitis    History of Present Illness - Eboni Carter is a 64 year old female who presents for evaluation of possible chronic sinusitis. The patient describes symptoms of feeling like she has the flu, pressure in head, eyes gooping, headache, light sensitivity, cough, green mucous, for the past 2 months. Presently, the patient is is better. These symptoms have recurred about once a year. No allergy symptoms. Treatments have included astelin, antibiotics (doxycycline and vantin), nasal steroids (flonase gives her a bloody nose), nasal saline irrigations (distilled water), prednisone (helped the most). The treatments seem to help. No prior history of sinus surgery.     Tests personally reviewed today for this visit:   1.) CRP 6 on 4/10/25  2.)sed rate normal 4/10/25  3.) CBC normal 4/10/25  4.) mono negative 4/10/25  5.) CMP normal 4/10/25    Past Medical History -   Patient Active Problem List   Diagnosis    Essential Hypercholesterolemia    Fibromyalgia    Tremor    Mood disorder    Abnormal CT of the abdomen    Diaphragmatic hernia    Diverticular disease of large intestine    Gastroesophageal reflux disease without esophagitis    Hemorrhoids    Nonalcoholic fatty liver disease    Polyp of duodenum    Cervical dystonia    Anxiety    Hyperlipidemia    Lichen sclerosus    On hormone replacement therapy    Elevated blood pressure reading without diagnosis of hypertension       Current Medications -   Current Outpatient Medications:     albuterol (PROAIR HFA/PROVENTIL HFA/VENTOLIN HFA) 108 (90 Base) MCG/ACT inhaler, Inhale 2 puffs into the lungs every 6 hours as needed for shortness of breath, wheezing or cough, Disp: 18 g, Rfl: 0    atorvastatin (LIPITOR) 20 MG tablet, Take 1 tablet (20 mg) by mouth daily., Disp: 90 tablet, Rfl: 1    azelastine (ASTELIN) 0.1 % nasal spray, Spray  1 spray into both nostrils 2 times daily., Disp: 30 mL, Rfl: 0    azelastine (ASTELIN) 0.1 % nasal spray, Spray 1 spray into both nostrils 2 times daily (Patient not taking: Reported on 4/10/2025), Disp: 30 mL, Rfl: 0    clobetasol (TEMOVATE) 0.05 % external ointment, APPLY A THIN LAYER TO THE AFFECTED AREA(S) BY TOPICAL ROUTE 2 TIMES PER WEEK, Disp: , Rfl:     clotrimazole (LOTRIMIN) 1 % external cream, Apply topically 2 times daily., Disp: 30 g, Rfl: 1    estradiol (VIVELLE-DOT) 0.0375 MG/24HR BIW patch, APPLY 1 PATCH TRANSDERMALLY TWICE A WEEK, Disp: , Rfl:     famotidine (PEPCID) 10 MG tablet, Take 10 mg by mouth at bedtime As needed, Disp: , Rfl:     gabapentin (NEURONTIN) 100 MG capsule, Take 3 capsules (300 mg) by mouth at bedtime. Start with 1 daily for 3 days, Disp: 100 capsule, Rfl: 1    guaiFENesin-codeine (ROBITUSSIN AC) 100-10 MG/5ML solution, Take 5-10 mLs by mouth every 6 hours as needed for cough., Disp: 118 mL, Rfl: 0    medroxyPROGESTERone (PROVERA) 2.5 MG tablet, Take 1 tablet by mouth daily., Disp: , Rfl:     metroNIDAZOLE (METROCREAM) 0.75 % external cream, APPLY TO THE FACE TWICE DAILY, Disp: , Rfl:     triamcinolone (KENALOG) 0.1 % external cream, Apply topically 2 times daily As needed for itching of ear canal, Disp: 30 g, Rfl: 1    Current Facility-Administered Medications:     Botulinum Toxin Type A (BOTOX) 200 units injection 200 Units, 200 Units, Intramuscular, See Admin Instructions, , 185 Units at 03/14/25 0721    Allergies -   Allergies   Allergen Reactions    Citalopram Unknown    Escitalopram Unknown       Social History -   Social History     Socioeconomic History    Marital status:    Tobacco Use    Smoking status: Never     Passive exposure: Never    Smokeless tobacco: Never   Vaping Use    Vaping status: Never Used   Substance and Sexual Activity    Alcohol use: No    Drug use: Never    Sexual activity: Not Currently     Partners: Male   Social History Narrative    Patient  is  and has 3 children age 31,24,21  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  He did not go to college.  Medical daughter Jaclyn is in [place]  Older daughter Kaycee Adkins     Coagulant  for hemmorena jeanna Sanchez MD  3/11/2021                The 10-year ASCVD risk score (Arthur PUCKETT Jr., et al., 2013) is: 3.9%      Values used to calculate the score:        Age: 60 years        Sex: Female        Is Non- : No        Diabetic: No        Tobacco smoker: No        Systolic Blood Pressure: 128 mmHg        Is BP treated: No        HDL Cholesterol: 51 mg/dL        Total Cholesterol: 236 mg/dL     Social Drivers of Health     Financial Resource Strain: Low Risk  (12/6/2023)    Financial Resource Strain     Within the past 12 months, have you or your family members you live with been unable to get utilities (heat, electricity) when it was really needed?: No   Food Insecurity: Low Risk  (12/6/2023)    Food Insecurity     Within the past 12 months, did you worry that your food would run out before you got money to buy more?: No     Within the past 12 months, did the food you bought just not last and you didn t have money to get more?: No   Transportation Needs: Low Risk  (12/6/2023)    Transportation Needs     Within the past 12 months, has lack of transportation kept you from medical appointments, getting your medicines, non-medical meetings or appointments, work, or from getting things that you need?: No    Received from [facility], [facility]    Social Connections   Interpersonal Safety: Low Risk  (1/6/2025)    Interpersonal Safety     Do you feel physically and emotionally safe where you currently live?: Yes     Within the past 12 months, have you been hit, slapped, kicked or otherwise physically hurt by someone?: No     Within the past 12 months, have you been humiliated or  emotionally abused in other ways by your partner or ex-partner?: No   Housing Stability: Low Risk  (12/6/2023)    Housing Stability     Do you have housing? : Yes     Are you worried about losing your housing?: No       Family History -   Family History   Problem Relation Age of Onset    Breast Cancer Paternal Aunt     Sleep Apnea Brother     Snoring Brother     Sleep Apnea Brother     Snoring Brother     Celiac Disease Daughter 20.00    Hypertension Father     Cerebrovascular Disease Father 77.00    Cancer Mother        Review of Systems - As per HPI and PMHx, otherwise 10+ comprehensive system review is negative.    Physical Exam  General - The patient is nontoxic, in no distress. Alert and oriented to person and place, answers questions and cooperates with examination appropriately.   Neurologic - CN II-XII are intact. No focal neurologic deficits.   Voice and Breathing - The patient was breathing comfortably without the use of accessory muscles. There was no wheezing, stridor, or stertor.  The patients voice was clear and strong.  Eyes - Extraocular movements intact.  Sclera were not icteric or injected, conjunctiva were pink and moist.  Mouth - Examination of the oral cavity showed pink, healthy oral mucosa. No lesions or ulcerations noted.  The tongue was mobile and midline.  Throat - The walls of the oropharynx were smooth, symmetric, and had no lesions or ulcerations.  No postnasal drainage.  The uvula was midline on elevation.  Nose - External contour is symmetric, no gross deflection or scars. Nasal mucosa is pink and moist with no abnormal mucus.  The septum was midline and non-obstructive, turbinates of normal size and position.  No polyps, masses, or purulence noted on examination.  Neck - Soft, non-tender. Palpation of the occipital, submental, submandibular, internal jugular chain, and supraclavicular nodes did not demonstrate any abnormal lymph nodes or masses. No parotid masses. Palpation of the  thyroid was soft and smooth, with no nodules or goiter appreciated.  The trachea was mobile and midline.  Cardiovascular - carotid pulses are 2+ bilaterally, regular rhythm        A/P -     ICD-10-CM    1. Acute recurrent maxillary sinusitis  J01.01 predniSONE (DELTASONE) 20 MG tablet      2. Acute sinusitis, recurrence not specified, unspecified location  J01.90 Adult ENT  Referral          Eboni Carter is a 64 year old female with recurrent sinusitis for years, about once a year, but this seems to last for months. I recommend treatment with antibiotics and prednisone as needed.  Prednisone seem to help the most and I gave her a prescription of this and a refill in case her symptoms are still lingering next week.  I do not see pus today though.  She may have a component of headache or migraine since she describes the symptoms and photophobia.  1 additional study to consider the next time this happens is a CT sinus scan.  For preventative measure I recommend nasal saline irrigations, Flonase I showed her how to use this without getting bloody noses, and any allergy treatment but she does not seem to have allergies.    Italo Vega MD  Otolaryngology  [facility]

## 2025-04-23 NOTE — LETTER
4/23/2025      Eboni Carter  [address]  [place] [address]      Dear Colleague,    Thank you for referring your patient, Eboni Carter, to the [facility]. Please see a copy of my visit note below.    I am seeing this patient in consultation for acute sinusitis at the request of the provider Katherine Forrester      Chief Complaint - sinusitis    History of Present Illness - Eboni Carter is a 64 year old female who presents for evaluation of possible chronic sinusitis. The patient describes symptoms of feeling like she has the flu, pressure in head, eyes gooping, headache, light sensitivity, cough, green mucous, for the past 2 months. Presently, the patient is is better. These symptoms have recurred about once a year. No allergy symptoms. Treatments have included astelin, antibiotics (doxycycline and vantin), nasal steroids (flonase gives her a bloody nose), nasal saline irrigations (distilled water), prednisone (helped the most). The treatments seem to help. No prior history of sinus surgery.     Tests personally reviewed today for this visit:   1.) CRP 6 on 4/10/25  2.)sed rate normal 4/10/25  3.) CBC normal 4/10/25  4.) mono negative 4/10/25  5.) CMP normal 4/10/25    Past Medical History -   Patient Active Problem List   Diagnosis     Essential Hypercholesterolemia     Fibromyalgia     Tremor     Mood disorder     Abnormal CT of the abdomen     Diaphragmatic hernia     Diverticular disease of large intestine     Gastroesophageal reflux disease without esophagitis     Hemorrhoids     Nonalcoholic fatty liver disease     Polyp of duodenum     Cervical dystonia     Anxiety     Hyperlipidemia     Lichen sclerosus     On hormone replacement therapy     Elevated blood pressure reading without diagnosis of hypertension       Current Medications -   Current Outpatient Medications:      albuterol (PROAIR HFA/PROVENTIL HFA/VENTOLIN HFA) 108 (90 Base) MCG/ACT inhaler, Inhale 2  puffs into the lungs every 6 hours as needed for shortness of breath, wheezing or cough, Disp: 18 g, Rfl: 0     atorvastatin (LIPITOR) 20 MG tablet, Take 1 tablet (20 mg) by mouth daily., Disp: 90 tablet, Rfl: 1     azelastine (ASTELIN) 0.1 % nasal spray, Spray 1 spray into both nostrils 2 times daily., Disp: 30 mL, Rfl: 0     azelastine (ASTELIN) 0.1 % nasal spray, Spray 1 spray into both nostrils 2 times daily (Patient not taking: Reported on 4/10/2025), Disp: 30 mL, Rfl: 0     clobetasol (TEMOVATE) 0.05 % external ointment, APPLY A THIN LAYER TO THE AFFECTED AREA(S) BY TOPICAL ROUTE 2 TIMES PER WEEK, Disp: , Rfl:      clotrimazole (LOTRIMIN) 1 % external cream, Apply topically 2 times daily., Disp: 30 g, Rfl: 1     estradiol (VIVELLE-DOT) 0.0375 MG/24HR BIW patch, APPLY 1 PATCH TRANSDERMALLY TWICE A WEEK, Disp: , Rfl:      famotidine (PEPCID) 10 MG tablet, Take 10 mg by mouth at bedtime As needed, Disp: , Rfl:      gabapentin (NEURONTIN) 100 MG capsule, Take 3 capsules (300 mg) by mouth at bedtime. Start with 1 daily for 3 days, Disp: 100 capsule, Rfl: 1     guaiFENesin-codeine (ROBITUSSIN AC) 100-10 MG/5ML solution, Take 5-10 mLs by mouth every 6 hours as needed for cough., Disp: 118 mL, Rfl: 0     medroxyPROGESTERone (PROVERA) 2.5 MG tablet, Take 1 tablet by mouth daily., Disp: , Rfl:      metroNIDAZOLE (METROCREAM) 0.75 % external cream, APPLY TO THE FACE TWICE DAILY, Disp: , Rfl:      triamcinolone (KENALOG) 0.1 % external cream, Apply topically 2 times daily As needed for itching of ear canal, Disp: 30 g, Rfl: 1    Current Facility-Administered Medications:      Botulinum Toxin Type A (BOTOX) 200 units injection 200 Units, 200 Units, Intramuscular, See Admin Instructions, , 185 Units at 03/14/25 0721    Allergies -   Allergies   Allergen Reactions     Citalopram Unknown     Escitalopram Unknown       Social History -   Social History     Socioeconomic History     Marital status:    Tobacco Use      Smoking status: Never     Passive exposure: Never     Smokeless tobacco: Never   Vaping Use     Vaping status: Never Used   Substance and Sexual Activity     Alcohol use: No     Drug use: Never     Sexual activity: Not Currently     Partners: Male   Social History Narrative    Patient is  and has 3 children age 31,24,21  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  He did not go to college.  Medical daughter Jaclyn is in 3 [place]  Older daughter Kaycee Adkins     Coagulant  for hemophi jeanna   Stephen Sanchez MD  3/11/2021                The 10-year ASCVD risk score (Artuhr PUCKETT Jr., et al., 2013) is: 3.9%      Values used to calculate the score:        Age: 60 years        Sex: Female        Is Non- : No        Diabetic: No        Tobacco smoker: No        Systolic Blood Pressure: 128 mmHg        Is BP treated: No        HDL Cholesterol: 51 mg/dL        Total Cholesterol: 236 mg/dL     Social Drivers of Health     Financial Resource Strain: Low Risk  (12/6/2023)    Financial Resource Strain      Within the past 12 months, have you or your family members you live with been unable to get utilities (heat, electricity) when it was really needed?: No   Food Insecurity: Low Risk  (12/6/2023)    Food Insecurity      Within the past 12 months, did you worry that your food would run out before you got money to buy more?: No      Within the past 12 months, did the food you bought just not last and you didn t have money to get more?: No   Transportation Needs: Low Risk  (12/6/2023)    Transportation Needs      Within the past 12 months, has lack of transportation kept you from medical appointments, getting your medicines, non-medical meetings or appointments, work, or from getting things that you need?: No    [facility], [facility]    Social Connections   Interpersonal Safety: Low  Risk  (1/6/2025)    Interpersonal Safety      Do you feel physically and emotionally safe where you currently live?: Yes      Within the past 12 months, have you been hit, slapped, kicked or otherwise physically hurt by someone?: No      Within the past 12 months, have you been humiliated or emotionally abused in other ways by your partner or ex-partner?: No   Housing Stability: Low Risk  (12/6/2023)    Housing Stability      Do you have housing? : Yes      Are you worried about losing your housing?: No       Family History -   Family History   Problem Relation Age of Onset     Breast Cancer Paternal Aunt      Sleep Apnea Brother      Snoring Brother      Sleep Apnea Brother      Snoring Brother      Celiac Disease Daughter 20.00     Hypertension Father      Cerebrovascular Disease Father 77.00     Cancer Mother        Review of Systems - As per HPI and PMHx, otherwise 10+ comprehensive system review is negative.    Physical Exam  General - The patient is nontoxic, in no distress. Alert and oriented to person and place, answers questions and cooperates with examination appropriately.   Neurologic - CN II-XII are intact. No focal neurologic deficits.   Voice and Breathing - The patient was breathing comfortably without the use of accessory muscles. There was no wheezing, stridor, or stertor.  The patients voice was clear and strong.  Eyes - Extraocular movements intact.  Sclera were not icteric or injected, conjunctiva were pink and moist.  Mouth - Examination of the oral cavity showed pink, healthy oral mucosa. No lesions or ulcerations noted.  The tongue was mobile and midline.  Throat - The walls of the oropharynx were smooth, symmetric, and had no lesions or ulcerations.  No postnasal drainage.  The uvula was midline on elevation.  Nose - External contour is symmetric, no gross deflection or scars. Nasal mucosa is pink and moist with no abnormal mucus.  The septum was midline and non-obstructive, turbinates of  normal size and position.  No polyps, masses, or purulence noted on examination.  Neck - Soft, non-tender. Palpation of the occipital, submental, submandibular, internal jugular chain, and supraclavicular nodes did not demonstrate any abnormal lymph nodes or masses. No parotid masses. Palpation of the thyroid was soft and smooth, with no nodules or goiter appreciated.  The trachea was mobile and midline.  Cardiovascular - carotid pulses are 2+ bilaterally, regular rhythm        A/P -     ICD-10-CM    1. Acute recurrent maxillary sinusitis  J01.01 predniSONE (DELTASONE) 20 MG tablet      2. Acute sinusitis, recurrence not specified, unspecified location  J01.90 Adult ENT  Referral          Eboni Carter is a 64 year old female with recurrent sinusitis for years, about once a year, but this seems to last for months. I recommend treatment with antibiotics and prednisone as needed.  Prednisone seem to help the most and I gave her a prescription of this and a refill in case her symptoms are still lingering next week.  I do not see pus today though.  She may have a component of headache or migraine since she describes the symptoms and photophobia.  1 additional study to consider the next time this happens is a CT sinus scan.  For preventative measure I recommend nasal saline irrigations, Flonase I showed her how to use this without getting bloody noses, and any allergy treatment but she does not seem to have allergies.    Italo Vega MD  Otolaryngology  [facility]      Again, thank you for allowing me to participate in the care of your patient.        Sincerely,        Italo Vega MD    Electronically signed

## 2025-04-30 ENCOUNTER — TRANSFERRED RECORDS (OUTPATIENT)
Dept: HEALTH INFORMATION MANAGEMENT | Facility: CLINIC | Age: 65
End: 2025-04-30
Payer: COMMERCIAL

## 2025-05-28 ENCOUNTER — TELEPHONE (OUTPATIENT)
Dept: NEUROLOGY | Facility: CLINIC | Age: 65
End: 2025-05-28
Payer: COMMERCIAL

## 2025-05-28 NOTE — TELEPHONE ENCOUNTER
Health Call Center    Phone Message    May a detailed message be left on voicemail: yes     Reason for Call:  Patient called to speak to care team, patient states that the last injection did not help much with her shakiness, her daughters wedding is coming up on June 13 and was wondering if there is alternative medication to help with her cervical dystonia/ shakiness for the day of her daughters wedding? Patient asking for a call back to discuss further    Action Taken: Other: [facility] neurology    Travel Screening: Not Applicable     Date of Service:

## 2025-05-30 NOTE — TELEPHONE ENCOUNTER
RN returned call to Eboni to obtain more information. Patient states she feels the botox injections are working for her, however she believes her last injection in March may have not been in the correct spot. She would like to continue with botox and has her next appointment on 6/20.     Eboni is requesting some recommendations regarding her shaking symptoms for the day of her daughter's wedding on 6/13. She is wondering if a muscle relaxer would be beneficial, however she does not want to feel drowsy. If there are no recommendations, Eboni is fine to wait until her next appointment on 6/20.    Jillian CABELLO RN, BSN  [facility]

## 2025-06-02 NOTE — TELEPHONE ENCOUNTER
Addressing via [employer]. Closing encounter.     Jillian CABELLO RN, BSN  [facility] Neurology

## 2025-06-06 ENCOUNTER — TELEPHONE (OUTPATIENT)
Dept: NEUROLOGY | Facility: CLINIC | Age: 65
End: 2025-06-06

## 2025-06-06 NOTE — TELEPHONE ENCOUNTER
MTM referral from: [place] clinic visit (referral by provider)    MTM referral outreach attempt #1 on June 6, 2025 at 1:29 PM      Outcome: Spoke with patient , she will contact Dr Azevedo (would like to start on something sooner-our next opening isn't until mid July)    Use hbc for the carrier/Plan on the flowsheet          Carlitos Verdugo  MT

## 2025-06-10 ENCOUNTER — VIRTUAL VISIT (OUTPATIENT)
Dept: PHARMACY | Facility: CLINIC | Age: 65
End: 2025-06-10
Attending: PSYCHIATRY & NEUROLOGY
Payer: COMMERCIAL

## 2025-06-10 DIAGNOSIS — R25.1 TREMOR: Primary | ICD-10-CM

## 2025-06-10 DIAGNOSIS — G24.3 CERVICAL DYSTONIA: ICD-10-CM

## 2025-06-10 DIAGNOSIS — G47.00 INSOMNIA: ICD-10-CM

## 2025-06-10 DIAGNOSIS — E78.00 PURE HYPERCHOLESTEROLEMIA: ICD-10-CM

## 2025-06-10 RX ORDER — PROPRANOLOL HYDROCHLORIDE 10 MG/1
10 TABLET ORAL 3 TIMES DAILY PRN
Qty: 90 TABLET | Refills: 3 | Status: SHIPPED | OUTPATIENT
Start: 2025-06-10

## 2025-06-10 NOTE — PATIENT INSTRUCTIONS
"Recommendations from today's MTM visit:                                                    MTM (medication therapy management) is a service provided by a clinical pharmacist designed to help you get the most of out of your medicines.     Medication list updated and reviewed  Please start propranolol 10mg three times daily as needed. Hopefully this will help with anxiety and in turn help prevent tremors from worsening  Potential side effects include decreasing pulse or heart rate (though this is more likely with higher doses), feeling fatigued, dizzy, and/or lightheaded. Please try and check blood pressure and pulse if you do develop any dizziness.     Follow-up:   Appointments in Next Year      Jun 20, 2025 8:00 AM  (Arrive by 7:45 AM)  Neurotoxin with Natalia Azevedo DO  [facility]rology [facility] ([facility]) [phone]     Jul 17, 2025 11:00 AM  Pharmacist Visit with Karyna Ochoa [facility] Neurology [facility] ([facility] [place] ) [phone]     Sep 26, 2025 7:30 AM  (Arrive by 7:15 AM)  Neurotoxin with Natalia Azevedo DO  [facility] Neurology [facility] ([facility]) [phone]     Dec 19, 2025 8:45 AM  (Arrive by 8:30 AM)  Neurotoxin with Natalia Azevedo DO  [facility] Neurology [facility] ([facility]) [phone]            It was great speaking with you today.  I value your experience and would be very thankful for your time in providing feedback in our clinic survey. In the next few days, you may receive an email or text message from [facility] [employer] with a link to a survey related to your  clinical pharmacist.\"     To schedule another MTM appointment, please call the clinic directly or you may call the MTM scheduling line at [phone].    My Clinical Pharmacist's contact information:                          "                             Please feel free to contact me with any questions or concerns you have.      Karyna Ochoa, Pharm.D., MPH  Medication Therapy Management Pharmacist   [facility]rology [facility]

## 2025-06-10 NOTE — PROGRESS NOTES
Medication Therapy Management (MTM) Encounter    ASSESSMENT:                            Medication Adherence/Access: No issues identified.    Cervical Dystonia:   Discussed various pharmacotherapies that could be considered for symptoms though response is not guaranteed. Given patient would like to use a medication when needed and finds that anxiety worsens her tremors, reasonable to try low dose propranolol. Education provided on dosing, administration, and potential side effects of propranolol. All questions answered.     Hyperlipidemia   Recommend fasting lipid panel to be rechecked annually. Could consider dose increase in statin pending future labs.     Insomnia   Controlled.     PLAN:                            Medication list updated and reviewed  Please start propranolol 10mg three times daily as needed. Hopefully this will help with anxiety and in turn help prevent tremors from worsening  Potential side effects include decreasing pulse or heart rate (though this is more likely with higher doses), feeling fatigued, dizzy, and/or lightheaded. Please try and check blood pressure and pulse if you do develop any dizziness.     Follow-up:   Appointments in Next Year      Jun 20, 2025 8:00 AM  (Arrive by 7:45 AM)  Neurotoxin with Natalia Azevedo DO  [facility] ([facility]) [phone]     Jul 17, 2025 11:00 AM  Pharmacist Visit with Karyna Ochoa [facility] Neurology [facility] ([facility] ) [phone]     Sep 26, 2025 7:30 AM  (Arrive by 7:15 AM)  Neurotoxin with DO CARMENZA [facility] Neurology [facility] ([facility]) [phone]     Dec 19, 2025 8:45 AM  (Arrive by 8:30 AM)  Neurotoxin with Natalia Azevedo DO  [facility]rology [facility] ([facility]) [phone]       "      SUBJECTIVE/OBJECTIVE:                          Eboni Carter is a 64 year old female seen for an initial visit. She was referred to me from Natalia Azevedo.      Reason for visit: Initial MTM.    Allergies/ADRs: Reviewed in chart  Past Medical History: Reviewed in chart  Tobacco: She reports that she has never smoked. She has never been exposed to tobacco smoke. She has never used smokeless tobacco.  Alcohol: none  Additional Providers: Neurologist: Dr. Azevedo    Medication Adherence/Access: Patient uses pill box(es) which she manages herself    Cervical Dystonia:   - Botox every 3 months. Last dose was 3/14/25.   This Friday the patient's daughter will be getting  which is why she would like to discuss treatment options sooner rather than later.     She does shake her head in a \"no\" manner and this is the most bothersome especially around big events like her daughter's upcoming wedding.  She has been getting Botox and it was working well but does not feel that this past round of Botox was as helpful as before.     She finds that anxiety makes her shaking worse and she is likely going to be anxious the day of her daughter's wedding which will worsen the tremors.    She would not like anything for pain or that makes her groggy, she is just looking for something to help with the shaking specifically for the day of her daughter's wedding.     Hyperlipidemia   - Atorvastatin 20mg daily  Patient reports no significant myalgias or other side effects.       Insomnia   - Gabapentin 100mg at bedtime  Patient reports no issues at this time.        Today's Vitals: LMP 09/01/2015 (Approximate)   ----------------    I spent 40 minutes with this patient today. All changes were made via collaborative practice agreement with Natalia Azevedo.     A summary of these recommendations was sent via [employer].    Karyna Ochoa, Pharm.D., MPH  Medication Therapy Management Pharmacist   [facility] Neurology " Clinic    Telemedicine Visit Details  The patient's medications can be safely assessed via a telemedicine encounter.  Type of service:  Telephone visit  Originating Location (pt. Location): Home    Distant Location (provider location):  Off-site  Start Time: 12:32 PM  End Time: 1:11 PM     Medication Therapy Recommendations  Tremor   1 Rationale: Synergistic therapy - Needs additional medication therapy - Indication   Recommendation: Start Medication - PROPRANOLOL HCL   Status: Accepted per CPA   Identified Date: 6/10/2025 Completed Date: 6/10/2025

## 2025-06-10 NOTE — Clinical Note
Ruperto Fisher! I was able to sneak Eboni in for a visit. We decided to go with propranolol to see if we could control tremors a bit to therefore prevent worsening tremors. She did not want a muscle relaxer and preferred an as needed medication. Thanks!! Karyna

## 2025-06-18 ENCOUNTER — TRANSFERRED RECORDS (OUTPATIENT)
Dept: HEALTH INFORMATION MANAGEMENT | Facility: CLINIC | Age: 65
End: 2025-06-18
Payer: COMMERCIAL

## 2025-07-07 ENCOUNTER — TELEPHONE (OUTPATIENT)
Dept: FAMILY MEDICINE | Facility: CLINIC | Age: 65
End: 2025-07-07
Payer: COMMERCIAL

## 2025-07-07 ENCOUNTER — TRANSFERRED RECORDS (OUTPATIENT)
Dept: HEALTH INFORMATION MANAGEMENT | Facility: CLINIC | Age: 65
End: 2025-07-07
Payer: COMMERCIAL

## 2025-07-07 DIAGNOSIS — F41.9 ANXIETY: ICD-10-CM

## 2025-07-07 RX ORDER — GABAPENTIN 100 MG/1
300 CAPSULE ORAL AT BEDTIME
Qty: 100 CAPSULE | Refills: 1 | Status: CANCELLED | OUTPATIENT
Start: 2025-07-07

## 2025-07-10 ENCOUNTER — LAB REQUISITION (OUTPATIENT)
Dept: LAB | Facility: CLINIC | Age: 65
End: 2025-07-10
Payer: COMMERCIAL

## 2025-07-10 DIAGNOSIS — N95.0 POSTMENOPAUSAL BLEEDING: ICD-10-CM

## 2025-07-10 LAB
ERYTHROCYTE [DISTWIDTH] IN BLOOD BY AUTOMATED COUNT: 12.5 % (ref 10–15)
HCT VFR BLD AUTO: 43.7 % (ref 35–47)
HGB BLD-MCNC: 14.9 G/DL (ref 11.7–15.7)
MCH RBC QN AUTO: 29.7 PG (ref 26.5–33)
MCHC RBC AUTO-ENTMCNC: 34.1 G/DL (ref 31.5–36.5)
MCV RBC AUTO: 87 FL (ref 78–100)
PLATELET # BLD AUTO: 290 10E3/UL (ref 150–450)
RBC # BLD AUTO: 5.01 10E6/UL (ref 3.8–5.2)
WBC # BLD AUTO: 8.3 10E3/UL (ref 4–11)

## 2025-07-10 PROCEDURE — 85027 COMPLETE CBC AUTOMATED: CPT | Performed by: OBSTETRICS & GYNECOLOGY

## 2025-07-10 RX ORDER — GABAPENTIN 100 MG/1
100 CAPSULE ORAL AT BEDTIME
Qty: 90 CAPSULE | Refills: 3 | Status: SHIPPED | OUTPATIENT
Start: 2025-07-10

## 2025-07-10 NOTE — TELEPHONE ENCOUNTER
Patient returning call. She is only taking 100mg of gabapentin at hs. She does not need a referral at the moment      Adri Art RN

## 2025-07-10 NOTE — TELEPHONE ENCOUNTER
Attempted to call patient, left message for return call to clinic. Per most recent MTM notes, appears patient takes gabapentin 100 mg hs. Please confirm current dosage and then route to provider for review. Thanks!    Julianne Rivera RN

## 2025-07-17 ENCOUNTER — VIRTUAL VISIT (OUTPATIENT)
Dept: PHARMACY | Facility: CLINIC | Age: 65
End: 2025-07-17
Attending: PSYCHIATRY & NEUROLOGY
Payer: COMMERCIAL

## 2025-07-17 DIAGNOSIS — G47.00 INSOMNIA: ICD-10-CM

## 2025-07-17 DIAGNOSIS — R25.1 TREMOR: ICD-10-CM

## 2025-07-17 DIAGNOSIS — G24.3 CERVICAL DYSTONIA: Primary | ICD-10-CM

## 2025-07-17 RX ORDER — PROPRANOLOL HYDROCHLORIDE 10 MG/1
TABLET ORAL
Qty: 90 TABLET | Refills: 3 | Status: SHIPPED | OUTPATIENT
Start: 2025-07-17

## 2025-07-17 NOTE — Clinical Note
Hi Dr. Sanchez - I spoke with Eboni today and she is still struggling with insomnia. I recommended she try increasing her gabapentin to 200mg at bedtime and follow up with you for official dose increase if it is helpful.   Karyna Bolanos, Pharm.D., MPH Medication Therapy Management Pharmacist  [facility] Neurology [facility]

## 2025-07-17 NOTE — PATIENT INSTRUCTIONS
"Recommendations from today's MTM visit:                                                      Could consider taking 30 mg of propranolol once in the morning or taking 20 mg in the morning and an extra 10 mg later in the day if needed. Please monitor for dizziness, light headedness, and low blood pressure.   Could consider taking 200 mg of gabapentin at night for insomnia. Please discuss this further at your upcoming appointment with your provider    Follow-up:   Appointments in Next Year      Sep 26, 2025 7:30 AM  (Arrive by 7:15 AM)  Neurotoxin with Natalia Azevedo DO  [facility]rology [facility] ([facility]) [phone]     Dec 19, 2025 8:45 AM  (Arrive by 8:30 AM)  Neurotoxin with Natalia Azevedo DO  [facility] ([facility]) [phone]     Hair 15, 2026 11:00 AM  Pharmacist Visit with Karyna Ochoa [facility] Neurology MTM ([facility] [place] ) [phone]            It was great speaking with you today.  I value your experience and would be very thankful for your time in providing feedback in our clinic survey. In the next few days, you may receive an email or text message from [employer] with a link to a survey related to your  clinical pharmacist.\"     To schedule another MTM appointment, please call the clinic directly or you may call the MTM scheduling line at [phone].    My Clinical Pharmacist's contact information:                                                      Please feel free to contact me with any questions or concerns you have.      Karyna Ochoa, Pharm.D., MPH  Medication Therapy Management Pharmacist   [facility] Neurology Clinic   "

## 2025-07-17 NOTE — PROGRESS NOTES
Medication Therapy Management (MTM) Encounter    ASSESSMENT:                            Medication Adherence/Access: No issues identified.    Cervical Dystonia:   Discussed that patient could consider taking a higher dose of propranolol to further help with tremor control. Potential dose increased and potential side effects were discussed. Patient would prefer to stay at current dose of propranolol for the time being. This is reasonable and education provided that patient can use up to 30mg per day as needed for tremor control. Prescription updated to reflect current dosing.    Insomnia  Poorly controlled, may be a symptom of menopause now that she has stopped the estrogen. Discussed taking gabapentin and trazodone is not preferred and that the dose of gabapentin could be increased. Will defer to primary care provider. Recommend patient continue to take gabapentin and melatonin for sleep. Patient does have upcoming appointment with her provider to discuss menopause and insomnia.     PLAN:                            Could consider taking 30 mg of propranolol once in the morning or taking 20 mg in the morning and an extra 10 mg later in the day if needed. Please monitor for dizziness, light headedness, and low blood pressure.   Could consider taking 200 mg of gabapentin at night for insomnia. Please discuss this further at your upcoming appointment with your provider    Follow-up:   Appointments in Next Year      Sep 26, 2025 7:30 AM  (Arrive by 7:15 AM)  Neurotoxin with Natalia Azevedo DO  [facility] Neurology Clinic [facility] ([facility]) [phone]     Dec 19, 2025 8:45 AM  (Arrive by 8:30 AM)  Neurotoxin with Natalia Azevedo DO  [facility] Neurology Clinic [facility] ([facility]) [phone]     Hair 15, 2026 11:00 AM  Pharmacist Visit with Karyna Ochoa [facility] Neurology MTM ([facility]  [facility] [place] ) [phone]          SUBJECTIVE/OBJECTIVE:                          Eboni Carter is a 64 year old female seen for an initial visit. She was referred to me from Natalia Azevedo.      Reason for visit: Medication follow up     Allergies/ADRs: Reviewed in chart  Past Medical History: Reviewed in chart  Tobacco: She reports that she has never smoked. She has never been exposed to tobacco smoke. She has never used smokeless tobacco.  Alcohol: none  Additional Providers: Neurologist: Dr. Azevedo    Medication Adherence/Access: Patient uses pill box(es) which she manages herself    Cervical Dystonia:   - Botox every 3 months. Last dose was 6/20/25.   - Propranolol 20mg every day as needed (prescribed 10mg three times daily as needed)    Patient did not find any difference with taking 10 mg of propranolol at once and has been instead taking 20 mg at once. She finds that this is very helpful for her and that it last for about 5 or 6 hours. She said that when she started taking it, she did experience some sleepiness, but that this has since resolved. Patient reported that her head shakes decreased by 60-80% when taking this dose. She thinks that her tremors could still be better controlled, although she is happy with the current results. No medication side effects reported.     Insomnia  - Gabapentin 100 mg daily at night  - Melatonin 1 mg takes 1-3 doses at night   - Trazodone 50 mg takes 1/4 tablet at night (previously prescribed but was stopped, patient had a few tablets left that she took the other night to help with sleep)    Patient recently stopped taking her estrogen patch due to spotting. Since stopping the patch, she has had worsening insomnia. She does have an upcoming appointment to discuss estrogen replacement and insomnia with her primary care provider.      Patient is currently taking gabapentin and melatonin for sleep without resolution of insomnia. Patient is wondering if  patient should move back to trazodone for sleep, if she should stay on the gabapentin, if she can take the gabapentin and trazodone together, or if she can take 2 gabapentin at the same time.       Today's Vitals: LMP 09/01/2015 (Approximate)   ----------------    I spent minutes with this patient today. All changes were made via collaborative practice agreement with Natalia Azevedo.     A summary of these recommendations was sent via [employer].    Lamont Miller   Pharmacy Student     I was present with the pharmacy student who participated in the documentation of this note. I have verified the history, personally performed the medical decision making, and have verified the content of the note, which accurately reflects my assessment of the patient and the plan of care     Karyna Ochoa, Pharm.D., MPH  Medication Therapy Management Pharmacist   [facility] Neurology Clinic    Telemedicine Visit Details  The patient's medications can be safely assessed via a telemedicine encounter.  Type of service:  Telephone visit  Originating Location (pt. Location): Home    Distant Location (provider location):  Off-site  Start Time: 12:32 PM  End Time: 1:11 PM     Medication Therapy Recommendations  Insomnia   1 Current Medication: gabapentin (NEURONTIN) 100 MG capsule   Current Medication Sig: Take 1 capsule (100 mg) by mouth at bedtime.   Rationale: Dose too low - Dosage too low - Effectiveness   Recommendation: Make Appt with PCP   Status: Patient Agreed - Adherence/Education   Identified Date: 7/17/2025 Completed Date: 7/17/2025

## 2025-07-28 ENCOUNTER — OFFICE VISIT (OUTPATIENT)
Dept: FAMILY MEDICINE | Facility: CLINIC | Age: 65
End: 2025-07-28
Payer: COMMERCIAL

## 2025-07-28 VITALS
RESPIRATION RATE: 16 BRPM | HEART RATE: 71 BPM | DIASTOLIC BLOOD PRESSURE: 67 MMHG | WEIGHT: 183.8 LBS | BODY MASS INDEX: 30.62 KG/M2 | SYSTOLIC BLOOD PRESSURE: 138 MMHG | TEMPERATURE: 99 F | HEIGHT: 65 IN | OXYGEN SATURATION: 100 %

## 2025-07-28 DIAGNOSIS — Z98.890 PONV (POSTOPERATIVE NAUSEA AND VOMITING): ICD-10-CM

## 2025-07-28 DIAGNOSIS — R11.2 PONV (POSTOPERATIVE NAUSEA AND VOMITING): ICD-10-CM

## 2025-07-28 DIAGNOSIS — K21.9 GASTROESOPHAGEAL REFLUX DISEASE WITHOUT ESOPHAGITIS: ICD-10-CM

## 2025-07-28 DIAGNOSIS — Z79.890 ON HORMONE REPLACEMENT THERAPY: ICD-10-CM

## 2025-07-28 DIAGNOSIS — Z01.818 PREOP GENERAL PHYSICAL EXAM: Primary | ICD-10-CM

## 2025-07-28 DIAGNOSIS — F41.9 ANXIETY: ICD-10-CM

## 2025-07-28 DIAGNOSIS — F39 MOOD DISORDER: ICD-10-CM

## 2025-07-28 PROBLEM — E78.5 HYPERLIPIDEMIA: Status: RESOLVED | Noted: 2024-11-05 | Resolved: 2025-07-28

## 2025-07-28 PROCEDURE — 3075F SYST BP GE 130 - 139MM HG: CPT | Performed by: FAMILY MEDICINE

## 2025-07-28 PROCEDURE — G2211 COMPLEX E/M VISIT ADD ON: HCPCS | Performed by: FAMILY MEDICINE

## 2025-07-28 PROCEDURE — 3078F DIAST BP <80 MM HG: CPT | Performed by: FAMILY MEDICINE

## 2025-07-28 PROCEDURE — 99214 OFFICE O/P EST MOD 30 MIN: CPT | Performed by: FAMILY MEDICINE

## 2025-07-28 RX ORDER — CLOBETASOL PROPIONATE 0.5 MG/G
OINTMENT TOPICAL
COMMUNITY

## 2025-07-28 RX ORDER — PROGESTERONE 100 MG/1
CAPSULE ORAL
COMMUNITY
End: 2025-07-28

## 2025-07-28 RX ORDER — PREDNISONE 20 MG/1
TABLET ORAL
COMMUNITY
End: 2025-07-28

## 2025-07-28 RX ORDER — TOLTERODINE 4 MG/1
CAPSULE, EXTENDED RELEASE ORAL
COMMUNITY

## 2025-07-28 NOTE — PROGRESS NOTES
Preoperative Evaluation  [facility]  [address] [facility] [address]  Phone: [phone]  Fax: [phone]  Primary Provider: Stephen Sanchez MD  Pre-op Performing Provider: Stephen Sanchez MD  Jul 28, 2025 7/23/2025   Surgical Information   What procedure is being done? Hysteroscopy   Facility or Hospital where procedure/surgery will be performed: [facility]   Who is doing the procedure / surgery? Dr. Valenzuela   Date of surgery / procedure: Wednesday July 30 2025   Time of surgery / procedure: am   Where do you plan to recover after surgery? at home with family     Fax number for surgical facility: Note does not need to be faxed, will be available electronically in Epic.    Assessment & Plan     ICD-10-CM    1. Preop general physical exam  Z01.818       2. On hormone replacement therapy  Z79.890       3. Gastroesophageal reflux disease without esophagitis  K21.9       4. Mood disorder  F39       5. Anxiety  F41.9       6. PONV (postoperative nausea and vomiting)  R11.2     Z98.890           The proposed surgical procedure is considered INTERMEDIATE risk.  Patient has history of PONV.  Consider antiemetics perioperatively.        - No identified additional risk factors other than previously addressed    Preoperative Medication Instructions  Antiplatelet or Anticoagulation Medication Instructions   - We reviewed the medication list and the patient is not on an antiplatelet or anticoagulation medications.    Additional Medication Instructions  Take all scheduled medications on the day of surgery EXCEPT for modifications listed below:   - Herbal medications and vitamins: DO NOT TAKE 14 days prior to surgery.   - Beta Blockers (atenolol, metoprolol, propranolol) : Continue taking on the day of surgery.   - pregabalin, gabapentin: Continue without modification.    Recommendation  Approval given to proceed with proposed procedure, without further  diagnostic evaluation.    Other issues addressed.  1: On hormone replacement therapy: Now she is off estrogen and only on medroxyprogesterone 2.5 mg that she was asked to continue.  She will recheck with OB regarding duration of continuation.  2: GERD: Okay on Pepcid.  Continue.  3: Mood disorder: Mostly anxiety with sleep issues.  Doing well on propranolol and gabapentin  4: Takes gabapentin for anxiety and doing okay.      The longitudinal plan of care for the diagnosis(es)/condition(s) as documented were addressed during this visit. Due to the added complexity in care, I will continue to support Eboni in the subsequent management and with ongoing continuity of care.    Subjective   Eboni is a 64 year old, presenting for the following:  Pre-Op Exam (DOS 07/30/2025, HYSTEROSCOPY, WITH DILATION AND CURETTAGE OF UTERUS, @ [facility], with Dr. Valenzuela)          7/28/2025     8:25 AM   Additional Questions   Roomed by Teodora Grover CMA     HPI: Undergoing hysteroscopy with D&C for noted thickened endometrial lining and vaginal bleeding.  On HRT for long time.          7/23/2025   Pre-Op Questionnaire   Have you ever had a heart attack or stroke? No   Have you ever had surgery on your heart or blood vessels, such as a stent placement, a coronary artery bypass, or surgery on an artery in your head, neck, heart, or legs? No   Do you have chest pain with activity? No   Do you have a history of heart failure? No   Do you currently have a cold, bronchitis or symptoms of other infection? No   Do you have a cough, shortness of breath, or wheezing? No   Do you or anyone in your family have previous history of blood clots? No   Do you or does anyone in your family have a serious bleeding problem such as prolonged bleeding following surgeries or cuts? No   Have you ever had problems with anemia or been told to take iron pills? No   Have you had any abnormal blood loss such as black, tarry or bloody stools, or  abnormal vaginal bleeding? No   Have you ever had a blood transfusion? No   Are you willing to have a blood transfusion if it is medically needed before, during, or after your surgery? Yes   Have you or any of your relatives ever had problems with anesthesia? (!) YES - PONV   Do you have sleep apnea, excessive snoring or daytime drowsiness? No   Do you have any artifical heart valves or other implanted medical devices like a pacemaker, defibrillator, or continuous glucose monitor? No   Do you have artificial joints? No   Are you allergic to latex? No     Advance Care Planning    Discussed advance care planning with patient; informed AVS has link to [employer] [employer].    Preoperative Review of    reviewed - on gabapentin      Status of Chronic Conditions:  See problem list for active medical problems.  Problems all longstanding and stable, except as noted/documented.  See ROS for pertinent symptoms related to these conditions.    Patient Active Problem List    Diagnosis Date Noted    PONV (postoperative nausea and vomiting) 07/28/2025     Priority: Medium    On hormone replacement therapy 03/03/2025     Priority: Medium    Elevated blood pressure reading without diagnosis of hypertension 03/03/2025     Priority: Medium    Lichen sclerosus 02/04/2025     Priority: Medium    Abnormal CT of the abdomen 08/09/2024     Priority: Medium    Cervical dystonia 08/09/2024     Priority: Medium    Anxiety 08/09/2024     Priority: Medium    Tremor 07/09/2024     Priority: Medium    Mood disorder 07/09/2024     Priority: Medium    Nonalcoholic fatty liver disease 03/01/2022     Priority: Medium    Polyp of duodenum 08/19/2020     Priority: Medium    Diaphragmatic hernia 08/17/2020     Priority: Medium    Diverticular disease of large intestine 08/17/2020     Priority: Medium    Hemorrhoids 08/17/2020     Priority: Medium    Fibromyalgia 10/11/2016     Priority: Medium    Gastroesophageal reflux disease without esophagitis  2016     Priority: Medium    Essential Hypercholesterolemia      Priority: Medium     Created by Conversion          No past medical history on file.  Past Surgical History:   Procedure Laterality Date    HIATAL HERNIA REPAIR      [facility]  DELIVERY ONLY      Description:  Section;  Proc Date: 1990;    [facility]  DELIVERY ONLY      Description:  Section;  Proc Date: 1997;    [facility]  DELIVERY ONLY      Description:  Section;  Proc Date: 1999;    [facility] LIGATE FALLOPIAN TUBE      Description: Tubal Ligation;  Proc Date: 1999;     Current Outpatient Medications   Medication Sig Dispense Refill    clobetasol (TEMOVATE) 0.05 % external ointment APPLY A THIN LAYER TO THE AFFECTED AREA(S) BY TOPICAL ROUTE 2 TIMES PER WEEK      clotrimazole (LOTRIMIN) 1 % external cream Apply topically 2 times daily. (Patient taking differently: Apply topically. Twice daily 2 days per week) 30 g 1    famotidine (PEPCID) 10 MG tablet Take 10 mg by mouth at bedtime As needed      gabapentin (NEURONTIN) 100 MG capsule Take 1-2 capsules (100-200 mg) by mouth at bedtime. 180 capsule 3    medroxyPROGESTERone (PROVERA) 2.5 MG tablet Take 1 tablet by mouth daily.      melatonin 1 MG TABS tablet Take 1-3 mg by mouth nightly as needed for sleep.      metroNIDAZOLE (METROCREAM) 0.75 % external cream as needed.      propranolol (INDERAL) 10 MG tablet 20mg once daily + 10mg additional as needed- for tremors 90 tablet 3    tolterodine ER (DETROL LA) 4 MG 24 hr capsule          Allergies   Allergen Reactions    Citalopram Unknown    Escitalopram Unknown    Doxycycline Rash        Social History     Tobacco Use    Smoking status: Never     Passive exposure: Never    Smokeless tobacco: Never   Substance Use Topics    Alcohol use: No     Family History   Problem Relation Age of Onset    Breast Cancer Paternal Aunt     Sleep Apnea Brother     Snoring Brother     Sleep Apnea Brother     Snoring  "Brother     Celiac Disease Daughter 20.00    Hypertension Father     Cerebrovascular Disease Father 77.00    Cancer Mother      History   Drug Use Unknown             Review of Systems  Constitutional, HEENT, cardiovascular, pulmonary, GI, , musculoskeletal, neuro, skin, endocrine and psych systems are negative, except as otherwise noted.    Objective    /67 (BP Location: Left arm, Patient Position: Sitting, Cuff Size: Adult Regular)   Pulse 71   Temp 99  F (37.2  C) (Oral)   Resp 16   Ht 1.638 m (5' 4.5\")   Wt 83.4 kg (183 lb 12.8 oz)   LMP 09/01/2015 (Approximate)   SpO2 100%   BMI 31.06 kg/m     Estimated body mass index is 31.06 kg/m  as calculated from the following:    Height as of this encounter: 1.638 m (5' 4.5\").    Weight as of this encounter: 83.4 kg (183 lb 12.8 oz).  Physical Exam  GENERAL: alert and no distress  EYES: Eyes grossly normal to inspection, PERRL and conjunctivae and sclerae normal  NECK: no adenopathy, no asymmetry, masses, or scars  RESP: lungs clear to auscultation - no rales, rhonchi or wheezes  CV: regular rate and rhythm, normal S1 S2, no S3 or S4, no murmur, click or rub, no peripheral edema  ABDOMEN: soft, nontender, no hepatosplenomegaly, no masses and bowel sounds normal  MS: no gross musculoskeletal defects noted, no edema    Recent Labs   Lab Test 07/10/25  1426 04/10/25  1337 11/06/24  1055   HGB 14.9 14.4  --     193  --    NA  --  139  --    POTASSIUM  --  4.1  --    CR  --  0.62  --    A1C  --   --  5.4        Diagnostics  No labs were ordered during this visit.   No EKG required, no history of coronary heart disease, significant arrhythmia, peripheral arterial disease or other structural heart disease.    Revised Cardiac Risk Index (RCRI)  The patient has the following serious cardiovascular risks for perioperative complications:   - No serious cardiac risks = 0 points     RCRI Interpretation: 0 points: Class I (very low risk - 0.4% complication " rate)         Signed Electronically by: Stephen Sanchez MD  A copy of this evaluation report is provided to the requesting physician.

## 2025-07-28 NOTE — PATIENT INSTRUCTIONS
How to Take Your Medication Before Surgery  Preoperative Medication Instructions   Antiplatelet or Anticoagulation Medication Instructions   - We reviewed the medication list and the patient is not on an antiplatelet or anticoagulation medications.    Additional Medication Instructions  Take all scheduled medications on the day of surgery EXCEPT for modifications listed below:   - Herbal medications and vitamins: DO NOT TAKE 14 days prior to surgery.   - Beta Blockers (atenolol, metoprolol, propranolol) : Continue taking on the day of surgery.   - pregabalin, gabapentin: Continue without modification.       Patient Education   Preparing for Your Surgery  For Adults  Getting started  In most cases, a nurse will call to review your health history and instructions. They will give you an arrival time based on your scheduled surgery time. Please be ready to share:  Your doctor's clinic name and phone number  Your medical, surgical, and anesthesia history  A list of allergies and sensitivities  A list of medicines, including herbal treatments and over-the-counter drugs  Whether the patient has a legal guardian (ask how to send us the papers in advance)  Note: You may not receive a call if you were seen at our PAC (Preoperative Assessment Center).  Please tell us if you're pregnant--or if there's any chance you might be pregnant. Some surgeries may injure a fetus (unborn baby), so they require a pregnancy test. Surgeries that are safe for a fetus don't always need a test, and you can choose whether to have one.   Preparing for surgery  Within 10 to 30 days of surgery: Have a pre-op exam (sometimes called an H&P, or History and Physical). This can be done at a clinic or pre-operative center.  If you're having a , you may not need this exam. Talk to your care team.  At your pre-op exam, talk to your care team about all medicines you take. (This includes CBD oil and any drugs, such as THC, marijuana, and other forms  of cannabis.) If you need to stop any medicine before surgery, ask when to start taking it again.  This is for your safety. Many medicines and drugs can make you bleed too much during surgery. Some change how well surgery (anesthesia) drugs work.  Call your insurance company to let them know you're having surgery. (If you don't have insurance, call [phone].)  Call your clinic if there's any change in your health. This includes a scrape or scratch near the surgery site, or any signs of a cold (sore throat, runny nose, cough, rash, fever).  Eating and drinking guidelines  For your safety: Unless your surgeon tells you otherwise, follow the guidelines below.  Eat and drink as normal until 8 hours before you arrive for surgery. After that, no food or milk. You can spit out gum when you arrive.  Drink clear liquids until 2 hours before you arrive. These are liquids you can see through, like water, Gatorade, and Propel Water. They also include plain black coffee and tea (no cream or milk).  No alcohol for 24 hours before you arrive. The night before surgery, stop any drinks that contain THC.  If your care team tells you to take medicine on the morning of surgery, it's okay to take it with a sip of water. No other medicines or drugs are allowed (including CBD oil)--follow your care team's instructions.  If you have questions the day of surgery, call your hospital or surgery center.   Preventing infection  Shower or bathe the night before and the morning of surgery. Follow the instructions your clinic gave you. (If no instructions, use regular soap.)  Don't shave or clip hair near your surgery site. We'll remove the hair if needed.  Don't smoke or vape the morning of surgery. No chewing tobacco for 6 hours before you arrive. A nicotine patch is okay. You may spit out nicotine gum when you arrive.  For some surgeries, the surgeon will tell you to fully quit smoking and nicotine.  We will make every effort to keep you  safe from infection. We will:  Clean our hands often with soap and water (or an alcohol-based hand rub).  Clean the skin at your surgery site with a special soap that kills germs.  Give you a special gown to keep you warm. (Cold raises the risk of infection.)  Wear hair covers, masks, gowns, and gloves during surgery.  Give antibiotic medicine, if prescribed. Not all surgeries need this medicine.  What to bring on the day of surgery  Photo ID and insurance card  Copy of your health care directive, if you have one  Glasses and hearing aids (bring cases)  You can't wear contacts during surgery  Inhaler and eye drops, if you use them (tell us about these when you arrive)  CPAP machine or breathing device, if you use them  A few personal items, if spending the night  If you have . . .  A pacemaker, ICD (cardiac defibrillator), or other implant: Bring the ID card.  An implanted stimulator: Bring the remote control.  A legal guardian: Bring a copy of the certified (court-stamped) guardianship papers.  Please remove any jewelry, including body piercings. Leave jewelry and other valuables at home.  If you're going home the day of surgery  You must have a support person drive you home. They should stay with you overnight, and they may need to help with your self-care.  If you don't have a support person, please tells us as soon as possible. We can help.  After surgery  If it's hard to control your pain or you need more pain medicine, please call your surgeon's office.  Questions?   If you have any questions for your care team, list them here:   ____________________________________________________________________________________________________________________________________________________________________________________________________________________________________________________________  For informational purposes only. Not to replace the advice of your health care provider. Copyright   2003, 2019 [facility]  Services. All rights reserved. Clinically reviewed by Nick Montano MD. [employer] [id number] - REV 02/25.